# Patient Record
Sex: FEMALE | Race: WHITE | NOT HISPANIC OR LATINO | Employment: FULL TIME | URBAN - METROPOLITAN AREA
[De-identification: names, ages, dates, MRNs, and addresses within clinical notes are randomized per-mention and may not be internally consistent; named-entity substitution may affect disease eponyms.]

---

## 2017-02-03 ENCOUNTER — HOSPITAL ENCOUNTER (OUTPATIENT)
Dept: LAB | Facility: MEDICAL CENTER | Age: 47
End: 2017-02-03
Attending: OBSTETRICS & GYNECOLOGY
Payer: COMMERCIAL

## 2017-02-03 LAB
HBV SURFACE AB SER RIA-ACNC: 11.74 MIU/ML (ref 0–10)
HCV AB S/CO SERPL IA: NEGATIVE
HIV 1+2 AB+HIV1 P24 AG SERPL QL IA: NON REACTIVE
TREPONEMA PALLIDUM IGG+IGM AB [PRESENCE] IN SERUM OR PLASMA BY IMMUNOASSAY: NON REACTIVE

## 2017-02-03 PROCEDURE — 86694 HERPES SIMPLEX NES ANTBDY: CPT

## 2017-02-03 PROCEDURE — 86696 HERPES SIMPLEX TYPE 2 TEST: CPT

## 2017-02-03 PROCEDURE — 86695 HERPES SIMPLEX TYPE 1 TEST: CPT

## 2017-02-03 PROCEDURE — 86803 HEPATITIS C AB TEST: CPT

## 2017-02-03 PROCEDURE — 36415 COLL VENOUS BLD VENIPUNCTURE: CPT

## 2017-02-03 PROCEDURE — 86780 TREPONEMA PALLIDUM: CPT

## 2017-02-03 PROCEDURE — 87389 HIV-1 AG W/HIV-1&-2 AB AG IA: CPT

## 2017-02-03 PROCEDURE — 86706 HEP B SURFACE ANTIBODY: CPT

## 2017-02-10 ENCOUNTER — TELEPHONE (OUTPATIENT)
Dept: MEDICAL GROUP | Facility: PHYSICIAN GROUP | Age: 47
End: 2017-02-10

## 2017-02-10 NOTE — TELEPHONE ENCOUNTER
1. Caller Name: Regina Schmidt                                           Call Back Number: [phone] (home) [phone] (work)        Patient approves a detailed voicemail message: N\A    Pt had labs done 02/03 and states she got part of the results posted to her MyChart but she did not get them all.  She states they are personal and is asking for results.  Thank you

## 2017-02-11 NOTE — TELEPHONE ENCOUNTER
Please call Regina and let her know that the labs are all negative.  The hepatitis B surface Ab is positive because she has been vaccinated.  I will try to release them all in epic to her

## 2017-03-23 LAB — TEST NAME 95000: NORMAL

## 2017-03-24 ENCOUNTER — PATIENT MESSAGE (OUTPATIENT)
Dept: MEDICAL GROUP | Facility: PHYSICIAN GROUP | Age: 47
End: 2017-03-24

## 2017-03-24 DIAGNOSIS — L73.9 FOLLICULITIS: ICD-10-CM

## 2017-03-24 RX ORDER — DOXYCYCLINE HYCLATE 100 MG
100 TABLET ORAL 2 TIMES DAILY
Qty: 20 TAB | Refills: 0 | Status: SHIPPED | OUTPATIENT
Start: 2017-03-24 | End: 2017-10-25

## 2017-04-10 LAB
HERPES SIMPLEX VIRUS 2 IGG AB [PRESENCE] IN SERUM BY IMMUNOBLOT: NEGATIVE
HERPES SIMPLEX VIRUS AG [PRESENCE] IN SERUM BY IMMUNOBLOT: POSITIVE
HSV1 IGG SER QL: POSITIVE

## 2017-05-10 DIAGNOSIS — B37.9 CANDIDA ALBICANS INFECTION: ICD-10-CM

## 2017-05-10 RX ORDER — FLUCONAZOLE 150 MG/1
150 TABLET ORAL ONCE
Qty: 2 TAB | Refills: 0 | Status: SHIPPED | OUTPATIENT
Start: 2017-05-10 | End: 2017-05-10

## 2017-05-15 DIAGNOSIS — B37.9 CANDIDA ALBICANS INFECTION: ICD-10-CM

## 2017-05-15 RX ORDER — FLUCONAZOLE 150 MG/1
150 TABLET ORAL ONCE
Qty: 1 TAB | Refills: 0 | OUTPATIENT
Start: 2017-05-15 | End: 2017-05-15

## 2017-05-15 NOTE — TELEPHONE ENCOUNTER
Please let pt know that Dr Lopez stated that if she is having persistent symptoms she needs to be seen by her. Pt to make appt to discuss further. Medication denied.

## 2017-05-15 NOTE — TELEPHONE ENCOUNTER
Pt received a prescription from urgent care for fluconazole and is requesting a refill  Was the patient seen in the last year in this department? Yes     Does patient have an active prescription for medications requested? No     Received Request Via: Pharmacy      Pt met protocol?: Yes pt last ov 9/2016

## 2017-07-19 ENCOUNTER — APPOINTMENT (OUTPATIENT)
Dept: RADIOLOGY | Facility: MEDICAL CENTER | Age: 47
End: 2017-07-19
Attending: OBSTETRICS & GYNECOLOGY
Payer: COMMERCIAL

## 2017-07-21 DIAGNOSIS — F32.9 REACTIVE DEPRESSION: ICD-10-CM

## 2017-07-21 DIAGNOSIS — M54.2 NECK PAIN, MUSCULOSKELETAL: ICD-10-CM

## 2017-07-21 RX ORDER — ALPRAZOLAM 0.25 MG/1
0.25 TABLET ORAL NIGHTLY PRN
Qty: 30 TAB | Refills: 1 | Status: SHIPPED
Start: 2017-07-21 | End: 2020-09-11 | Stop reason: SDUPTHER

## 2017-07-21 NOTE — TELEPHONE ENCOUNTER
Was the patient seen in the last year in this department? Yes     Does patient have an active prescription for medications requested? No     Received Request Via: Pharmacy [facility]

## 2017-07-21 NOTE — TELEPHONE ENCOUNTER
Was the patient seen in the last year in this department? Yes 09/14/16    Does patient have an active prescription for medications requested? No     Received Request Via: Pharmacy     Last filled 11/30/15

## 2017-07-24 RX ORDER — METAXALONE 800 MG/1
800 TABLET ORAL 3 TIMES DAILY
Qty: 90 TAB | Refills: 3 | Status: SHIPPED | OUTPATIENT
Start: 2017-07-24 | End: 2020-09-11 | Stop reason: SDUPTHER

## 2017-07-29 ENCOUNTER — HOSPITAL ENCOUNTER (OUTPATIENT)
Dept: RADIOLOGY | Facility: MEDICAL CENTER | Age: 47
End: 2017-07-29
Attending: OBSTETRICS & GYNECOLOGY
Payer: COMMERCIAL

## 2017-07-29 DIAGNOSIS — Z12.31 SCREENING MAMMOGRAM, ENCOUNTER FOR: ICD-10-CM

## 2017-07-29 PROCEDURE — 77063 BREAST TOMOSYNTHESIS BI: CPT

## 2017-08-10 ENCOUNTER — TELEPHONE (OUTPATIENT)
Dept: MEDICAL GROUP | Facility: PHYSICIAN GROUP | Age: 47
End: 2017-08-10

## 2017-08-25 ENCOUNTER — PATIENT MESSAGE (OUTPATIENT)
Dept: MEDICAL GROUP | Facility: PHYSICIAN GROUP | Age: 47
End: 2017-08-25

## 2017-08-25 DIAGNOSIS — M81.0 OSTEOPOROSIS, UNSPECIFIED OSTEOPOROSIS TYPE, UNSPECIFIED PATHOLOGICAL FRACTURE PRESENCE: ICD-10-CM

## 2017-08-28 ENCOUNTER — HOSPITAL ENCOUNTER (OUTPATIENT)
Dept: RADIOLOGY | Facility: MEDICAL CENTER | Age: 47
End: 2017-08-28
Attending: FAMILY MEDICINE
Payer: COMMERCIAL

## 2017-08-28 DIAGNOSIS — M81.0 OSTEOPOROSIS, UNSPECIFIED OSTEOPOROSIS TYPE, UNSPECIFIED PATHOLOGICAL FRACTURE PRESENCE: ICD-10-CM

## 2017-08-28 PROCEDURE — 77080 DXA BONE DENSITY AXIAL: CPT

## 2017-09-26 ENCOUNTER — HOSPITAL ENCOUNTER (OUTPATIENT)
Dept: LAB | Facility: MEDICAL CENTER | Age: 47
End: 2017-09-26
Payer: COMMERCIAL

## 2017-09-26 LAB
BDY FAT % MEASURED: 36.2 %
BP DIAS: 60 MMHG
BP SYS: 104 MMHG
CHOLEST SERPL-MCNC: 100 MG/DL (ref 100–199)
DIABETES HTDIA: NO
EVENT NAME HTEVT: NORMAL
FASTING HTFAS: YES
GLUCOSE SERPL-MCNC: 77 MG/DL (ref 65–99)
HDLC SERPL-MCNC: 55 MG/DL
HYPERTENSION HTHYP: NO
LDLC SERPL CALC-MCNC: 31 MG/DL
SCREENING LOC CITY HTCIT: NORMAL
SCREENING LOC STATE HTSTA: NORMAL
SCREENING LOCATION HTLOC: NORMAL
SMOKING HTSMO: NO
SUBSCRIBER ID HTSID: NORMAL
TRIGL SERPL-MCNC: 69 MG/DL (ref 0–149)

## 2017-09-26 PROCEDURE — 80061 LIPID PANEL: CPT

## 2017-09-26 PROCEDURE — 82947 ASSAY GLUCOSE BLOOD QUANT: CPT

## 2017-09-26 PROCEDURE — 36415 COLL VENOUS BLD VENIPUNCTURE: CPT

## 2017-09-26 PROCEDURE — S5190 WELLNESS ASSESSMENT BY NONPH: HCPCS

## 2017-10-06 ENCOUNTER — APPOINTMENT (OUTPATIENT)
Dept: MEDICAL GROUP | Facility: PHYSICIAN GROUP | Age: 47
End: 2017-10-06
Payer: COMMERCIAL

## 2017-10-23 NOTE — TELEPHONE ENCOUNTER
Was the patient seen in the last year in this department? No 09/14/16    Does patient have an active prescription for medications requested? No     Received Request Via: Pharmacy

## 2017-10-25 ENCOUNTER — OFFICE VISIT (OUTPATIENT)
Dept: MEDICAL GROUP | Facility: PHYSICIAN GROUP | Age: 47
End: 2017-10-25
Payer: COMMERCIAL

## 2017-10-25 VITALS
OXYGEN SATURATION: 100 % | HEART RATE: 70 BPM | DIASTOLIC BLOOD PRESSURE: 68 MMHG | BODY MASS INDEX: 31.01 KG/M2 | HEIGHT: 63 IN | TEMPERATURE: 98 F | SYSTOLIC BLOOD PRESSURE: 104 MMHG | RESPIRATION RATE: 12 BRPM | WEIGHT: 175 LBS

## 2017-10-25 DIAGNOSIS — Z98.84 STATUS POST BILIOPANCREATIC DIVERSION WITH DUODENAL SWITCH: ICD-10-CM

## 2017-10-25 DIAGNOSIS — E03.9 HYPOTHYROIDISM, UNSPECIFIED TYPE: ICD-10-CM

## 2017-10-25 DIAGNOSIS — Z23 NEED FOR INFLUENZA VACCINATION: ICD-10-CM

## 2017-10-25 DIAGNOSIS — G43.909 MIGRAINE WITHOUT STATUS MIGRAINOSUS, NOT INTRACTABLE, UNSPECIFIED MIGRAINE TYPE: ICD-10-CM

## 2017-10-25 PROCEDURE — 99214 OFFICE O/P EST MOD 30 MIN: CPT | Mod: 25 | Performed by: FAMILY MEDICINE

## 2017-10-25 PROCEDURE — 90471 IMMUNIZATION ADMIN: CPT | Performed by: FAMILY MEDICINE

## 2017-10-25 PROCEDURE — 90686 IIV4 VACC NO PRSV 0.5 ML IM: CPT | Performed by: FAMILY MEDICINE

## 2017-10-25 RX ORDER — LEVOTHYROXINE SODIUM 88 MCG
88 TABLET ORAL
Qty: 90 TAB | Refills: 3 | Status: SHIPPED | OUTPATIENT
Start: 2017-10-25 | End: 2018-08-31 | Stop reason: SDUPTHER

## 2017-10-25 RX ORDER — FLUTICASONE PROPIONATE 50 MCG
2 SPRAY, SUSPENSION (ML) NASAL DAILY
Qty: 3 BOTTLE | Refills: 3 | Status: SHIPPED | OUTPATIENT
Start: 2017-10-25 | End: 2018-08-31 | Stop reason: SDUPTHER

## 2017-10-25 RX ORDER — LEVOTHYROXINE SODIUM 88 MCG
88 TABLET ORAL
Qty: 90 TAB | Refills: 0 | OUTPATIENT
Start: 2017-10-25

## 2017-10-25 RX ORDER — SUMATRIPTAN 50 MG/1
50 TABLET, FILM COATED ORAL
Qty: 10 TAB | Refills: 11 | Status: SHIPPED | OUTPATIENT
Start: 2017-10-25 | End: 2018-08-31 | Stop reason: SDUPTHER

## 2017-10-25 RX ORDER — TOPIRAMATE 25 MG/1
TABLET ORAL
Qty: 180 TAB | Refills: 3 | Status: SHIPPED | OUTPATIENT
Start: 2017-10-25 | End: 2018-08-31 | Stop reason: SDUPTHER

## 2017-10-25 ASSESSMENT — PATIENT HEALTH QUESTIONNAIRE - PHQ9: CLINICAL INTERPRETATION OF PHQ2 SCORE: 0

## 2017-10-25 NOTE — PROGRESS NOTES
Chief Complaint   Patient presents with   • Medication Refill       HISTORY OF PRESENT ILLNESS: Patient is a 46 y.o. female established patient here today for the following concerns:    1. Need for influenza vaccination  Due for influenza vaccination    2. Migraine without status migrainosus, not intractable, unspecified migraine type  Needs refill on topamax and imitrex 50 mg.  Reports with the topamax has not had many migraines, and when she does they respond to imitrex well.     3. Hypothyroidism, unspecified type  Due for recheck on thyroid.  Continues on 88 mcg.  Symptoms well controlled due for recheck on labs.     4. Status post biliopancreatic diversion with duodenal switch  Due for routine mal-absorptive labs.  Continues on her vitamins.     5. BMI 30.0-30.9,adult  Counseled.       Past Medical, Social, and Family history reviewed and updated in EPIC    Allergies:Review of patient's allergies indicates no active allergies.    Current Outpatient Prescriptions   Medication Sig Dispense Refill   • sumatriptan (IMITREX) 50 MG Tab Take 1 Tab by mouth Once PRN for Migraine for up to 1 dose. 10 Tab 11   • topiramate (TOPAMAX) 25 MG Tab TAKE 1 TABLET BY MOUTH TWICE DAILY FOR     MIGRAINE HEADACHE -GENERIC FOR TOPAMAX 180 Tab 3   • SYNTHROID 88 MCG Tab Take 1 Tab by mouth Every morning on an empty stomach. TAKE 1 TABLET BY MOUTH DAILY 90 Tab 3   • fluticasone (FLONASE) 50 MCG/ACT nasal spray Spray 2 Sprays in nose every day. Each Nostril 3 Bottle 3   • metaxalone (SKELAXIN) 800 MG Tab Take 1 Tab by mouth 3 times a day. 90 Tab 3   • alprazolam (XANAX) 0.25 MG Tab Take 1 Tab by mouth at bedtime as needed for Sleep or Anxiety. Indications: Feeling Anxious, Trouble Sleeping 30 Tab 1   • levonorgestrel (MIRENA) 20 MCG/24HR IUD 1 Each by Intrauterine route Once.     • ondansetron (ZOFRAN ODT) 8 MG TABLET DISPERSIBLE Take 1 Tab by mouth every 8 hours as needed for Nausea/Vomiting. 15 Tab 0   • metoclopramide (REGLAN) 10 MG  "Tab Take 1 Tab by mouth 3 times a day before meals. 60 Tab 0   • Probiotic Product (PROBIOTIC PO) Take 1 Tab by mouth 3 times a day.     • Ferrous Fumarate 325 (106 FE) MG TABS Take  by mouth 3 times a day.     • Zinc 50 MG CAPS Take  by mouth.     • MULTI VIT/FL PO Take  by mouth.     • CALCIUM CITRATE 250 MG PO TABS Take 8 Each by mouth.     • VITAMIN D3 055770 UNIT/GM POWD 50,000 Each by Does not apply route 3 times a day.     • VITAMIN A 25,000 Int'l Units by Does not apply route.     • VITAMIN C 500 MG PO TABS Take 1,000 mg by mouth 2 Times a Day.     • GLUCOSAMINE CHONDRO COMPLEX PO Take  by mouth 2 Times a Day.     • NAPROSYN 250 MG PO TABS Take 250 mg by mouth Once.       No current facility-administered medications for this visit.          ROS:  Review of Systems   Constitutional: Negative for fever, chills, weight loss and malaise/fatigue.   HENT: Negative for ear pain, nosebleeds, congestion, sore throat and neck pain.    Eyes: Negative for blurred vision.   Respiratory: Negative for cough, sputum production, shortness of breath and wheezing.    Cardiovascular: Negative for chest pain, palpitations,  and leg swelling.   Gastrointestinal: Negative for heartburn, nausea, vomiting, diarrhea and abdominal pain.   Genitourinary: Negative for dysuria, urgency and frequency.   Musculoskeletal: Negative for myalgias, back pain and joint pain.   Skin: Negative for rash and itching.   Neurological: Negative for dizziness, tingling, tremors, sensory change, focal weakness and headaches.   Endo/Heme/Allergies: Does not bruise/bleed easily.   Psychiatric/Behavioral: Negative for depression, anxiety, suicidal ideas, insomnia and memory loss.      Exam:  Blood pressure 104/68, pulse 70, temperature 36.7 °C (98 °F), resp. rate 12, height 1.6 m (5' 2.99\"), weight 79.4 kg (175 lb), SpO2 100 %.    General:  Well nourished, well developed in NAD  Head is grossly normal.  Neck: Supple without JVD   Pulmonary:  Normal effort. "   Cardiovascular: Regular rate  Extremities: no clubbing, cyanosis, or edema.  Psych: affect appropriate      Please note that this dictation was created using voice recognition software. I have made every reasonable attempt to correct obvious errors, but I expect that there are errors of grammar and possibly content that I did not discover before finalizing the note.    Assessment/Plan:  1. Need for influenza vaccination    - INFLUENZA VACCINE QUAD INJ >3Y(PF)    2. Migraine without status migrainosus, not intractable, unspecified migraine type    - topiramate (TOPAMAX) 25 MG Tab; TAKE 1 TABLET BY MOUTH TWICE DAILY FOR     MIGRAINE HEADACHE -GENERIC FOR TOPAMAX  Dispense: 180 Tab; Refill: 3    3. Hypothyroidism, unspecified type    - TSH; Future  - FREE THYROXINE; Future  - TRIIDOTHYRONINE; Future    4. Status post biliopancreatic diversion with duodenal switch    - CBC WITH DIFFERENTIAL; Future  - COMP METABOLIC PANEL; Future  - VITAMIN B12; Future  - VITAMIN B6; Future  - IRON/TOTAL IRON BIND; Future  - FERRITIN; Future  - FOLATE; Future  - ZINC SERUM; Future  - COPPER, SERUM; Future  - CERULOPLASMIN; Future  - VITAMIN B3 NIACIN; Future  - PTH INTACT W/CA  - SELENIUM, WHOLE BLOOD; Future  - VITAMIN B1; Future  - VITAMIN A; Future  - TSH; Future  - FREE THYROXINE; Future  - TRIIDOTHYRONINE; Future  - VITAMIN D,25 HYDROXY; Future    5. BMI 30.0-30.9,adult    - Patient identified as having weight management issue.  Appropriate orders and counseling given.

## 2018-02-10 ENCOUNTER — OFFICE VISIT (OUTPATIENT)
Dept: URGENT CARE | Facility: PHYSICIAN GROUP | Age: 48
End: 2018-02-10
Payer: COMMERCIAL

## 2018-02-10 VITALS
DIASTOLIC BLOOD PRESSURE: 70 MMHG | BODY MASS INDEX: 30.48 KG/M2 | HEIGHT: 63 IN | OXYGEN SATURATION: 99 % | RESPIRATION RATE: 12 BRPM | HEART RATE: 83 BPM | SYSTOLIC BLOOD PRESSURE: 108 MMHG | TEMPERATURE: 98.4 F | WEIGHT: 172 LBS

## 2018-02-10 DIAGNOSIS — J01.90 ACUTE BACTERIAL SINUSITIS: ICD-10-CM

## 2018-02-10 DIAGNOSIS — B96.89 ACUTE BACTERIAL SINUSITIS: ICD-10-CM

## 2018-02-10 PROCEDURE — 99214 OFFICE O/P EST MOD 30 MIN: CPT | Performed by: NURSE PRACTITIONER

## 2018-02-10 RX ORDER — AMOXICILLIN AND CLAVULANATE POTASSIUM 875; 125 MG/1; MG/1
1 TABLET, FILM COATED ORAL 2 TIMES DAILY
Qty: 14 TAB | Refills: 0 | Status: SHIPPED
Start: 2018-02-10 | End: 2020-02-19

## 2018-02-10 RX ORDER — FLUCONAZOLE 150 MG/1
150 TABLET ORAL DAILY
Qty: 1 TAB | Refills: 1 | Status: SHIPPED | OUTPATIENT
Start: 2018-02-10 | End: 2019-11-25

## 2018-02-10 ASSESSMENT — ENCOUNTER SYMPTOMS
HEADACHES: 1
MYALGIAS: 1
BACK PAIN: 0
SINUS PAIN: 1
COUGH: 0
CHILLS: 0
NECK PAIN: 1
FEVER: 0
NAUSEA: 1

## 2018-02-10 NOTE — PROGRESS NOTES
Subjective:      Regina Schmidt is a 47 y.o. female who presents with Sinus Problem (ear pain, pressure since monday )            HPI new problem. 47 year old female with sinus congestion and facial pressure for 2 weeks. She states she started with body aches on Monday. Denies fever, chill, cough or sore throat. She has nausea but no vomiting. She has been taking OTC medications and flonase for her symptoms with no relief.  Patient has no known allergies.  Current Outpatient Prescriptions on File Prior to Visit   Medication Sig Dispense Refill   • metaxalone (SKELAXIN) 800 MG Tab Take 1 Tab by mouth 3 times a day. 90 Tab 3   • alprazolam (XANAX) 0.25 MG Tab Take 1 Tab by mouth at bedtime as needed for Sleep or Anxiety. Indications: Feeling Anxious, Trouble Sleeping 30 Tab 1   • levonorgestrel (MIRENA) 20 MCG/24HR IUD 1 Each by Intrauterine route Once.     • ondansetron (ZOFRAN ODT) 8 MG TABLET DISPERSIBLE Take 1 Tab by mouth every 8 hours as needed for Nausea/Vomiting. 15 Tab 0   • metoclopramide (REGLAN) 10 MG Tab Take 1 Tab by mouth 3 times a day before meals. 60 Tab 0   • Probiotic Product (PROBIOTIC PO) Take 1 Tab by mouth 3 times a day.     • Ferrous Fumarate 325 (106 FE) MG TABS Take  by mouth 3 times a day.     • Zinc 50 MG CAPS Take  by mouth.     • MULTI VIT/FL PO Take  by mouth.     • CALCIUM CITRATE 250 MG PO TABS Take 8 Each by mouth.     • VITAMIN D3 041242 UNIT/GM POWD 50,000 Each by Does not apply route 3 times a day.     • VITAMIN A 25,000 Int'l Units by Does not apply route.     • VITAMIN C 500 MG PO TABS Take 1,000 mg by mouth 2 Times a Day.     • GLUCOSAMINE CHONDRO COMPLEX PO Take  by mouth 2 Times a Day.     • NAPROSYN 250 MG PO TABS Take 250 mg by mouth Once.     • sumatriptan (IMITREX) 50 MG Tab Take 1 Tab by mouth Once PRN for Migraine for up to 1 dose. 10 Tab 11   • topiramate (TOPAMAX) 25 MG Tab TAKE 1 TABLET BY MOUTH TWICE DAILY FOR     MIGRAINE HEADACHE -GENERIC FOR TOPAMAX 180  "Tab 3   • SYNTHROID 88 MCG Tab Take 1 Tab by mouth Every morning on an empty stomach. TAKE 1 TABLET BY MOUTH DAILY 90 Tab 3   • fluticasone (FLONASE) 50 MCG/ACT nasal spray Spray 2 Sprays in nose every day. Each Nostril 3 Bottle 3     No current facility-administered medications on file prior to visit.      Social History     Social History   • Marital status: Single     Spouse name: N/A   • Number of children: N/A   • Years of education: N/A     Occupational History   • Not on file.     Social History Main Topics   • Smoking status: Never Smoker   • Smokeless tobacco: Never Used   • Alcohol use 0.0 oz/week   • Drug use: No   • Sexual activity: Yes     Partners: Male     Birth control/ protection: IUD, Surgical      Comment: Mirena  VAS     Other Topics Concern   • Not on file     Social History Narrative   • No narrative on file     family history includes Alcohol/Drug in her father and maternal grandmother; Arthritis in her mother; Cancer in her father, maternal aunt, maternal grandmother, and mother; Hypertension in her father and mother; Stroke in her paternal grandfather.      Review of Systems   Constitutional: Negative for chills and fever.   HENT: Positive for congestion and sinus pain.    Respiratory: Negative for cough.    Gastrointestinal: Positive for nausea.   Musculoskeletal: Positive for myalgias and neck pain. Negative for back pain and joint pain.   Neurological: Positive for headaches.          Objective:     /70   Pulse 83   Temp 36.9 °C (98.4 °F)   Resp 12   Ht 1.6 m (5' 3\")   Wt 78 kg (172 lb)   SpO2 99%   BMI 30.47 kg/m²      Physical Exam   Constitutional: She is oriented to person, place, and time. She appears well-developed and well-nourished. No distress.   HENT:   Head: Normocephalic and atraumatic.   Right Ear: External ear and ear canal normal. Tympanic membrane is not injected and not perforated. No middle ear effusion.   Left Ear: External ear and ear canal normal. " Tympanic membrane is not injected and not perforated.  No middle ear effusion.   Nose: Mucosal edema present.   Mouth/Throat: Posterior oropharyngeal erythema present. No oropharyngeal exudate.   Eyes: Conjunctivae are normal. Right eye exhibits no discharge. Left eye exhibits no discharge.   Neck: Normal range of motion. Neck supple.   Cardiovascular: Normal rate, regular rhythm and normal heart sounds.    No murmur heard.  Pulmonary/Chest: Effort normal and breath sounds normal. No respiratory distress.   Musculoskeletal: Normal range of motion.   Normal movement of all 4 extremities.   Lymphadenopathy:     She has no cervical adenopathy.        Right: No supraclavicular adenopathy present.        Left: No supraclavicular adenopathy present.   Neurological: She is alert and oriented to person, place, and time. Gait normal.   Skin: Skin is warm and dry.   Psychiatric: She has a normal mood and affect. Her behavior is normal. Thought content normal.   Nursing note and vitals reviewed.              Assessment/Plan:     1. Acute bacterial sinusitis  amoxicillin-clavulanate (AUGMENTIN) 875-125 MG Tab    fluconazole (DIFLUCAN) 150 MG tablet     Differential diagnosis, natural history, supportive care, and indications for immediate follow-up discussed at length.

## 2018-03-25 ENCOUNTER — PATIENT MESSAGE (OUTPATIENT)
Dept: MEDICAL GROUP | Facility: PHYSICIAN GROUP | Age: 48
End: 2018-03-25

## 2018-03-25 DIAGNOSIS — R39.9 UTI SYMPTOMS: ICD-10-CM

## 2018-03-26 ENCOUNTER — HOSPITAL ENCOUNTER (OUTPATIENT)
Facility: MEDICAL CENTER | Age: 48
End: 2018-03-26
Attending: INTERNAL MEDICINE
Payer: COMMERCIAL

## 2018-03-26 ENCOUNTER — PATIENT MESSAGE (OUTPATIENT)
Dept: MEDICAL GROUP | Facility: PHYSICIAN GROUP | Age: 48
End: 2018-03-26

## 2018-03-26 DIAGNOSIS — R39.9 UTI SYMPTOMS: ICD-10-CM

## 2018-03-26 LAB
APPEARANCE UR: CLEAR
BACTERIA #/AREA URNS HPF: NEGATIVE /HPF
BILIRUB UR QL STRIP.AUTO: NEGATIVE
COLOR UR: ABNORMAL
CULTURE IF INDICATED INDCX: YES UA CULTURE
EPI CELLS #/AREA URNS HPF: NORMAL /HPF
GLUCOSE UR STRIP.AUTO-MCNC: NEGATIVE MG/DL
HYALINE CASTS #/AREA URNS LPF: NORMAL /LPF
KETONES UR STRIP.AUTO-MCNC: NEGATIVE MG/DL
LEUKOCYTE ESTERASE UR QL STRIP.AUTO: ABNORMAL
MICRO URNS: ABNORMAL
NITRITE UR QL STRIP.AUTO: POSITIVE
PH UR STRIP.AUTO: 6.5 [PH]
PROT UR QL STRIP: NEGATIVE MG/DL
RBC # URNS HPF: NORMAL /HPF
RBC UR QL AUTO: NEGATIVE
SP GR UR STRIP.AUTO: 1.01
UROBILINOGEN UR STRIP.AUTO-MCNC: 1 MG/DL
WBC #/AREA URNS HPF: NORMAL /HPF

## 2018-03-26 PROCEDURE — 87086 URINE CULTURE/COLONY COUNT: CPT

## 2018-03-26 PROCEDURE — 81001 URINALYSIS AUTO W/SCOPE: CPT

## 2018-03-26 RX ORDER — NITROFURANTOIN 25; 75 MG/1; MG/1
100 CAPSULE ORAL 2 TIMES DAILY
Qty: 14 CAP | Refills: 0 | Status: SHIPPED | OUTPATIENT
Start: 2018-03-26 | End: 2020-02-19

## 2018-03-26 NOTE — TELEPHONE ENCOUNTER
I have prescribed her a course of antibiotics but she needs to come to the lab for a urine sample before starting the antibiotics.

## 2018-03-26 NOTE — TELEPHONE ENCOUNTER
----- Message from C[facility] Gutierrez Med [facility]'t sent at 3/26/2018  1:49 PM PDT -----  Did you need the urinalysis culture if indicated to be a clinic collect or a lab collect?  Please advise.  Thank you.

## 2018-03-28 LAB
BACTERIA UR CULT: NORMAL
SIGNIFICANT IND 70042: NORMAL
SITE SITE: NORMAL
SOURCE SOURCE: NORMAL

## 2018-04-04 ENCOUNTER — OFFICE VISIT (OUTPATIENT)
Dept: URGENT CARE | Facility: CLINIC | Age: 48
End: 2018-04-04
Payer: COMMERCIAL

## 2018-04-04 VITALS
DIASTOLIC BLOOD PRESSURE: 78 MMHG | RESPIRATION RATE: 16 BRPM | SYSTOLIC BLOOD PRESSURE: 114 MMHG | BODY MASS INDEX: 32.5 KG/M2 | HEART RATE: 84 BPM | WEIGHT: 183.42 LBS | HEIGHT: 63 IN | TEMPERATURE: 97.6 F | OXYGEN SATURATION: 98 %

## 2018-04-04 DIAGNOSIS — R53.83 FATIGUE, UNSPECIFIED TYPE: ICD-10-CM

## 2018-04-04 DIAGNOSIS — J06.9 UPPER RESPIRATORY TRACT INFECTION, UNSPECIFIED TYPE: ICD-10-CM

## 2018-04-04 DIAGNOSIS — R52 BODY ACHES: ICD-10-CM

## 2018-04-04 DIAGNOSIS — Z87.440 HISTORY OF UTI: ICD-10-CM

## 2018-04-04 LAB
APPEARANCE UR: NORMAL
BILIRUB UR STRIP-MCNC: NORMAL MG/DL
COLOR UR AUTO: YELLOW
FLUAV+FLUBV AG SPEC QL IA: NEGATIVE
GLUCOSE UR STRIP.AUTO-MCNC: NORMAL MG/DL
HETEROPH AB SER QL LA: NEGATIVE
INT CON NEG: NEGATIVE
INT CON NEG: NORMAL
INT CON POS: NORMAL
INT CON POS: POSITIVE
KETONES UR STRIP.AUTO-MCNC: NORMAL MG/DL
LEUKOCYTE ESTERASE UR QL STRIP.AUTO: NORMAL
NITRITE UR QL STRIP.AUTO: NORMAL
PH UR STRIP.AUTO: 6 [PH] (ref 5–8)
PROT UR QL STRIP: NORMAL MG/DL
RBC UR QL AUTO: NORMAL
SP GR UR STRIP.AUTO: 1
UROBILINOGEN UR STRIP-MCNC: NORMAL MG/DL

## 2018-04-04 PROCEDURE — 86308 HETEROPHILE ANTIBODY SCREEN: CPT | Performed by: NURSE PRACTITIONER

## 2018-04-04 PROCEDURE — 99214 OFFICE O/P EST MOD 30 MIN: CPT | Performed by: NURSE PRACTITIONER

## 2018-04-04 PROCEDURE — 81002 URINALYSIS NONAUTO W/O SCOPE: CPT | Performed by: NURSE PRACTITIONER

## 2018-04-04 PROCEDURE — 87804 INFLUENZA ASSAY W/OPTIC: CPT | Performed by: NURSE PRACTITIONER

## 2018-04-04 RX ORDER — AZITHROMYCIN 250 MG/1
TABLET, FILM COATED ORAL
Qty: 6 TAB | Refills: 0 | Status: SHIPPED | OUTPATIENT
Start: 2018-04-04 | End: 2020-02-19

## 2018-04-04 ASSESSMENT — ENCOUNTER SYMPTOMS
MYALGIAS: 1
GASTROINTESTINAL NEGATIVE: 1
CARDIOVASCULAR NEGATIVE: 1
EYES NEGATIVE: 1
HEADACHES: 0
DIZZINESS: 0
SORE THROAT: 1
CHILLS: 1
WHEEZING: 0
COUGH: 1
FEVER: 1
SHORTNESS OF BREATH: 0

## 2018-04-04 NOTE — PATIENT INSTRUCTIONS
"Influenza, Adult  Influenza (“the flu\") is an infection in the lungs, nose, and throat (respiratory tract). It is caused by a virus. The flu causes many common cold symptoms, as well as a high fever and body aches. It can make you feel very sick.  The flu spreads easily from person to person (is contagious). Getting a flu shot (influenza vaccination) every year is the best way to prevent the flu.  Follow these instructions at home:  · Take over-the-counter and prescription medicines only as told by your doctor.  · Use a cool mist humidifier to add moisture (humidity) to the air in your home. This can make it easier to breathe.  · Rest as needed.  · Drink enough fluid to keep your pee (urine) clear or pale yellow.  · Cover your mouth and nose when you cough or sneeze.  · Wash your hands with soap and water often, especially after you cough or sneeze. If you cannot use soap and water, use hand .  · Stay home from work or school as told by your doctor. Unless you are visiting your doctor, try to avoid leaving home until your fever has been gone for 24 hours without the use of medicine.  · Keep all follow-up visits as told by your doctor. This is important.  How is this prevented?  · Getting a yearly (annual) flu shot is the best way to avoid getting the flu. You may get the flu shot in late summer, fall, or winter. Ask your doctor when you should get your flu shot.  · Wash your hands often or use hand  often.  · Avoid contact with people who are sick during cold and flu season.  · Eat healthy foods.  · Drink plenty of fluids.  · Get enough sleep.  · Exercise regularly.  Contact a doctor if:  · You get new symptoms.  · You have:  ¨ Chest pain.  ¨ Watery poop (diarrhea).  ¨ A fever.  · Your cough gets worse.  · You start to have more mucus.  · You feel sick to your stomach (nauseous).  · You throw up (vomit).  Get help right away if:  · You start to be short of breath or have trouble breathing.  · Your " skin or nails turn a bluish color.  · You have very bad pain or stiffness in your neck.  · You get a sudden headache.  · You get sudden pain in your face or ear.  · You cannot stop throwing up.  This information is not intended to replace advice given to you by your health care provider. Make sure you discuss any questions you have with your health care provider.  Document Released: 09/26/2009 Document Revised: 05/25/2017 Document Reviewed: 10/11/2016  Elsevier Interactive Patient Education © 2017 [employer]

## 2018-04-04 NOTE — PROGRESS NOTES
Subjective:      Regina Schmidt is a 47 y.o. female who presents with Cough (x 5 days, nonproductive cough, chest congestion, fever and bodyaches)            HPI   Patient presents complaining of possible flu symptoms. States symptoms started over the weekend where she felt warm and flushed and tired. Saturday patient was very tired and fatigue was out of the normal.  States she also had a mild cough.  Sunday she developed body aches. States on Monday her fever was 103.9.   Patient has been taking Motrin and Tylenol to bring down fevers.  Patient also complains of a scratchy throat no pain   right ear pressure but no pain   negative sinus pressure or congestion and cough remains mild.  Positive sick contacts    Patient is denying nausea vomiting diarrhea or abdominal pain  Patient does state that last week she had a urinary tract infection and finished her last dose of Macrobid on Monday. Patient is denying any UTI symptoms as she states they have all resolved    Medications: Motrin and Tylenol Sudafed Mucinex and essential oils    PMH:  has a past medical history of Acute pansinusitis (11/25/2015); Fatigue (10/13/2014); Impacted cerumen of both ears (10/13/2014); Lymphedema (1/28/2011); Neck pain, musculoskeletal (11/25/2015); Reactive depression (11/12/2014); S/P gastric bypass (2/4/2010); and S/P tonsillectomy and adenoidectomy. She also has no past medical history of Allergy or ASTHMA.  MEDS:   Current Outpatient Prescriptions:   •  azithromycin (ZITHROMAX) 250 MG Tab, Take 2 tabs day 1. Take 1 tab day 2-5 #6, Disp: 6 Tab, Rfl: 0  •  nitrofurantoin monohydr macro (MACROBID) 100 MG Cap, Take 1 Cap by mouth 2 times a day., Disp: 14 Cap, Rfl: 0  •  sumatriptan (IMITREX) 50 MG Tab, Take 1 Tab by mouth Once PRN for Migraine for up to 1 dose., Disp: 10 Tab, Rfl: 11  •  topiramate (TOPAMAX) 25 MG Tab, TAKE 1 TABLET BY MOUTH TWICE DAILY FOR     MIGRAINE HEADACHE -GENERIC FOR TOPAMAX, Disp: 180 Tab, Rfl: 3  •   SYNTHROID 88 MCG Tab, Take 1 Tab by mouth Every morning on an empty stomach. TAKE 1 TABLET BY MOUTH DAILY, Disp: 90 Tab, Rfl: 3  •  fluticasone (FLONASE) 50 MCG/ACT nasal spray, Spray 2 Sprays in nose every day. Each Nostril, Disp: 3 Bottle, Rfl: 3  •  metaxalone (SKELAXIN) 800 MG Tab, Take 1 Tab by mouth 3 times a day., Disp: 90 Tab, Rfl: 3  •  levonorgestrel (MIRENA) 20 MCG/24HR IUD, 1 Each by Intrauterine route Once., Disp: , Rfl:   •  ondansetron (ZOFRAN ODT) 8 MG TABLET DISPERSIBLE, Take 1 Tab by mouth every 8 hours as needed for Nausea/Vomiting., Disp: 15 Tab, Rfl: 0  •  Probiotic Product (PROBIOTIC PO), Take 1 Tab by mouth 3 times a day., Disp: , Rfl:   •  Ferrous Fumarate 325 (106 FE) MG TABS, Take  by mouth 3 times a day., Disp: , Rfl:   •  Zinc 50 MG CAPS, Take  by mouth., Disp: , Rfl:   •  MULTI VIT/FL PO, Take  by mouth., Disp: , Rfl:   •  VITAMIN D3 219179 UNIT/GM POWD, 50,000 Each by Does not apply route 3 times a day., Disp: , Rfl:   •  VITAMIN A, 25,000 Int'l Units by Does not apply route., Disp: , Rfl:   •  VITAMIN C 500 MG PO TABS, Take 1,000 mg by mouth 2 Times a Day., Disp: , Rfl:   •  GLUCOSAMINE CHONDRO COMPLEX PO, Take  by mouth 2 Times a Day., Disp: , Rfl:   •  amoxicillin-clavulanate (AUGMENTIN) 875-125 MG Tab, Take 1 Tab by mouth 2 times a day., Disp: 14 Tab, Rfl: 0  •  fluconazole (DIFLUCAN) 150 MG tablet, Take 1 Tab by mouth every day., Disp: 1 Tab, Rfl: 1  •  alprazolam (XANAX) 0.25 MG Tab, Take 1 Tab by mouth at bedtime as needed for Sleep or Anxiety. Indications: Feeling Anxious, Trouble Sleeping, Disp: 30 Tab, Rfl: 1  •  metoclopramide (REGLAN) 10 MG Tab, Take 1 Tab by mouth 3 times a day before meals., Disp: 60 Tab, Rfl: 0  •  CALCIUM CITRATE 250 MG PO TABS, Take 8 Each by mouth., Disp: , Rfl:   •  NAPROSYN 250 MG PO TABS, Take 250 mg by mouth Once., Disp: , Rfl:   ALLERGIES: No Known Allergies  SURGHX:   Past Surgical History:   Procedure Laterality Date   • ABDOMINOPLASTY       "following weight loss   • CHOLECYSTECTOMY     • GASTRIC RESECTION      duodenal switch   • PRIMARY C SECTION     • TONSILLECTOMY AND ADENOIDECTOMY     • US-CYST ASPIRATION-BREAST INITIAL       SOCHX:  reports that she has never smoked. She has never used smokeless tobacco. She reports that she drinks alcohol. She reports that she does not use drugs.  FH: family history includes Alcohol/Drug in her father and maternal grandmother; Arthritis in her mother; Cancer in her father, maternal aunt, maternal grandmother, and mother; Hypertension in her father and mother; Stroke in her paternal grandfather.    Review of Systems   Constitutional: Positive for chills, fever and malaise/fatigue.   HENT: Positive for congestion and sore throat.    Eyes: Negative.    Respiratory: Positive for cough. Negative for shortness of breath and wheezing.    Cardiovascular: Negative.    Gastrointestinal: Negative.    Genitourinary: Negative.    Musculoskeletal: Positive for myalgias.   Skin: Positive for rash.   Neurological: Negative for dizziness and headaches.          Objective:     /78   Pulse 84   Temp 36.4 °C (97.6 °F)   Resp 16   Ht 1.6 m (5' 3\")   Wt 83.2 kg (183 lb 6.8 oz)   SpO2 98%   BMI 32.49 kg/m²      Physical Exam   Constitutional: She is oriented to person, place, and time. She appears well-developed and well-nourished.   HENT:   Head: Normocephalic and atraumatic.   Right Ear: External ear normal.   Left Ear: External ear normal.   Nose: Rhinorrhea present.   Eyes: Conjunctivae are normal. Pupils are equal, round, and reactive to light.   Neck: Normal range of motion. Neck supple.   Cardiovascular: Normal rate, regular rhythm and normal heart sounds.    Pulmonary/Chest: Effort normal and breath sounds normal. No respiratory distress.   Abdominal: Soft. Bowel sounds are normal.   Musculoskeletal: Normal range of motion.   Lymphadenopathy:     She has cervical adenopathy.   Neurological: She is alert and " oriented to person, place, and time.   Skin: Skin is warm and dry. Capillary refill takes less than 2 seconds.   Psychiatric: Judgment normal. Her mood appears anxious.               Assessment/Plan:       1. Upper respiratory tract infection, unspecified type  azithromycin (ZITHROMAX) 250 MG Tab   2. History of UTI  POCT Urinalysis   3. Fatigue, unspecified type  POCT Mononucleosis (mono)   4. Body aches  POCT Influenza A/B     Influenza negative  Mono negative  Urine dip--> no nitrites as patient had previously.  Long discussion with patient regarding possible flu and fatigue as this is patient's main concern.  Patient declined CBC  Declined Tamiflu  Advised patient to stay home,rest and  stay hydrated.  Continue Tylenol and Motrin when necessary body aches or fever  Over-the-counter cough medicine as needed  Is fatigue continues patient to follow up with her primary  Contingent antibiotic symptoms do not improve in a few days start Z-Ananda  Where symptoms please follow up PRN sooner

## 2018-04-04 NOTE — LETTER
[facility]  [address]  [place] [address]     April 4, 2018    Patient: Regina Schmidt   YOB: 1970   Date of Visit: 4/4/2018       To Whom It May Concern:    Regina Schmidt was seen and treated in our department on 4/4/2018. She is excused from work 4/4/2018-4/6/2018. May return 4/7/2018.    Sincerely,     SEBASTIAN Sullivan.

## 2018-04-24 DIAGNOSIS — R11.0 NAUSEA: ICD-10-CM

## 2018-04-24 NOTE — TELEPHONE ENCOUNTER
Was the patient seen in the last year in this department? Yes 10/25/2017    Does patient have an active prescription for medications requested? No     Received Request Via: Patient

## 2018-04-25 NOTE — TELEPHONE ENCOUNTER
*/REQUESTED DOSE OF 4mg, CURRENT DOSE 8mg*  Was the patient seen in the last year in this department? Yes     Does patient have an active prescription for medications requested? No     Received Request Via: Pharmacy      Pt met protocol?: YES    LAST OV 10/25/2017

## 2018-04-26 RX ORDER — ONDANSETRON 4 MG/1
TABLET, FILM COATED ORAL
Qty: 12 TAB | Refills: 0 | Status: SHIPPED | OUTPATIENT
Start: 2018-04-26 | End: 2018-08-31 | Stop reason: SDUPTHER

## 2018-07-25 ENCOUNTER — HOSPITAL ENCOUNTER (OUTPATIENT)
Dept: LAB | Facility: MEDICAL CENTER | Age: 48
End: 2018-07-25
Payer: COMMERCIAL

## 2018-07-25 ENCOUNTER — HOSPITAL ENCOUNTER (OUTPATIENT)
Dept: LAB | Facility: MEDICAL CENTER | Age: 48
End: 2018-07-25
Attending: FAMILY MEDICINE
Payer: COMMERCIAL

## 2018-07-25 DIAGNOSIS — Z98.84 STATUS POST BILIOPANCREATIC DIVERSION WITH DUODENAL SWITCH: ICD-10-CM

## 2018-07-25 DIAGNOSIS — E03.9 HYPOTHYROIDISM, UNSPECIFIED TYPE: ICD-10-CM

## 2018-07-25 LAB
25(OH)D3 SERPL-MCNC: 78 NG/ML (ref 30–100)
ALBUMIN SERPL BCP-MCNC: 3.9 G/DL (ref 3.2–4.9)
ALBUMIN/GLOB SERPL: 1.7 G/DL
ALP SERPL-CCNC: 50 U/L (ref 30–99)
ALT SERPL-CCNC: 26 U/L (ref 2–50)
ANION GAP SERPL CALC-SCNC: 6 MMOL/L (ref 0–11.9)
AST SERPL-CCNC: 22 U/L (ref 12–45)
BASOPHILS # BLD AUTO: 0.5 % (ref 0–1.8)
BASOPHILS # BLD: 0.03 K/UL (ref 0–0.12)
BDY FAT % MEASURED: 37.3 %
BILIRUB SERPL-MCNC: 1.2 MG/DL (ref 0.1–1.5)
BP DIAS: 68 MMHG
BP SYS: 93 MMHG
BUN SERPL-MCNC: 18 MG/DL (ref 8–22)
CALCIUM SERPL-MCNC: 8.9 MG/DL (ref 8.5–10.5)
CHLORIDE SERPL-SCNC: 110 MMOL/L (ref 96–112)
CHOLEST SERPL-MCNC: 104 MG/DL (ref 100–199)
CO2 SERPL-SCNC: 25 MMOL/L (ref 20–33)
CREAT SERPL-MCNC: 0.8 MG/DL (ref 0.5–1.4)
DIABETES HTDIA: NO
EOSINOPHIL # BLD AUTO: 0.12 K/UL (ref 0–0.51)
EOSINOPHIL NFR BLD: 2 % (ref 0–6.9)
ERYTHROCYTE [DISTWIDTH] IN BLOOD BY AUTOMATED COUNT: 49.6 FL (ref 35.9–50)
EVENT NAME HTEVT: NORMAL
FASTING HTFAS: YES
FERRITIN SERPL-MCNC: 82.3 NG/ML (ref 10–291)
FOLATE SERPL-MCNC: 16.6 NG/ML
GLOBULIN SER CALC-MCNC: 2.3 G/DL (ref 1.9–3.5)
GLUCOSE SERPL-MCNC: 83 MG/DL (ref 65–99)
GLUCOSE SERPL-MCNC: 83 MG/DL (ref 65–99)
HCT VFR BLD AUTO: 43 % (ref 37–47)
HDLC SERPL-MCNC: 51 MG/DL
HGB BLD-MCNC: 14.1 G/DL (ref 12–16)
HYPERTENSION HTHYP: NO
IMM GRANULOCYTES # BLD AUTO: 0.02 K/UL (ref 0–0.11)
IMM GRANULOCYTES NFR BLD AUTO: 0.3 % (ref 0–0.9)
IRON SATN MFR SERPL: 24 % (ref 15–55)
IRON SERPL-MCNC: 71 UG/DL (ref 40–170)
LDLC SERPL CALC-MCNC: 37 MG/DL
LYMPHOCYTES # BLD AUTO: 1.13 K/UL (ref 1–4.8)
LYMPHOCYTES NFR BLD: 18.5 % (ref 22–41)
MCH RBC QN AUTO: 33.2 PG (ref 27–33)
MCHC RBC AUTO-ENTMCNC: 32.8 G/DL (ref 33.6–35)
MCV RBC AUTO: 101.2 FL (ref 81.4–97.8)
MONOCYTES # BLD AUTO: 0.47 K/UL (ref 0–0.85)
MONOCYTES NFR BLD AUTO: 7.7 % (ref 0–13.4)
NEUTROPHILS # BLD AUTO: 4.35 K/UL (ref 2–7.15)
NEUTROPHILS NFR BLD: 71 % (ref 44–72)
NRBC # BLD AUTO: 0 K/UL
NRBC BLD-RTO: 0 /100 WBC
PLATELET # BLD AUTO: 147 K/UL (ref 164–446)
PMV BLD AUTO: 11.1 FL (ref 9–12.9)
POTASSIUM SERPL-SCNC: 4 MMOL/L (ref 3.6–5.5)
PROT SERPL-MCNC: 6.2 G/DL (ref 6–8.2)
PTH-INTACT SERPL-MCNC: 65.4 PG/ML (ref 14–72)
RBC # BLD AUTO: 4.25 M/UL (ref 4.2–5.4)
SCREENING LOC CITY HTCIT: NORMAL
SCREENING LOC STATE HTSTA: NORMAL
SCREENING LOCATION HTLOC: NORMAL
SMOKING HTSMO: NO
SODIUM SERPL-SCNC: 141 MMOL/L (ref 135–145)
SUBSCRIBER ID HTSID: NORMAL
T3 SERPL-MCNC: 81.6 NG/DL (ref 60–181)
T4 FREE SERPL-MCNC: 0.82 NG/DL (ref 0.53–1.43)
TIBC SERPL-MCNC: 300 UG/DL (ref 250–450)
TRIGL SERPL-MCNC: 82 MG/DL (ref 0–149)
TSH SERPL DL<=0.005 MIU/L-ACNC: 4.01 UIU/ML (ref 0.38–5.33)
VIT B12 SERPL-MCNC: 1100 PG/ML (ref 211–911)
WBC # BLD AUTO: 6.1 K/UL (ref 4.8–10.8)

## 2018-07-25 PROCEDURE — 84443 ASSAY THYROID STIM HORMONE: CPT

## 2018-07-25 PROCEDURE — 82947 ASSAY GLUCOSE BLOOD QUANT: CPT

## 2018-07-25 PROCEDURE — 36415 COLL VENOUS BLD VENIPUNCTURE: CPT

## 2018-07-25 PROCEDURE — 84630 ASSAY OF ZINC: CPT

## 2018-07-25 PROCEDURE — 84590 ASSAY OF VITAMIN A: CPT

## 2018-07-25 PROCEDURE — 82306 VITAMIN D 25 HYDROXY: CPT

## 2018-07-25 PROCEDURE — 84207 ASSAY OF VITAMIN B-6: CPT

## 2018-07-25 PROCEDURE — 84425 ASSAY OF VITAMIN B-1: CPT

## 2018-07-25 PROCEDURE — 83970 ASSAY OF PARATHORMONE: CPT

## 2018-07-25 PROCEDURE — S5190 WELLNESS ASSESSMENT BY NONPH: HCPCS

## 2018-07-25 PROCEDURE — 82390 ASSAY OF CERULOPLASMIN: CPT

## 2018-07-25 PROCEDURE — 83540 ASSAY OF IRON: CPT

## 2018-07-25 PROCEDURE — 82728 ASSAY OF FERRITIN: CPT

## 2018-07-25 PROCEDURE — 80061 LIPID PANEL: CPT

## 2018-07-25 PROCEDURE — 84255 ASSAY OF SELENIUM: CPT

## 2018-07-25 PROCEDURE — 84591 ASSAY OF NOS VITAMIN: CPT

## 2018-07-25 PROCEDURE — 82525 ASSAY OF COPPER: CPT

## 2018-07-25 PROCEDURE — 85025 COMPLETE CBC W/AUTO DIFF WBC: CPT

## 2018-07-25 PROCEDURE — 84480 ASSAY TRIIODOTHYRONINE (T3): CPT

## 2018-07-25 PROCEDURE — 84439 ASSAY OF FREE THYROXINE: CPT

## 2018-07-25 PROCEDURE — 80053 COMPREHEN METABOLIC PANEL: CPT

## 2018-07-25 PROCEDURE — 82607 VITAMIN B-12: CPT

## 2018-07-25 PROCEDURE — 83550 IRON BINDING TEST: CPT

## 2018-07-25 PROCEDURE — 82746 ASSAY OF FOLIC ACID SERUM: CPT

## 2018-07-26 LAB — CERULOPLASMIN SERPL-MCNC: 19 MG/DL (ref 17–54)

## 2018-07-27 LAB — COPPER SERPL-MCNC: 86 UG/DL (ref 80–155)

## 2018-07-28 LAB
SELENIUM SERPL-MCNC: 102 UG/L (ref 23–190)
ZINC BLD-MCNC: 540.3 UG/DL (ref 440–860)

## 2018-07-29 LAB
ANNOTATION COMMENT IMP: NORMAL
RETINYL PALMITATE SERPL-MCNC: 0.02 MG/L (ref 0–0.1)
VIT A SERPL-MCNC: 0.55 MG/L (ref 0.3–1.2)
VIT B6 SERPL-MCNC: 68.8 NMOL/L (ref 20–125)

## 2018-07-30 LAB — VIT B1 BLD-MCNC: 166 NMOL/L (ref 70–180)

## 2018-07-31 ENCOUNTER — APPOINTMENT (OUTPATIENT)
Dept: RADIOLOGY | Facility: MEDICAL CENTER | Age: 48
End: 2018-07-31
Attending: OBSTETRICS & GYNECOLOGY
Payer: COMMERCIAL

## 2018-07-31 ENCOUNTER — PATIENT MESSAGE (OUTPATIENT)
Dept: MEDICAL GROUP | Facility: PHYSICIAN GROUP | Age: 48
End: 2018-07-31

## 2018-08-03 LAB — NIACIN SERPL-MCNC: 4.75 UG/ML (ref 0.5–8.45)

## 2018-08-08 ENCOUNTER — HOSPITAL ENCOUNTER (OUTPATIENT)
Dept: RADIOLOGY | Facility: MEDICAL CENTER | Age: 48
End: 2018-08-08
Attending: OBSTETRICS & GYNECOLOGY
Payer: COMMERCIAL

## 2018-08-08 DIAGNOSIS — Z12.31 VISIT FOR SCREENING MAMMOGRAM: ICD-10-CM

## 2018-08-08 PROCEDURE — 77067 SCR MAMMO BI INCL CAD: CPT

## 2018-08-31 ENCOUNTER — OFFICE VISIT (OUTPATIENT)
Dept: MEDICAL GROUP | Facility: PHYSICIAN GROUP | Age: 48
End: 2018-08-31
Payer: COMMERCIAL

## 2018-08-31 VITALS
BODY MASS INDEX: 32.78 KG/M2 | SYSTOLIC BLOOD PRESSURE: 106 MMHG | WEIGHT: 185 LBS | RESPIRATION RATE: 14 BRPM | OXYGEN SATURATION: 98 % | HEIGHT: 63 IN | TEMPERATURE: 98 F | DIASTOLIC BLOOD PRESSURE: 62 MMHG | HEART RATE: 66 BPM

## 2018-08-31 DIAGNOSIS — M54.2 CERVICALGIA: ICD-10-CM

## 2018-08-31 DIAGNOSIS — M25.552 LEFT HIP PAIN: ICD-10-CM

## 2018-08-31 DIAGNOSIS — G43.909 MIGRAINE WITHOUT STATUS MIGRAINOSUS, NOT INTRACTABLE, UNSPECIFIED MIGRAINE TYPE: ICD-10-CM

## 2018-08-31 DIAGNOSIS — R11.0 NAUSEA: ICD-10-CM

## 2018-08-31 DIAGNOSIS — M72.2 PLANTAR FASCIITIS OF LEFT FOOT: ICD-10-CM

## 2018-08-31 PROCEDURE — 99214 OFFICE O/P EST MOD 30 MIN: CPT | Performed by: FAMILY MEDICINE

## 2018-08-31 RX ORDER — SUMATRIPTAN 50 MG/1
50 TABLET, FILM COATED ORAL
Qty: 10 TAB | Refills: 11 | Status: SHIPPED | OUTPATIENT
Start: 2018-08-31 | End: 2019-08-28

## 2018-08-31 RX ORDER — FLUTICASONE PROPIONATE 50 MCG
2 SPRAY, SUSPENSION (ML) NASAL DAILY
Qty: 3 BOTTLE | Refills: 3 | Status: SHIPPED | OUTPATIENT
Start: 2018-08-31 | End: 2022-12-05 | Stop reason: SDUPTHER

## 2018-08-31 RX ORDER — LEVOTHYROXINE SODIUM 88 MCG
88 TABLET ORAL
Qty: 90 TAB | Refills: 3 | Status: SHIPPED | OUTPATIENT
Start: 2018-08-31 | End: 2019-08-28 | Stop reason: SDUPTHER

## 2018-08-31 RX ORDER — ONDANSETRON 4 MG/1
TABLET, FILM COATED ORAL
Qty: 12 TAB | Refills: 11 | Status: SHIPPED | OUTPATIENT
Start: 2018-08-31 | End: 2019-08-28 | Stop reason: SDUPTHER

## 2018-08-31 RX ORDER — TOPIRAMATE 25 MG/1
TABLET ORAL
Qty: 180 TAB | Refills: 3 | Status: SHIPPED | OUTPATIENT
Start: 2018-08-31 | End: 2019-08-28 | Stop reason: SDUPTHER

## 2018-08-31 NOTE — PROGRESS NOTES
Chief Complaint   Patient presents with   • Hypothyroidism     fv meds   • Migraine     fv meds       HISTORY OF PRESENT ILLNESS: Patient is a 47 y.o. female established patient here today for the following concerns:    1. Left hip pain  Patient is here today for follow up today.  She needs meds refilled.  Reports that she has been having left hip pain for years, but worsening with sticking, popping, worse with flexion or abduction and crossing the legs.  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      2. Plantar fasciitis of left foot  In addition has been dealing with left heel pain which has been worsening despite conservative measures and change in foot wear.  She has tried icing, heel cups, rolling the feet and the first step of the morning remains painful.  Would like to have injection done to try to hasten her recovery.     3. Migraine without status migrainosus, not intractable, unspecified migraine type  Lastly, needs refill on migraine medication.  Migraines have been stable.  No increase in frequency, duration or characteristics.  Continues on the topamax, imitrex rescue and zofran for associated nausea.           Past Medical, Social, and Family history reviewed and updated in EPIC    Allergies:Patient has no known allergies.    Current Outpatient Prescriptions   Medication Sig Dispense Refill   • topiramate (TOPAMAX) 25 MG Tab TAKE 1 TABLET BY MOUTH TWICE DAILY FOR     MIGRAINE HEADACHE -GENERIC FOR TOPAMAX 180 Tab 3   • SYNTHROID 88 MCG Tab Take 1 Tab by mouth Every morning on an empty stomach. TAKE 1 TABLET BY MOUTH DAILY 90 Tab 3   • SUMAtriptan (IMITREX) 50 MG Tab Take 1 Tab by mouth Once PRN for Migraine for up to 1 dose. 10 Tab 11   • ondansetron (ZOFRAN) 4 MG Tab tablet TAKE 1 TABLET BY MOUTH EVERY 8 HOURS IFNEEDED FOR NAUSEA AND VOMITING **GENERICFOR ZOFRAN 12 Tab 11   • fluticasone (FLONASE) 50 MCG/ACT nasal spray Spray 2 Sprays in nose every day. Each Nostril 3  Bottle 3   • metaxalone (SKELAXIN) 800 MG Tab Take 1 Tab by mouth 3 times a day. 90 Tab 3   • alprazolam (XANAX) 0.25 MG Tab Take 1 Tab by mouth at bedtime as needed for Sleep or Anxiety. Indications: Feeling Anxious, Trouble Sleeping 30 Tab 1   • levonorgestrel (MIRENA) 20 MCG/24HR IUD 1 Each by Intrauterine route Once.     • ondansetron (ZOFRAN ODT) 8 MG TABLET DISPERSIBLE Take 1 Tab by mouth every 8 hours as needed for Nausea/Vomiting. 15 Tab 0   • metoclopramide (REGLAN) 10 MG Tab Take 1 Tab by mouth 3 times a day before meals. 60 Tab 0   • Probiotic Product (PROBIOTIC PO) Take 1 Tab by mouth 3 times a day.     • Ferrous Fumarate 325 (106 FE) MG TABS Take  by mouth 3 times a day.     • MULTI VIT/FL PO Take  by mouth.     • CALCIUM CITRATE 250 MG PO TABS Take 8 Each by mouth.     • VITAMIN D3 360456 UNIT/GM POWD 50,000 Each by Does not apply route 3 times a day.     • VITAMIN A 25,000 Int'l Units by Does not apply route.     • VITAMIN C 500 MG PO TABS Take 1,000 mg by mouth 2 Times a Day.     • GLUCOSAMINE CHONDRO COMPLEX PO Take  by mouth 2 Times a Day.     • NAPROSYN 250 MG PO TABS Take 250 mg by mouth Once.     • azithromycin (ZITHROMAX) 250 MG Tab Take 2 tabs day 1. Take 1 tab day 2-5 #6 6 Tab 0   • nitrofurantoin monohydr macro (MACROBID) 100 MG Cap Take 1 Cap by mouth 2 times a day. 14 Cap 0   • amoxicillin-clavulanate (AUGMENTIN) 875-125 MG Tab Take 1 Tab by mouth 2 times a day. 14 Tab 0   • fluconazole (DIFLUCAN) 150 MG tablet Take 1 Tab by mouth every day. 1 Tab 1   • Zinc 50 MG CAPS Take  by mouth.       No current facility-administered medications for this visit.          ROS:  Review of Systems   Constitutional: Negative for fever, chills, weight loss and malaise/fatigue.   HENT: Negative for ear pain, nosebleeds, congestion, sore throat and neck pain.    Eyes: Negative for blurred vision.   Respiratory: Negative for cough, sputum production, shortness of breath and wheezing.    Cardiovascular:  "Negative for chest pain, palpitations,  and leg swelling.   Gastrointestinal: Negative for heartburn, nausea, vomiting, diarrhea and abdominal pain.   Genitourinary: Negative for dysuria, urgency and frequency.   Musculoskeletal: Negative for myalgias, back pain and joint pain.   Skin: Negative for rash and itching.   Neurological: Negative for dizziness, tingling, tremors, sensory change, focal weakness and headaches.   Endo/Heme/Allergies: Does not bruise/bleed easily.   Psychiatric/Behavioral: Negative for depression, anxiety, suicidal ideas, insomnia and memory loss.      Exam:  Blood pressure 106/62, pulse 66, temperature 36.7 °C (98 °F), resp. rate 14, height 1.6 m (5' 3\"), weight 83.9 kg (185 lb), SpO2 98 %.    General:  Well nourished, well developed in NAD  Head is grossly normal.  Neck: Supple without JVD   Pulmonary:  Normal effort.   Cardiovascular: Regular rate  Extremities: no clubbing, cyanosis, or edema.  Psych: affect appropriate  MSK: pain in the anterior left hip with flexion and internal rotation as well as external rotation of the left hip.     Please note that this dictation was created using voice recognition software. I have made every reasonable attempt to correct obvious errors, but I expect that there are errors of grammar and possibly content that I did not discover before finalizing the note.    Assessment/Plan:  1. Left hip pain  Suspect early OA vs impingement.    - DX-HIP-BILATERAL-WITH PELVIS-3/4 VIEWS; Future    2. Plantar fasciitis of left foot    - REFERRAL TO INTERVENTIONAL RADIOLOGY    3. Migraine without status migrainosus, not intractable, unspecified migraine type  - topiramate (TOPAMAX) 25 MG Tab; TAKE 1 TABLET BY MOUTH TWICE DAILY FOR     MIGRAINE HEADACHE -GENERIC FOR TOPAMAX  Dispense: 180 Tab; Refill: 3    4. Nausea  - ondansetron (ZOFRAN) 4 MG Tab tablet; TAKE 1 TABLET BY MOUTH EVERY 8 HOURS IFNEEDED FOR NAUSEA AND VOMITING **GENERICFOR ZOFRAN  Dispense: 12 Tab; Refill: " 11

## 2018-09-05 ENCOUNTER — HOSPITAL ENCOUNTER (OUTPATIENT)
Dept: RADIOLOGY | Facility: MEDICAL CENTER | Age: 48
End: 2018-09-05
Attending: FAMILY MEDICINE
Payer: COMMERCIAL

## 2018-09-05 ENCOUNTER — PATIENT MESSAGE (OUTPATIENT)
Dept: MEDICAL GROUP | Facility: PHYSICIAN GROUP | Age: 48
End: 2018-09-05

## 2018-09-05 DIAGNOSIS — M25.552 LEFT HIP PAIN: ICD-10-CM

## 2018-09-05 DIAGNOSIS — M25.552 PAIN OF LEFT HIP JOINT: ICD-10-CM

## 2018-09-05 PROCEDURE — 73521 X-RAY EXAM HIPS BI 2 VIEWS: CPT

## 2018-09-05 NOTE — TELEPHONE ENCOUNTER
----- Message from Regina Schmidt sent at 9/5/2018  4:20 PM PDT -----  Regarding: Test Result Question  Contact: [phone]  Dr Lopez    If you see my hip X-ray results before you go home would you please let me know?    Thank you,    Regina     [phone]

## 2018-10-01 ENCOUNTER — APPOINTMENT (OUTPATIENT)
Dept: PHYSICAL THERAPY | Facility: REHABILITATION | Age: 48
End: 2018-10-01
Attending: FAMILY MEDICINE
Payer: COMMERCIAL

## 2018-10-02 ENCOUNTER — IMMUNIZATION (OUTPATIENT)
Dept: OCCUPATIONAL MEDICINE | Facility: CLINIC | Age: 48
End: 2018-10-02

## 2018-10-02 DIAGNOSIS — Z23 NEED FOR VACCINATION: ICD-10-CM

## 2018-10-04 ENCOUNTER — APPOINTMENT (OUTPATIENT)
Dept: PHYSICAL THERAPY | Facility: REHABILITATION | Age: 48
End: 2018-10-04
Attending: FAMILY MEDICINE
Payer: COMMERCIAL

## 2018-10-08 ENCOUNTER — APPOINTMENT (OUTPATIENT)
Dept: PHYSICAL THERAPY | Facility: REHABILITATION | Age: 48
End: 2018-10-08
Attending: FAMILY MEDICINE
Payer: COMMERCIAL

## 2018-10-09 PROCEDURE — 90686 IIV4 VACC NO PRSV 0.5 ML IM: CPT | Performed by: PREVENTIVE MEDICINE

## 2018-10-11 ENCOUNTER — APPOINTMENT (OUTPATIENT)
Dept: PHYSICAL THERAPY | Facility: REHABILITATION | Age: 48
End: 2018-10-11
Attending: FAMILY MEDICINE
Payer: COMMERCIAL

## 2018-10-15 ENCOUNTER — APPOINTMENT (OUTPATIENT)
Dept: PHYSICAL THERAPY | Facility: REHABILITATION | Age: 48
End: 2018-10-15
Attending: FAMILY MEDICINE
Payer: COMMERCIAL

## 2018-10-22 ENCOUNTER — APPOINTMENT (OUTPATIENT)
Dept: PHYSICAL THERAPY | Facility: REHABILITATION | Age: 48
End: 2018-10-22
Attending: FAMILY MEDICINE
Payer: COMMERCIAL

## 2018-10-25 ENCOUNTER — APPOINTMENT (OUTPATIENT)
Dept: PHYSICAL THERAPY | Facility: REHABILITATION | Age: 48
End: 2018-10-25
Attending: FAMILY MEDICINE
Payer: COMMERCIAL

## 2018-12-13 DIAGNOSIS — L73.9 FOLLICULITIS: ICD-10-CM

## 2018-12-13 RX ORDER — DOXYCYCLINE HYCLATE 100 MG
100 TABLET ORAL 2 TIMES DAILY
Qty: 20 TAB | Refills: 0 | Status: SHIPPED | OUTPATIENT
Start: 2018-12-13 | End: 2020-02-19

## 2019-01-09 ENCOUNTER — OFFICE VISIT (OUTPATIENT)
Dept: MEDICAL GROUP | Facility: PHYSICIAN GROUP | Age: 49
End: 2019-01-09
Payer: COMMERCIAL

## 2019-01-09 VITALS
TEMPERATURE: 97.5 F | HEIGHT: 63 IN | SYSTOLIC BLOOD PRESSURE: 114 MMHG | DIASTOLIC BLOOD PRESSURE: 68 MMHG | HEART RATE: 66 BPM | WEIGHT: 185 LBS | RESPIRATION RATE: 16 BRPM | OXYGEN SATURATION: 99 % | BODY MASS INDEX: 32.78 KG/M2

## 2019-01-09 DIAGNOSIS — L73.1 INGROWING HAIR: ICD-10-CM

## 2019-01-09 PROCEDURE — 11901 INJECT SKIN LESIONS >7: CPT | Performed by: FAMILY MEDICINE

## 2019-01-09 PROCEDURE — 99213 OFFICE O/P EST LOW 20 MIN: CPT | Mod: 25 | Performed by: FAMILY MEDICINE

## 2019-01-09 NOTE — PROGRESS NOTES
"Chief Complaint   Patient presents with   • Skin Lesion     rt side of thigh       HISTORY OF PRESENT ILLNESS: Patient is a 48 y.o. female established patient here today for the following concerns:    1. Ingrowing hair  Reports skin lesion for the last several weeks in the groin area.  Reports that she has tried to \"squeeze it\" and perform a small I&D with only serosanginous fluid returned.  Denies any redness.  Started on doxy for possible folliculitis without improvement.  Now here for evaluation.       Past Medical, Social, and Family history reviewed and updated in EPIC    Allergies:Patient has no known allergies.    Current Outpatient Prescriptions   Medication Sig Dispense Refill   • topiramate (TOPAMAX) 25 MG Tab TAKE 1 TABLET BY MOUTH TWICE DAILY FOR     MIGRAINE HEADACHE -GENERIC FOR TOPAMAX 180 Tab 3   • SYNTHROID 88 MCG Tab Take 1 Tab by mouth Every morning on an empty stomach. TAKE 1 TABLET BY MOUTH DAILY 90 Tab 3   • fluticasone (FLONASE) 50 MCG/ACT nasal spray Spray 2 Sprays in nose every day. Each Nostril 3 Bottle 3   • levonorgestrel (MIRENA) 20 MCG/24HR IUD 1 Each by Intrauterine route Once.     • Probiotic Product (PROBIOTIC PO) Take 1 Tab by mouth 3 times a day.     • Ferrous Fumarate 325 (106 FE) MG TABS Take  by mouth 3 times a day.     • Zinc 50 MG CAPS Take  by mouth.     • MULTI VIT/FL PO Take  by mouth.     • CALCIUM CITRATE 250 MG PO TABS Take 8 Each by mouth.     • VITAMIN D3 630052 UNIT/GM POWD 50,000 Each by Does not apply route 3 times a day.     • VITAMIN A 25,000 Int'l Units by Does not apply route.     • VITAMIN C 500 MG PO TABS Take 1,000 mg by mouth 2 Times a Day.     • GLUCOSAMINE CHONDRO COMPLEX PO Take  by mouth 2 Times a Day.     • NAPROSYN 250 MG PO TABS Take 250 mg by mouth Once.     • doxycycline (VIBRAMYCIN) 100 MG Tab Take 1 Tab by mouth 2 times a day. (Patient not taking: Reported on 1/9/2019) 20 Tab 0   • SUMAtriptan (IMITREX) 50 MG Tab Take 1 Tab by mouth Once PRN for " Migraine for up to 1 dose. 10 Tab 11   • ondansetron (ZOFRAN) 4 MG Tab tablet TAKE 1 TABLET BY MOUTH EVERY 8 HOURS IFNEEDED FOR NAUSEA AND VOMITING **GENERICFOR ZOFRAN 12 Tab 11   • azithromycin (ZITHROMAX) 250 MG Tab Take 2 tabs day 1. Take 1 tab day 2-5 #6 (Patient not taking: Reported on 1/9/2019) 6 Tab 0   • nitrofurantoin monohydr macro (MACROBID) 100 MG Cap Take 1 Cap by mouth 2 times a day. (Patient not taking: Reported on 1/9/2019) 14 Cap 0   • amoxicillin-clavulanate (AUGMENTIN) 875-125 MG Tab Take 1 Tab by mouth 2 times a day. (Patient not taking: Reported on 1/9/2019) 14 Tab 0   • fluconazole (DIFLUCAN) 150 MG tablet Take 1 Tab by mouth every day. (Patient not taking: Reported on 1/9/2019) 1 Tab 1   • metaxalone (SKELAXIN) 800 MG Tab Take 1 Tab by mouth 3 times a day. 90 Tab 3   • alprazolam (XANAX) 0.25 MG Tab Take 1 Tab by mouth at bedtime as needed for Sleep or Anxiety. Indications: Feeling Anxious, Trouble Sleeping 30 Tab 1   • ondansetron (ZOFRAN ODT) 8 MG TABLET DISPERSIBLE Take 1 Tab by mouth every 8 hours as needed for Nausea/Vomiting. 15 Tab 0   • metoclopramide (REGLAN) 10 MG Tab Take 1 Tab by mouth 3 times a day before meals. 60 Tab 0     No current facility-administered medications for this visit.          ROS:  Review of Systems   Constitutional: Negative for fever, chills, weight loss and malaise/fatigue.   HENT: Negative for ear pain, nosebleeds, congestion, sore throat and neck pain.    Eyes: Negative for blurred vision.   Respiratory: Negative for cough, sputum production, shortness of breath and wheezing.    Cardiovascular: Negative for chest pain, palpitations,  and leg swelling.   Gastrointestinal: Negative for heartburn, nausea, vomiting, diarrhea and abdominal pain.   Genitourinary: Negative for dysuria, urgency and frequency.   Musculoskeletal: Negative for myalgias, back pain and joint pain.   Skin: Negative for rash and itching.   Neurological: Negative for dizziness, tingling,  "tremors, sensory change, focal weakness and headaches.   Endo/Heme/Allergies: Does not bruise/bleed easily.   Psychiatric/Behavioral: Negative for depression, anxiety, suicidal ideas, insomnia and memory loss.      Exam:  Blood pressure 114/68, pulse 66, temperature 36.4 °C (97.5 °F), resp. rate 16, height 1.6 m (5' 3\"), weight 83.9 kg (185 lb), SpO2 99 %.    General:  Well nourished, well developed in NAD  Head is grossly normal.  Neck: Supple without JVD   Pulmonary:  Normal effort.   Cardiovascular: Regular rate  Extremities: no clubbing, cyanosis, or edema.  Psych: affect appropriate  Skin: right groin area with small flesh colored papule without erythema or induration, small amount of serous fluid is returned with pressure.     Please note that this dictation was created using voice recognition software. I have made every reasonable attempt to correct obvious errors, but I expect that there are errors of grammar and possibly content that I did not discover before finalizing the note.    Assessment/Plan:  1. Ingrowing hair  Suspect ingrown hair.  With patient's permission the area is cleaned with betadine and 12.5 mg of kenalog is injected into the lesion.  Tolerated will.  No indication for antibiotics.  Advise to not \"squeeze\" or pick at the lesion.  Likely to take several months to improve.  Monitor for infection.             "

## 2019-01-16 DIAGNOSIS — L98.9 SKIN LESION: ICD-10-CM

## 2019-01-16 DIAGNOSIS — R23.8 INFLAMMATORY PAPULE: ICD-10-CM

## 2019-01-16 RX ORDER — SULFACETAMIDE SODIUM 100 MG/G
OINTMENT OPHTHALMIC
Qty: 1 TUBE | Refills: 0 | Status: ON HOLD | OUTPATIENT
Start: 2019-01-16 | End: 2022-07-12

## 2019-01-23 ENCOUNTER — TELEPHONE (OUTPATIENT)
Dept: MEDICAL GROUP | Facility: PHYSICIAN GROUP | Age: 49
End: 2019-01-23

## 2019-01-23 NOTE — TELEPHONE ENCOUNTER
[id number] - [place] - [address]  [address]  [place] [address]  Phone: [phone] Fax: [phone]      Pharmacy received rx for sulfacetamide ointment and states pr thought she was going to get a steroid ointment.  Pharmacy is asking for verification.

## 2019-01-23 NOTE — TELEPHONE ENCOUNTER
No the steroid shot did not work, so we are going to try a different ointment until she gets in with derm for folliculitis

## 2019-02-26 ENCOUNTER — HOSPITAL ENCOUNTER (OUTPATIENT)
Dept: LAB | Facility: MEDICAL CENTER | Age: 49
End: 2019-02-26
Attending: FAMILY MEDICINE
Payer: COMMERCIAL

## 2019-02-26 ENCOUNTER — TELEPHONE (OUTPATIENT)
Dept: MEDICAL GROUP | Facility: PHYSICIAN GROUP | Age: 49
End: 2019-02-26

## 2019-02-26 DIAGNOSIS — N39.0 URINARY TRACT INFECTION WITHOUT HEMATURIA, SITE UNSPECIFIED: ICD-10-CM

## 2019-02-26 LAB
APPEARANCE UR: CLEAR
BACTERIA #/AREA URNS HPF: ABNORMAL /HPF
BILIRUB UR QL STRIP.AUTO: NEGATIVE
COLOR UR: ABNORMAL
EPI CELLS #/AREA URNS HPF: NEGATIVE /HPF
GLUCOSE UR STRIP.AUTO-MCNC: NEGATIVE MG/DL
HYALINE CASTS #/AREA URNS LPF: ABNORMAL /LPF
KETONES UR STRIP.AUTO-MCNC: NEGATIVE MG/DL
LEUKOCYTE ESTERASE UR QL STRIP.AUTO: ABNORMAL
MICRO URNS: ABNORMAL
NITRITE UR QL STRIP.AUTO: POSITIVE
PH UR STRIP.AUTO: 6 [PH]
PROT UR QL STRIP: NEGATIVE MG/DL
RBC # URNS HPF: ABNORMAL /HPF
RBC UR QL AUTO: NEGATIVE
SP GR UR STRIP.AUTO: 1.01
UROBILINOGEN UR STRIP.AUTO-MCNC: 1 MG/DL
WBC #/AREA URNS HPF: ABNORMAL /HPF

## 2019-02-26 PROCEDURE — 81001 URINALYSIS AUTO W/SCOPE: CPT

## 2019-02-26 NOTE — TELEPHONE ENCOUNTER
----- Message from Regina Schmidt sent at 2/26/2019  7:44 AM PST -----  Regarding: RE: RE: Non-Urgent Medical Question  Contact: [phone]  If it was meant for me I am unable to come in at 10 AM I work at [email or web address] from 8 to 5 every day so 10 AM is kind of in the middle of our clinic flow if she needs a urinalysis to prove it I can always drop off the specimen to the main lab if she orders a UA but if you tell me exactly what she needs I'd be more than happy but 10 AM is really inconvenient for clinic time    ----- Message -----  From: Codi Gutierrez, Med Ass't  Sent: 2/26/19, 7:31 AM  To: Regina Schmidt  Subject: RE: Non-Urgent Medical Question    Jan Piña,    Are you able to come in today at 10 AM?  I can get you in for just the UTI.  If you have other symptoms, we will have to schedule you another appointment.  Please advise.  Thank you.    ----- Message -----     From: Regina Schmidt     Sent: 2/26/2019  1:24 AM PST       To: Bita Lopez M.D.  Subject: Non-Urgent Medical Question    I have a UTI    Pain at end of flow  Urgency   Foul smell    Help?

## 2019-02-27 ENCOUNTER — PATIENT MESSAGE (OUTPATIENT)
Dept: MEDICAL GROUP | Facility: PHYSICIAN GROUP | Age: 49
End: 2019-02-27

## 2019-02-27 DIAGNOSIS — N30.00 ACUTE CYSTITIS WITHOUT HEMATURIA: ICD-10-CM

## 2019-02-27 RX ORDER — NITROFURANTOIN 25; 75 MG/1; MG/1
100 CAPSULE ORAL 2 TIMES DAILY
Qty: 14 CAP | Refills: 0 | Status: SHIPPED | OUTPATIENT
Start: 2019-02-27 | End: 2019-03-06

## 2019-03-05 RX ORDER — FLUCONAZOLE 150 MG/1
150 TABLET ORAL DAILY
Qty: 2 TAB | Refills: 0 | Status: SHIPPED | OUTPATIENT
Start: 2019-03-05 | End: 2019-11-22 | Stop reason: SDUPTHER

## 2019-07-16 ENCOUNTER — PATIENT MESSAGE (OUTPATIENT)
Dept: MEDICAL GROUP | Facility: PHYSICIAN GROUP | Age: 49
End: 2019-07-16

## 2019-07-16 DIAGNOSIS — Z13.29 SCREENING FOR ENDOCRINE DISORDER: ICD-10-CM

## 2019-07-16 DIAGNOSIS — Z00.00 WELL ADULT EXAM: ICD-10-CM

## 2019-07-16 DIAGNOSIS — Z98.84 BARIATRIC SURGERY STATUS: ICD-10-CM

## 2019-07-16 DIAGNOSIS — Z13.21 ENCOUNTER FOR VITAMIN DEFICIENCY SCREENING: ICD-10-CM

## 2019-07-16 DIAGNOSIS — Z13.0 SCREENING FOR DEFICIENCY ANEMIA: ICD-10-CM

## 2019-07-16 DIAGNOSIS — Z13.6 SCREENING FOR CARDIOVASCULAR CONDITION: ICD-10-CM

## 2019-08-09 ENCOUNTER — HOSPITAL ENCOUNTER (OUTPATIENT)
Dept: RADIOLOGY | Facility: MEDICAL CENTER | Age: 49
End: 2019-08-09
Attending: FAMILY MEDICINE
Payer: COMMERCIAL

## 2019-08-09 DIAGNOSIS — Z12.31 VISIT FOR SCREENING MAMMOGRAM: ICD-10-CM

## 2019-08-09 PROCEDURE — 77063 BREAST TOMOSYNTHESIS BI: CPT

## 2019-08-26 ENCOUNTER — HOSPITAL ENCOUNTER (OUTPATIENT)
Dept: LAB | Facility: MEDICAL CENTER | Age: 49
End: 2019-08-26
Attending: FAMILY MEDICINE
Payer: COMMERCIAL

## 2019-08-26 ENCOUNTER — HOSPITAL ENCOUNTER (OUTPATIENT)
Dept: LAB | Facility: MEDICAL CENTER | Age: 49
End: 2019-08-26
Payer: COMMERCIAL

## 2019-08-26 DIAGNOSIS — Z98.84 BARIATRIC SURGERY STATUS: ICD-10-CM

## 2019-08-26 DIAGNOSIS — Z13.29 SCREENING FOR ENDOCRINE DISORDER: ICD-10-CM

## 2019-08-26 DIAGNOSIS — Z13.6 SCREENING FOR CARDIOVASCULAR CONDITION: ICD-10-CM

## 2019-08-26 DIAGNOSIS — Z13.21 ENCOUNTER FOR VITAMIN DEFICIENCY SCREENING: ICD-10-CM

## 2019-08-26 DIAGNOSIS — Z13.0 SCREENING FOR DEFICIENCY ANEMIA: ICD-10-CM

## 2019-08-26 DIAGNOSIS — Z00.00 WELL ADULT EXAM: ICD-10-CM

## 2019-08-26 LAB
25(OH)D3 SERPL-MCNC: 99 NG/ML (ref 30–100)
ALBUMIN SERPL BCP-MCNC: 3.8 G/DL (ref 3.2–4.9)
ALBUMIN/GLOB SERPL: 1.6 G/DL
ALP SERPL-CCNC: 52 U/L (ref 30–99)
ALT SERPL-CCNC: 24 U/L (ref 2–50)
ANION GAP SERPL CALC-SCNC: 7 MMOL/L (ref 0–11.9)
AST SERPL-CCNC: 20 U/L (ref 12–45)
BASOPHILS # BLD AUTO: 0.5 % (ref 0–1.8)
BASOPHILS # BLD: 0.03 K/UL (ref 0–0.12)
BDY FAT % MEASURED: 39.8 %
BILIRUB SERPL-MCNC: 1.1 MG/DL (ref 0.1–1.5)
BP DIAS: 55 MMHG
BP SYS: 93 MMHG
BUN SERPL-MCNC: 12 MG/DL (ref 8–22)
CALCIUM SERPL-MCNC: 9.3 MG/DL (ref 8.5–10.5)
CHLORIDE SERPL-SCNC: 111 MMOL/L (ref 96–112)
CHOLEST SERPL-MCNC: 104 MG/DL (ref 100–199)
CHOLEST SERPL-MCNC: 96 MG/DL (ref 100–199)
CO2 SERPL-SCNC: 23 MMOL/L (ref 20–33)
CREAT SERPL-MCNC: 0.75 MG/DL (ref 0.5–1.4)
DIABETES HTDIA: NO
EOSINOPHIL # BLD AUTO: 0.07 K/UL (ref 0–0.51)
EOSINOPHIL NFR BLD: 1.3 % (ref 0–6.9)
ERYTHROCYTE [DISTWIDTH] IN BLOOD BY AUTOMATED COUNT: 53.3 FL (ref 35.9–50)
EST. AVERAGE GLUCOSE BLD GHB EST-MCNC: 65 MG/DL
EVENT NAME HTEVT: NORMAL
FASTING HTFAS: YES
FERRITIN SERPL-MCNC: 61 NG/ML (ref 10–291)
FOLATE SERPL-MCNC: 9.5 NG/ML
GLOBULIN SER CALC-MCNC: 2.4 G/DL (ref 1.9–3.5)
GLUCOSE SERPL-MCNC: 77 MG/DL (ref 65–99)
GLUCOSE SERPL-MCNC: 81 MG/DL (ref 65–99)
HBA1C MFR BLD: 3.9 % (ref 0–5.6)
HCT VFR BLD AUTO: 41.2 % (ref 37–47)
HDLC SERPL-MCNC: 49 MG/DL
HDLC SERPL-MCNC: 55 MG/DL
HGB BLD-MCNC: 13.6 G/DL (ref 12–16)
HYPERTENSION HTHYP: NO
IMM GRANULOCYTES # BLD AUTO: 0.02 K/UL (ref 0–0.11)
IMM GRANULOCYTES NFR BLD AUTO: 0.4 % (ref 0–0.9)
IRON SATN MFR SERPL: 37 % (ref 15–55)
IRON SERPL-MCNC: 106 UG/DL (ref 40–170)
LDLC SERPL CALC-MCNC: 33 MG/DL
LDLC SERPL CALC-MCNC: 34 MG/DL
LYMPHOCYTES # BLD AUTO: 1.19 K/UL (ref 1–4.8)
LYMPHOCYTES NFR BLD: 21.3 % (ref 22–41)
MAGNESIUM SERPL-MCNC: 1.9 MG/DL (ref 1.5–2.5)
MCH RBC QN AUTO: 34.8 PG (ref 27–33)
MCHC RBC AUTO-ENTMCNC: 33 G/DL (ref 33.6–35)
MCV RBC AUTO: 105.4 FL (ref 81.4–97.8)
MONOCYTES # BLD AUTO: 0.34 K/UL (ref 0–0.85)
MONOCYTES NFR BLD AUTO: 6.1 % (ref 0–13.4)
NEUTROPHILS # BLD AUTO: 3.94 K/UL (ref 2–7.15)
NEUTROPHILS NFR BLD: 70.4 % (ref 44–72)
NRBC # BLD AUTO: 0 K/UL
NRBC BLD-RTO: 0 /100 WBC
PLATELET # BLD AUTO: 162 K/UL (ref 164–446)
PMV BLD AUTO: 11.1 FL (ref 9–12.9)
POTASSIUM SERPL-SCNC: 3.6 MMOL/L (ref 3.6–5.5)
PREALB SERPL-MCNC: 20 MG/DL (ref 18–38)
PROT SERPL-MCNC: 6.2 G/DL (ref 6–8.2)
PTH-INTACT SERPL-MCNC: 43 PG/ML (ref 14–72)
RBC # BLD AUTO: 3.91 M/UL (ref 4.2–5.4)
SCREENING LOC CITY HTCIT: NORMAL
SCREENING LOC STATE HTSTA: NORMAL
SCREENING LOCATION HTLOC: NORMAL
SMOKING HTSMO: NO
SODIUM SERPL-SCNC: 141 MMOL/L (ref 135–145)
SUBSCRIBER ID HTSID: NORMAL
T3FREE SERPL-MCNC: 3.01 PG/ML (ref 2.4–4.2)
T4 FREE SERPL-MCNC: 0.86 NG/DL (ref 0.53–1.43)
TIBC SERPL-MCNC: 287 UG/DL (ref 250–450)
TRIGL SERPL-MCNC: 68 MG/DL (ref 0–149)
TRIGL SERPL-MCNC: 74 MG/DL (ref 0–149)
TSH SERPL DL<=0.005 MIU/L-ACNC: 2.87 UIU/ML (ref 0.38–5.33)
VIT B12 SERPL-MCNC: 629 PG/ML (ref 211–911)
WBC # BLD AUTO: 5.6 K/UL (ref 4.8–10.8)

## 2019-08-26 PROCEDURE — 80061 LIPID PANEL: CPT

## 2019-08-26 PROCEDURE — 84481 FREE ASSAY (FT-3): CPT

## 2019-08-26 PROCEDURE — 83036 HEMOGLOBIN GLYCOSYLATED A1C: CPT

## 2019-08-26 PROCEDURE — 84207 ASSAY OF VITAMIN B-6: CPT

## 2019-08-26 PROCEDURE — 84590 ASSAY OF VITAMIN A: CPT

## 2019-08-26 PROCEDURE — 84134 ASSAY OF PREALBUMIN: CPT

## 2019-08-26 PROCEDURE — 82306 VITAMIN D 25 HYDROXY: CPT

## 2019-08-26 PROCEDURE — 82525 ASSAY OF COPPER: CPT

## 2019-08-26 PROCEDURE — 36415 COLL VENOUS BLD VENIPUNCTURE: CPT

## 2019-08-26 PROCEDURE — 83540 ASSAY OF IRON: CPT

## 2019-08-26 PROCEDURE — 80061 LIPID PANEL: CPT | Mod: 91

## 2019-08-26 PROCEDURE — 82607 VITAMIN B-12: CPT

## 2019-08-26 PROCEDURE — 84425 ASSAY OF VITAMIN B-1: CPT

## 2019-08-26 PROCEDURE — S5190 WELLNESS ASSESSMENT BY NONPH: HCPCS

## 2019-08-26 PROCEDURE — 80053 COMPREHEN METABOLIC PANEL: CPT

## 2019-08-26 PROCEDURE — 82947 ASSAY GLUCOSE BLOOD QUANT: CPT

## 2019-08-26 PROCEDURE — 82390 ASSAY OF CERULOPLASMIN: CPT

## 2019-08-26 PROCEDURE — 84630 ASSAY OF ZINC: CPT

## 2019-08-26 PROCEDURE — 84439 ASSAY OF FREE THYROXINE: CPT

## 2019-08-26 PROCEDURE — 82746 ASSAY OF FOLIC ACID SERUM: CPT

## 2019-08-26 PROCEDURE — 82728 ASSAY OF FERRITIN: CPT

## 2019-08-26 PROCEDURE — 83970 ASSAY OF PARATHORMONE: CPT

## 2019-08-26 PROCEDURE — 83550 IRON BINDING TEST: CPT

## 2019-08-26 PROCEDURE — 84443 ASSAY THYROID STIM HORMONE: CPT

## 2019-08-26 PROCEDURE — 84591 ASSAY OF NOS VITAMIN: CPT

## 2019-08-26 PROCEDURE — 84255 ASSAY OF SELENIUM: CPT

## 2019-08-26 PROCEDURE — 83735 ASSAY OF MAGNESIUM: CPT

## 2019-08-26 PROCEDURE — 85025 COMPLETE CBC W/AUTO DIFF WBC: CPT

## 2019-08-27 ENCOUNTER — PATIENT MESSAGE (OUTPATIENT)
Dept: MEDICAL GROUP | Facility: PHYSICIAN GROUP | Age: 49
End: 2019-08-27

## 2019-08-27 ENCOUNTER — APPOINTMENT (RX ONLY)
Dept: URBAN - METROPOLITAN AREA CLINIC 4 | Facility: CLINIC | Age: 49
Setting detail: DERMATOLOGY
End: 2019-08-27

## 2019-08-27 DIAGNOSIS — Z71.89 OTHER SPECIFIED COUNSELING: ICD-10-CM

## 2019-08-27 DIAGNOSIS — L72.8 OTHER FOLLICULAR CYSTS OF THE SKIN AND SUBCUTANEOUS TISSUE: ICD-10-CM

## 2019-08-27 DIAGNOSIS — L82.1 OTHER SEBORRHEIC KERATOSIS: ICD-10-CM

## 2019-08-27 DIAGNOSIS — D22 MELANOCYTIC NEVI: ICD-10-CM

## 2019-08-27 PROBLEM — D22.39 MELANOCYTIC NEVI OF OTHER PARTS OF FACE: Status: ACTIVE | Noted: 2019-08-27

## 2019-08-27 LAB
CERULOPLASMIN SERPL-MCNC: 20 MG/DL (ref 17–54)
COPPER SERPL-MCNC: 84.6 UG/DL (ref 80–155)
SELENIUM SERPL-MCNC: 98 UG/L (ref 23–190)

## 2019-08-27 PROCEDURE — 99202 OFFICE O/P NEW SF 15 MIN: CPT

## 2019-08-27 PROCEDURE — ? ADDITIONAL NOTES

## 2019-08-27 PROCEDURE — ? COUNSELING

## 2019-08-27 PROCEDURE — ? OBSERVATION AND MEASURE

## 2019-08-27 ASSESSMENT — LOCATION DETAILED DESCRIPTION DERM
LOCATION DETAILED: RIGHT OCCIPITAL SCALP
LOCATION DETAILED: LEFT INFERIOR LATERAL NECK
LOCATION DETAILED: INFERIOR MID FOREHEAD

## 2019-08-27 ASSESSMENT — LOCATION ZONE DERM
LOCATION ZONE: SCALP
LOCATION ZONE: NECK
LOCATION ZONE: FACE

## 2019-08-27 ASSESSMENT — LOCATION SIMPLE DESCRIPTION DERM
LOCATION SIMPLE: LEFT ANTERIOR NECK
LOCATION SIMPLE: INFERIOR FOREHEAD
LOCATION SIMPLE: POSTERIOR SCALP

## 2019-08-27 NOTE — PROCEDURE: ADDITIONAL NOTES
Additional Notes: Offered pt. Biopsy. She decided to observe for changes and to have us recheck in 6 mo. If biopsied, could do scoop shave ( or punch biopsy)
Detail Level: Simple
Additional Notes: Includes area of concern on scalp

## 2019-08-28 ENCOUNTER — OFFICE VISIT (OUTPATIENT)
Dept: MEDICAL GROUP | Facility: PHYSICIAN GROUP | Age: 49
End: 2019-08-28
Payer: COMMERCIAL

## 2019-08-28 VITALS
DIASTOLIC BLOOD PRESSURE: 62 MMHG | HEART RATE: 74 BPM | TEMPERATURE: 99.6 F | OXYGEN SATURATION: 99 % | BODY MASS INDEX: 29.95 KG/M2 | SYSTOLIC BLOOD PRESSURE: 100 MMHG | WEIGHT: 169 LBS | HEIGHT: 63 IN | RESPIRATION RATE: 12 BRPM

## 2019-08-28 DIAGNOSIS — G43.909 MIGRAINE WITHOUT STATUS MIGRAINOSUS, NOT INTRACTABLE, UNSPECIFIED MIGRAINE TYPE: ICD-10-CM

## 2019-08-28 DIAGNOSIS — E03.9 ACQUIRED HYPOTHYROIDISM: ICD-10-CM

## 2019-08-28 DIAGNOSIS — R79.89 ABNORMAL CBC: ICD-10-CM

## 2019-08-28 DIAGNOSIS — R11.0 NAUSEA: ICD-10-CM

## 2019-08-28 DIAGNOSIS — R11.2 NAUSEA AND VOMITING: ICD-10-CM

## 2019-08-28 DIAGNOSIS — Z00.00 WELL ADULT EXAM: ICD-10-CM

## 2019-08-28 LAB
ANNOTATION COMMENT IMP: NORMAL
RETINYL PALMITATE SERPL-MCNC: <0.02 MG/L (ref 0–0.1)
VIT A SERPL-MCNC: 0.51 MG/L (ref 0.3–1.2)
VIT B6 SERPL-MCNC: 80.7 NMOL/L (ref 20–125)
ZINC SERPL-MCNC: 58.7 UG/DL (ref 60–120)

## 2019-08-28 PROCEDURE — 99396 PREV VISIT EST AGE 40-64: CPT | Performed by: FAMILY MEDICINE

## 2019-08-28 RX ORDER — SUMATRIPTAN 100 MG/1
100 TABLET, FILM COATED ORAL
Qty: 10 TAB | Refills: 3 | Status: SHIPPED | OUTPATIENT
Start: 2019-08-28 | End: 2020-06-23

## 2019-08-28 RX ORDER — ONDANSETRON 4 MG/1
TABLET, FILM COATED ORAL
Qty: 12 TAB | Refills: 11 | Status: ON HOLD | OUTPATIENT
Start: 2019-08-28 | End: 2022-07-12

## 2019-08-28 RX ORDER — LEVOTHYROXINE SODIUM 88 MCG
88 TABLET ORAL
Qty: 90 TAB | Refills: 3 | Status: SHIPPED | OUTPATIENT
Start: 2019-08-28 | End: 2020-09-11 | Stop reason: SDUPTHER

## 2019-08-28 RX ORDER — ST. JOHN'S WORT 300 MG
1 CAPSULE ORAL 2 TIMES DAILY
COMMUNITY

## 2019-08-28 RX ORDER — TOPIRAMATE 25 MG/1
TABLET ORAL
Qty: 180 TAB | Refills: 3 | Status: SHIPPED | OUTPATIENT
Start: 2019-08-28 | End: 2020-09-11 | Stop reason: SDUPTHER

## 2019-08-28 RX ORDER — METOCLOPRAMIDE 10 MG/1
10 TABLET ORAL
Qty: 60 TAB | Refills: 0 | Status: SHIPPED | OUTPATIENT
Start: 2019-08-28 | End: 2020-02-04

## 2019-08-28 RX ORDER — ELETRIPTAN HYDROBROMIDE 40 MG/1
40 TABLET, FILM COATED ORAL
Qty: 10 TAB | Refills: 11 | Status: SHIPPED | OUTPATIENT
Start: 2019-08-28 | End: 2019-08-28

## 2019-08-28 SDOH — HEALTH STABILITY: MENTAL HEALTH: HOW MANY STANDARD DRINKS CONTAINING ALCOHOL DO YOU HAVE ON A TYPICAL DAY?: 1 OR 2

## 2019-08-28 SDOH — HEALTH STABILITY: MENTAL HEALTH: HOW OFTEN DO YOU HAVE 6 OR MORE DRINKS ON ONE OCCASION?: NEVER

## 2019-08-28 SDOH — HEALTH STABILITY: MENTAL HEALTH: HOW OFTEN DO YOU HAVE A DRINK CONTAINING ALCOHOL?: 2-3 TIMES A WEEK

## 2019-08-28 ASSESSMENT — PATIENT HEALTH QUESTIONNAIRE - PHQ9
7. TROUBLE CONCENTRATING ON THINGS, SUCH AS READING THE NEWSPAPER OR WATCHING TELEVISION: MORE THAN HALF THE DAYS
SUM OF ALL RESPONSES TO PHQ9 QUESTIONS 1 AND 2: 1
9. THOUGHTS THAT YOU WOULD BE BETTER OFF DEAD, OR OF HURTING YOURSELF: NOT AT ALL
5. POOR APPETITE OR OVEREATING: MORE THAN HALF THE DAYS
8. MOVING OR SPEAKING SO SLOWLY THAT OTHER PEOPLE COULD HAVE NOTICED. OR THE OPPOSITE, BEING SO FIGETY OR RESTLESS THAT YOU HAVE BEEN MOVING AROUND A LOT MORE THAN USUAL: NOT AT ALL
3. TROUBLE FALLING OR STAYING ASLEEP OR SLEEPING TOO MUCH: MORE THAN HALF THE DAYS
4. FEELING TIRED OR HAVING LITTLE ENERGY: MORE THAN HALF THE DAYS
6. FEELING BAD ABOUT YOURSELF - OR THAT YOU ARE A FAILURE OR HAVE LET YOURSELF OR YOUR FAMILY DOWN: NOT AL ALL
1. LITTLE INTEREST OR PLEASURE IN DOING THINGS: NOT AT ALL
SUM OF ALL RESPONSES TO PHQ QUESTIONS 1-9: 9
2. FEELING DOWN, DEPRESSED, IRRITABLE, OR HOPELESS: SEVERAL DAYS

## 2019-08-28 NOTE — PROGRESS NOTES
Chief Complaint   Patient presents with   • Annual Exam       HISTORY OF PRESENT ILLNESS: Patient is a 48 y.o. female new patient who presents today to discuss the following issues:    1. Well adult exam  Here for annual preventive visit including lab review and medication refill.         Active Ambulatory Problems     Diagnosis Date Noted   • Vitamin D deficiency 02/04/2010   • Iron deficiency anemia 02/04/2010   • Lymphedema 01/28/2011   • Hypothyroid 02/09/2011   • Migraine headache 08/23/2012   • Elevated parathyroid hormone 03/07/2014   • Fatigue 10/13/2014   • Reactive depression 11/12/2014   • Neck pain, musculoskeletal 11/25/2015   • Bariatric surgery status 09/14/2016   • Anxiety 09/14/2016     Resolved Ambulatory Problems     Diagnosis Date Noted   • S/P gastric bypass 02/04/2010   • Impacted cerumen of both ears 10/13/2014   • Abnormal mammogram 06/02/2015   • Acute pansinusitis 11/25/2015     Past Medical History:   Diagnosis Date   • S/P tonsillectomy and adenoidectomy        Allergies:Moxifloxacin    Current Outpatient Medications   Medication Sig Dispense Refill   • eletriptan (RELPAX) 40 MG tablet Take 1 Tab by mouth Once PRN for up to 1 dose. 10 Tab 11   • topiramate (TOPAMAX) 25 MG Tab TAKE 1 TABLET BY MOUTH TWICE DAILY FOR     MIGRAINE HEADACHE -GENERIC FOR TOPAMAX 180 Tab 3   • SYNTHROID 88 MCG Tab Take 1 Tab by mouth Every morning on an empty stomach. TAKE 1 TABLET BY MOUTH DAILY 90 Tab 3   • ondansetron (ZOFRAN) 4 MG Tab tablet TAKE 1 TABLET BY MOUTH EVERY 8 HOURS IFNEEDED FOR NAUSEA AND VOMITING **GENERICFOR ZOFRAN 12 Tab 11   • metoclopramide (REGLAN) 10 MG Tab Take 1 Tab by mouth 3 times a day before meals. 60 Tab 0   • St Johns Wort 300 MG Cap Take 1 Cap by mouth 2 Times a Day.     • 5-Hydroxytryptophan (5-HTP PO) Take 200 mg by mouth every day.     • sulfacetamide (SULAMYD) 10 % Ointment 1 application daily 1 Tube 0   • fluticasone (FLONASE) 50 MCG/ACT nasal spray Spray 2 Sprays in nose  every day. Each Nostril 3 Bottle 3   • fluconazole (DIFLUCAN) 150 MG tablet Take 1 Tab by mouth every day. 1 Tab 1   • metaxalone (SKELAXIN) 800 MG Tab Take 1 Tab by mouth 3 times a day. 90 Tab 3   • alprazolam (XANAX) 0.25 MG Tab Take 1 Tab by mouth at bedtime as needed for Sleep or Anxiety. Indications: Feeling Anxious, Trouble Sleeping 30 Tab 1   • levonorgestrel (MIRENA) 20 MCG/24HR IUD 1 Each by Intrauterine route Once.     • Probiotic Product (PROBIOTIC PO) Take 1 Tab by mouth 3 times a day.     • Ferrous Fumarate 325 (106 FE) MG TABS Take  by mouth 3 times a day.     • MULTI VIT/FL PO Take  by mouth.     • CALCIUM CITRATE 250 MG PO TABS Take 8 Each by mouth.     • VITAMIN D3 885845 UNIT/GM POWD 50,000 Each by Does not apply route 3 times a day.     • VITAMIN A 25,000 Int'l Units by Does not apply route.     • VITAMIN C 500 MG PO TABS Take 1,000 mg by mouth 2 Times a Day.     • GLUCOSAMINE CHONDRO COMPLEX PO Take  by mouth 2 Times a Day.     • fluconazole (DIFLUCAN) 150 MG tablet Take 1 Tab by mouth every day. (Patient not taking: Reported on 8/28/2019) 2 Tab 0   • doxycycline (VIBRAMYCIN) 100 MG Tab Take 1 Tab by mouth 2 times a day. (Patient not taking: Reported on 1/9/2019) 20 Tab 0   • azithromycin (ZITHROMAX) 250 MG Tab Take 2 tabs day 1. Take 1 tab day 2-5 #6 (Patient not taking: Reported on 1/9/2019) 6 Tab 0   • nitrofurantoin monohydr macro (MACROBID) 100 MG Cap Take 1 Cap by mouth 2 times a day. (Patient not taking: Reported on 1/9/2019) 14 Cap 0   • amoxicillin-clavulanate (AUGMENTIN) 875-125 MG Tab Take 1 Tab by mouth 2 times a day. (Patient not taking: Reported on 1/9/2019) 14 Tab 0   • ondansetron (ZOFRAN ODT) 8 MG TABLET DISPERSIBLE Take 1 Tab by mouth every 8 hours as needed for Nausea/Vomiting. (Patient not taking: Reported on 8/28/2019) 15 Tab 0   • Zinc 50 MG CAPS Take  by mouth.     • NAPROSYN 250 MG PO TABS Take 250 mg by mouth Once.       No current facility-administered medications for  this visit.        Social History     Tobacco Use   • Smoking status: Never Smoker   • Smokeless tobacco: Never Used   Substance Use Topics   • Alcohol use: Yes     Alcohol/week: 0.0 oz     Frequency: 2-3 times a week     Drinks per session: 1 or 2     Binge frequency: Never     Comment: occasionally   • Drug use: No       Family Status   Relation Name Status   • Mo  Alive        hypothyroid   • Fa     • MGMo     • PGFa     • Sis  Alive   • Bro  Alive   • MGFa          MVA   • PGMo          alzhiemers, infection   • MAunt  (Not Specified)     Family History   Problem Relation Age of Onset   • Hypertension Mother    • Cancer Mother         breast cancer   • Arthritis Mother    • Cancer Father         lymphoma ,BCC,colon cancer 73   • Hypertension Father    • Alcohol/Drug Father    • Alcohol/Drug Maternal Grandmother         prescription medications, renal failure   • Cancer Maternal Grandmother    • Stroke Paternal Grandfather    • Cancer Maternal Aunt         breast     There are no preventive care reminders to display for this patient.    ROS:  Review of Systems   Constitutional: Negative for fever, chills, weight loss and malaise/fatigue.   HENT: Negative for ear pain, nosebleeds, congestion, sore throat and neck pain.    Eyes: Negative for blurred vision.   Respiratory: Negative for cough, sputum production, shortness of breath and wheezing.    Cardiovascular: Negative for chest pain, palpitations, orthopnea and leg swelling.   Gastrointestinal: Negative for heartburn, nausea, vomiting and abdominal pain.   Genitourinary: Negative for dysuria, urgency and frequency.   Musculoskeletal: Negative for myalgias, back pain and joint pain.   Skin: Negative for rash and itching.   Neurological: Negative for dizziness, tingling, tremors, sensory change, focal weakness and headaches.   Endo/Heme/Allergies: Does not bruise/bleed easily.   Psychiatric/Behavioral: Negative for  "depression, suicidal ideas and memory loss.  The patient is not nervous/anxious and does not have insomnia.      Exam:  /62   Pulse 74   Temp 37.6 °C (99.6 °F)   Resp 12   Ht 1.6 m (5' 3\")   Wt 76.7 kg (169 lb)   SpO2 99%   General:  Well nourished, well developed female in NAD  HEENT: Normocephalic and atraumatic. TMs clear bilaterally. Oropharynx is clear      without erythema and no tonsillar exudate. Nasal mucosa pink with minimal      Rhinorrhea.  EYES: EOMI.  Conjunctiva clear.    Neck: Supple without JVD or bruit. Thyroid is not enlarged.  Pulmonary: Clear to ausculation and percussion.  Normal effort. No rales, ronchi, or wheezing.  Cardiovascular: Regular rate and rhythm without murmur, no peripheral edema  Abdomen: positive bowel sounds.  Non tender, non distended, no hepatosplenomegaly.   MSK: normal ROM in upper and lower extremities bilaterally  Psych: appropriate affect and concentration, oriented to person and place  Neuro: no unilateral weakness in upper or lower extremities.  No gait disturbance    Please note that this dictation was created using voice recognition software. I have made every reasonable attempt to correct obvious errors, but I expect that there are errors of grammar and possibly content that I did not discover before finalizing the note.    Assessment/Plan:  1. Well adult exam  Continue healthy lifestyle, return to exercise.     2. Abnormal CBC  Recheck in 3 months  - CBC WITH DIFFERENTIAL; Future    3. Migraine without status migrainosus, not intractable, unspecified migraine type    - eletriptan (RELPAX) 40 MG tablet; Take 1 Tab by mouth Once PRN for up to 1 dose.  Dispense: 10 Tab; Refill: 11  - topiramate (TOPAMAX) 25 MG Tab; TAKE 1 TABLET BY MOUTH TWICE DAILY FOR     MIGRAINE HEADACHE -GENERIC FOR TOPAMAX  Dispense: 180 Tab; Refill: 3  - ondansetron (ZOFRAN) 4 MG Tab tablet; TAKE 1 TABLET BY MOUTH EVERY 8 HOURS IFNEEDED FOR NAUSEA AND VOMITING **GENERICFOR ZOFRAN  " Dispense: 12 Tab; Refill: 11  - metoclopramide (REGLAN) 10 MG Tab; Take 1 Tab by mouth 3 times a day before meals.  Dispense: 60 Tab; Refill: 0    4.  Acquired hypothyroidism  - SYNTHROID 88 MCG Tab; Take 1 Tab by mouth Every morning on an empty stomach. TAKE 1 TABLET BY MOUTH DAILY  Dispense: 90 Tab; Refill: 3      3-12 months

## 2019-08-29 LAB — VIT B1 BLD-MCNC: 124 NMOL/L (ref 70–180)

## 2019-09-06 LAB — NIACIN SERPL-MCNC: 4.25 UG/ML (ref 0.5–8.45)

## 2019-09-17 DIAGNOSIS — N76.0 BV (BACTERIAL VAGINOSIS): ICD-10-CM

## 2019-09-17 DIAGNOSIS — B96.89 BV (BACTERIAL VAGINOSIS): ICD-10-CM

## 2019-09-17 RX ORDER — METRONIDAZOLE 7.5 MG/G
1 GEL VAGINAL
Qty: 1 TUBE | Refills: 1 | Status: SHIPPED | OUTPATIENT
Start: 2019-09-17 | End: 2019-09-22

## 2019-09-25 ENCOUNTER — IMMUNIZATION (OUTPATIENT)
Dept: OCCUPATIONAL MEDICINE | Facility: CLINIC | Age: 49
End: 2019-09-25

## 2019-09-25 DIAGNOSIS — Z23 NEED FOR VACCINATION: ICD-10-CM

## 2019-09-25 PROCEDURE — 90686 IIV4 VACC NO PRSV 0.5 ML IM: CPT | Performed by: PREVENTIVE MEDICINE

## 2019-11-04 ENCOUNTER — PATIENT MESSAGE (OUTPATIENT)
Dept: MEDICAL GROUP | Facility: PHYSICIAN GROUP | Age: 49
End: 2019-11-04

## 2019-11-05 RX ORDER — AMOXICILLIN AND CLAVULANATE POTASSIUM 875; 125 MG/1; MG/1
1 TABLET, FILM COATED ORAL 2 TIMES DAILY
Qty: 20 TAB | Refills: 0 | Status: SHIPPED | OUTPATIENT
Start: 2019-11-05 | End: 2019-11-15

## 2019-11-06 ENCOUNTER — PATIENT MESSAGE (OUTPATIENT)
Dept: MEDICAL GROUP | Facility: PHYSICIAN GROUP | Age: 49
End: 2019-11-06

## 2019-11-06 NOTE — TELEPHONE ENCOUNTER
From: Regina Schmidt  To: Bita Lopez M.D.  Sent: 11/6/2019 9:47 AM PST  Subject: Non-Urgent Medical Question    Bita,    My daughter Lavern Petty 11- (also your patient) had the same symptoms we aren't sure who gave it to whom.    You have no available spots today.    Would you be willing to send in the same for her?    Thanks in advance     Regina     ----- Message -----  From: Bita Lopez M.D.  Sent: 11/5/19, 7:57 AM  To: Regina Schmidt  Subject: RE: Non-Urgent Medical Question    I sent in augmentin to your pharmacy, [facility]. Hope you feel better soon. If symptoms are not better upon completion of the course please come see me!    Bita    ----- Message -----   From: Regina Schmidt   Sent: 11/4/2019 1:18 PM PST   To: Bita Lopez M.D.  Subject: Non-Urgent Medical Question    Can I get something for a sinus infection without being seen?    I've been nursing it a long for a few weeks now to no avail.    Thank you in advance

## 2019-11-25 RX ORDER — FLUCONAZOLE 150 MG/1
150 TABLET ORAL DAILY
Qty: 2 TAB | Refills: 0 | Status: SHIPPED | OUTPATIENT
Start: 2019-11-25 | End: 2020-01-20 | Stop reason: SDUPTHER

## 2020-01-21 RX ORDER — FLUCONAZOLE 150 MG/1
150 TABLET ORAL DAILY
Qty: 2 TAB | Refills: 0 | Status: ON HOLD | OUTPATIENT
Start: 2020-01-21 | End: 2022-07-12

## 2020-02-03 DIAGNOSIS — G43.909 MIGRAINE WITHOUT STATUS MIGRAINOSUS, NOT INTRACTABLE, UNSPECIFIED MIGRAINE TYPE: ICD-10-CM

## 2020-02-04 RX ORDER — METOCLOPRAMIDE 10 MG/1
TABLET ORAL
Qty: 60 TAB | Refills: 1 | Status: ON HOLD | OUTPATIENT
Start: 2020-02-04 | End: 2022-07-12

## 2020-02-19 ENCOUNTER — HOSPITAL ENCOUNTER (OUTPATIENT)
Facility: MEDICAL CENTER | Age: 50
End: 2020-02-19
Attending: FAMILY MEDICINE
Payer: COMMERCIAL

## 2020-02-19 ENCOUNTER — TELEPHONE (OUTPATIENT)
Dept: MEDICAL GROUP | Facility: PHYSICIAN GROUP | Age: 50
End: 2020-02-19

## 2020-02-19 ENCOUNTER — OFFICE VISIT (OUTPATIENT)
Dept: MEDICAL GROUP | Facility: PHYSICIAN GROUP | Age: 50
End: 2020-02-19
Payer: COMMERCIAL

## 2020-02-19 VITALS
TEMPERATURE: 98.8 F | BODY MASS INDEX: 29.95 KG/M2 | HEIGHT: 63 IN | HEART RATE: 84 BPM | OXYGEN SATURATION: 97 % | WEIGHT: 169 LBS | SYSTOLIC BLOOD PRESSURE: 102 MMHG | RESPIRATION RATE: 16 BRPM | DIASTOLIC BLOOD PRESSURE: 68 MMHG

## 2020-02-19 DIAGNOSIS — N30.01 ACUTE CYSTITIS WITH HEMATURIA: ICD-10-CM

## 2020-02-19 DIAGNOSIS — R21 RASH: ICD-10-CM

## 2020-02-19 DIAGNOSIS — R58 ECCHYMOSIS: ICD-10-CM

## 2020-02-19 DIAGNOSIS — R30.9 PAINFUL URINATION: ICD-10-CM

## 2020-02-19 DIAGNOSIS — J01.90 SUBACUTE SINUSITIS, UNSPECIFIED LOCATION: ICD-10-CM

## 2020-02-19 LAB
APPEARANCE UR: NORMAL
BILIRUB UR STRIP-MCNC: NORMAL MG/DL
COLOR UR AUTO: NORMAL
GLUCOSE UR STRIP.AUTO-MCNC: 100 MG/DL
KETONES UR STRIP.AUTO-MCNC: NORMAL MG/DL
LEUKOCYTE ESTERASE UR QL STRIP.AUTO: NORMAL
NITRITE UR QL STRIP.AUTO: POSITIVE
PH UR STRIP.AUTO: 5 [PH] (ref 5–8)
PROT UR QL STRIP: NORMAL MG/DL
RBC UR QL AUTO: NORMAL
SP GR UR STRIP.AUTO: 1
UROBILINOGEN UR STRIP-MCNC: 1 MG/DL

## 2020-02-19 PROCEDURE — 87086 URINE CULTURE/COLONY COUNT: CPT

## 2020-02-19 PROCEDURE — 81002 URINALYSIS NONAUTO W/O SCOPE: CPT | Performed by: FAMILY MEDICINE

## 2020-02-19 PROCEDURE — 99214 OFFICE O/P EST MOD 30 MIN: CPT | Performed by: FAMILY MEDICINE

## 2020-02-19 RX ORDER — NITROFURANTOIN 25; 75 MG/1; MG/1
100 CAPSULE ORAL 2 TIMES DAILY
Qty: 14 CAP | Refills: 0 | Status: CANCELLED | OUTPATIENT
Start: 2020-02-19 | End: 2020-02-26

## 2020-02-19 RX ORDER — FLUCONAZOLE 150 MG/1
150 TABLET ORAL DAILY
Qty: 2 TAB | Refills: 0 | Status: SHIPPED | OUTPATIENT
Start: 2020-02-19 | End: 2020-06-23 | Stop reason: SDUPTHER

## 2020-02-19 RX ORDER — SULFAMETHOXAZOLE AND TRIMETHOPRIM 800; 160 MG/1; MG/1
1 TABLET ORAL 2 TIMES DAILY
Qty: 14 TAB | Refills: 0 | Status: SHIPPED | OUTPATIENT
Start: 2020-02-19 | End: 2020-02-26

## 2020-02-19 NOTE — TELEPHONE ENCOUNTER
----- Message from Regina Schmdit sent at 2/19/2020  7:01 AM PST -----  Regarding: RE: Non-Urgent Medical Question  Contact: [phone]  C[place]     If it's for a urine specimen I prefer to do it at [place] so I don't have to leave work.    [place] is approx 30-35 mins from the main campus (no traffic) so that means 90 mins to two hours off of work.    Regina    ----- Message -----  From: Codi Gutierrez, Med Ass't  Sent: 2/19/20, 6:58 AM  To: Regina Schmidt  Subject: RE: Non-Urgent Medical Question    Jan Tapia,    Are you able to come in today at 9:20 AM?  I have put you on for that time so it does not get taken.  Please advise.  Thank you.      ----- Message -----       From:Regina Schmidt       Sent:2/19/2020  6:07 AM PST         To:Bita Lopez M.D.    Subject:Non-Urgent Medical Question    UTI symptoms   First available appointment is 3/17  I can do a urine specimen at [place] today if needed   Symptoms started Sunday with bladder spasms   Yesterday was minor but by evening end I had more spasms and end stream burning and discomfort       - - - DISCLAIMER - - -  [email or web address][This message may contain formatting that cannot be shown on this site.]
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
## 2020-02-21 LAB
BACTERIA UR CULT: NORMAL
SIGNIFICANT IND 70042: NORMAL
SITE SITE: NORMAL
SOURCE SOURCE: NORMAL

## 2020-06-23 RX ORDER — SUMATRIPTAN 100 MG/1
100 TABLET, FILM COATED ORAL
Qty: 10 TAB | Refills: 3 | Status: SHIPPED | OUTPATIENT
Start: 2020-06-23 | End: 2020-09-11 | Stop reason: SDUPTHER

## 2020-06-23 RX ORDER — SUMATRIPTAN 100 MG/1
TABLET, FILM COATED ORAL
Qty: 10 TAB | Refills: 3 | Status: SHIPPED | OUTPATIENT
Start: 2020-06-23 | End: 2022-12-05 | Stop reason: SDUPTHER

## 2020-07-22 ENCOUNTER — TELEPHONE (OUTPATIENT)
Dept: MEDICAL GROUP | Facility: PHYSICIAN GROUP | Age: 50
End: 2020-07-22

## 2020-07-22 DIAGNOSIS — R30.9 PAINFUL URINATION: ICD-10-CM

## 2020-07-22 RX ORDER — SULFAMETHOXAZOLE AND TRIMETHOPRIM 800; 160 MG/1; MG/1
1 TABLET ORAL 2 TIMES DAILY
Qty: 14 TAB | Refills: 0 | Status: SHIPPED | OUTPATIENT
Start: 2020-07-22 | End: 2020-07-29

## 2020-07-22 NOTE — TELEPHONE ENCOUNTER
----- Message from Regina Schmidt sent at 7/22/2020  4:21 AM PDT -----  Regarding: Non-Urgent Medical Question  Contact: [phone]  Bita,    I definitely have another UTI     Will you please send in a script?    Thank you in advance,    Regina

## 2020-07-22 NOTE — TELEPHONE ENCOUNTER
Not sure if you want to prescribe something or have the patient give a urine sample.  Urinalysis, culture if indicated is pending if you want that.  Please advise.  Thank you.

## 2020-07-22 NOTE — TELEPHONE ENCOUNTER
Ok sent in Bactrim for 1 week.  Recommend that if possible she get the UA so we can alter treatment if not sensitive.

## 2020-08-10 ENCOUNTER — HOSPITAL ENCOUNTER (OUTPATIENT)
Dept: RADIOLOGY | Facility: MEDICAL CENTER | Age: 50
End: 2020-08-10
Attending: FAMILY MEDICINE
Payer: COMMERCIAL

## 2020-08-10 DIAGNOSIS — Z12.31 VISIT FOR SCREENING MAMMOGRAM: ICD-10-CM

## 2020-08-10 PROCEDURE — 77067 SCR MAMMO BI INCL CAD: CPT

## 2020-09-11 ENCOUNTER — TELEMEDICINE (OUTPATIENT)
Dept: MEDICAL GROUP | Facility: PHYSICIAN GROUP | Age: 50
End: 2020-09-11
Payer: COMMERCIAL

## 2020-09-11 VITALS
SYSTOLIC BLOOD PRESSURE: 98 MMHG | TEMPERATURE: 98.3 F | BODY MASS INDEX: 27.11 KG/M2 | WEIGHT: 153 LBS | HEART RATE: 67 BPM | RESPIRATION RATE: 12 BRPM | DIASTOLIC BLOOD PRESSURE: 58 MMHG | HEIGHT: 63 IN

## 2020-09-11 DIAGNOSIS — Z98.84 BARIATRIC SURGERY STATUS: ICD-10-CM

## 2020-09-11 DIAGNOSIS — E03.9 ACQUIRED HYPOTHYROIDISM: ICD-10-CM

## 2020-09-11 DIAGNOSIS — R79.89 ABNORMAL CBC: ICD-10-CM

## 2020-09-11 DIAGNOSIS — Z13.21 ENCOUNTER FOR VITAMIN DEFICIENCY SCREENING: ICD-10-CM

## 2020-09-11 DIAGNOSIS — R11.2 NAUSEA AND VOMITING: ICD-10-CM

## 2020-09-11 DIAGNOSIS — G43.909 MIGRAINE WITHOUT STATUS MIGRAINOSUS, NOT INTRACTABLE, UNSPECIFIED MIGRAINE TYPE: ICD-10-CM

## 2020-09-11 DIAGNOSIS — Z13.0 SCREENING FOR DEFICIENCY ANEMIA: ICD-10-CM

## 2020-09-11 DIAGNOSIS — M54.2 NECK PAIN, MUSCULOSKELETAL: ICD-10-CM

## 2020-09-11 DIAGNOSIS — F32.9 REACTIVE DEPRESSION: ICD-10-CM

## 2020-09-11 DIAGNOSIS — Z13.6 SCREENING FOR CARDIOVASCULAR CONDITION: ICD-10-CM

## 2020-09-11 DIAGNOSIS — J01.90 SUBACUTE SINUSITIS, UNSPECIFIED LOCATION: ICD-10-CM

## 2020-09-11 PROCEDURE — 99214 OFFICE O/P EST MOD 30 MIN: CPT | Mod: 95,CR | Performed by: FAMILY MEDICINE

## 2020-09-11 RX ORDER — SUMATRIPTAN 100 MG/1
100 TABLET, FILM COATED ORAL
Qty: 30 TAB | Refills: 3 | Status: ON HOLD | OUTPATIENT
Start: 2020-09-11 | End: 2022-07-12

## 2020-09-11 RX ORDER — ALPRAZOLAM 0.25 MG/1
0.25 TABLET ORAL NIGHTLY PRN
Qty: 90 TAB | Refills: 0 | Status: SHIPPED | OUTPATIENT
Start: 2020-09-11 | End: 2020-10-23

## 2020-09-11 RX ORDER — PHENAZOPYRIDINE HYDROCHLORIDE 200 MG/1
TABLET, FILM COATED ORAL
COMMUNITY
Start: 2020-07-22 | End: 2020-07-25

## 2020-09-11 RX ORDER — CEFDINIR 300 MG/1
300 CAPSULE ORAL 2 TIMES DAILY
Qty: 20 CAP | Refills: 0 | Status: SHIPPED | OUTPATIENT
Start: 2020-09-11 | End: 2020-09-21

## 2020-09-11 RX ORDER — ONDANSETRON 8 MG/1
8 TABLET, ORALLY DISINTEGRATING ORAL EVERY 8 HOURS PRN
Qty: 45 TAB | Refills: 3 | Status: ON HOLD | OUTPATIENT
Start: 2020-09-11 | End: 2022-07-12

## 2020-09-11 RX ORDER — METAXALONE 800 MG/1
800 TABLET ORAL 3 TIMES DAILY
Qty: 90 TAB | Refills: 3 | Status: ON HOLD | OUTPATIENT
Start: 2020-09-11 | End: 2022-07-12

## 2020-09-11 RX ORDER — LEVOTHYROXINE SODIUM 88 MCG
88 TABLET ORAL
Qty: 90 TAB | Refills: 3 | Status: SHIPPED | OUTPATIENT
Start: 2020-09-11 | End: 2020-10-22

## 2020-09-11 RX ORDER — FLUCONAZOLE 150 MG/1
150 TABLET ORAL DAILY
Qty: 2 TAB | Refills: 0 | Status: ON HOLD | OUTPATIENT
Start: 2020-09-11 | End: 2022-07-12

## 2020-09-11 RX ORDER — NITROFURANTOIN 25; 75 MG/1; MG/1
CAPSULE ORAL
COMMUNITY
Start: 2020-07-22 | End: 2020-07-27

## 2020-09-11 RX ORDER — TOPIRAMATE 25 MG/1
TABLET ORAL
Qty: 180 TAB | Refills: 3 | Status: SHIPPED | OUTPATIENT
Start: 2020-09-11 | End: 2022-12-05 | Stop reason: SDUPTHER

## 2020-09-11 ASSESSMENT — FIBROSIS 4 INDEX: FIB4 SCORE: 1.18

## 2020-09-11 NOTE — PROGRESS NOTES
Chief Complaint   Patient presents with   • Requesting Labs   • Sinus Problem       HISTORY OF PRESENT ILLNESS: Patient is a 49 y.o. female established patient here today for the following concerns:    This encounter was conducted via Zoom .   Verbal consent was obtained. Patient's identity was verified.    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  She reports she has been having more migraines lately, decreased hearing acuity.  She feels a lot of sinus pressure despite allergy flonase and nasal saline rinses.  No fevers, chills.      Past Medical, Social, and Family history reviewed and updated in EPIC    Allergies:Moxifloxacin    Current Outpatient Medications   Medication Sig Dispense Refill   • sumatriptan (IMITREX) 100 MG tablet Take 1 Tab by mouth Once PRN for Migraine for up to 1 dose. 30 Tab 3   • metaxalone (SKELAXIN) 800 MG Tab Take 1 Tab by mouth 3 times a day. 90 Tab 3   • ALPRAZolam (XANAX) 0.25 MG Tab Take 1 Tab by mouth at bedtime as needed for Sleep or Anxiety for up to 90 days. Indications: Feeling Anxious, Trouble Sleeping 90 Tab 0   • ondansetron (ZOFRAN ODT) 8 MG TABLET DISPERSIBLE Take 1 Tab by mouth every 8 hours as needed. 45 Tab 3   • cefdinir (OMNICEF) 300 MG Cap Take 1 Cap by mouth 2 times a day for 10 days. 20 Cap 0   • fluconazole (DIFLUCAN) 150 MG tablet Take 1 Tab by mouth every day. May repeat in 2 days 2 Tab 0   • topiramate (TOPAMAX) 25 MG Tab TAKE 1 TABLET BY MOUTH TWICE DAILY FOR     MIGRAINE HEADACHE -GENERIC FOR TOPAMAX 180 Tab 3   • SYNTHROID 88 MCG Tab Take 1 Tab by mouth Every morning on an empty stomach. TAKE 1 TABLET BY MOUTH DAILY 90 Tab 3   • ondansetron (ZOFRAN) 4 MG Tab tablet TAKE 1 TABLET BY MOUTH EVERY 8 HOURS IFNEEDED FOR NAUSEA AND VOMITING **GENERICFOR ZOFRAN 12 Tab 11   • sumatriptan (IMITREX) 100 MG tablet  TAKE ONE TABLET BY MOUTH ONCE AS NEEDED FOR MIGRAINE FOR UP TO 1 DOSE 10 Tab 3   • hydrocortisone 2.5 % Cream topical cream Apply 1 Application to affected area(s) 2 times a day. 1 Tube 0   • metoclopramide (REGLAN) 10 MG Tab TAKE ONE TABLET BY MOUTH THREE TIMES A DAY BEFORE FOOD 60 Tab 1   • fluconazole (DIFLUCAN) 150 MG tablet Take 1 Tab by mouth every day. 2 Tab 0   • St Johns Wort 300 MG Cap Take 1 Cap by mouth 2 Times a Day.     • 5-Hydroxytryptophan (5-HTP PO) Take 200 mg by mouth every day.     • sulfacetamide (SULAMYD) 10 % Ointment 1 application daily 1 Tube 0   • fluticasone (FLONASE) 50 MCG/ACT nasal spray Spray 2 Sprays in nose every day. Each Nostril 3 Bottle 3   • levonorgestrel (MIRENA) 20 MCG/24HR IUD 1 Each by Intrauterine route Once.     • Probiotic Product (PROBIOTIC PO) Take 1 Tab by mouth 3 times a day.     • Ferrous Fumarate 325 (106 FE) MG TABS Take  by mouth 3 times a day.     • Zinc 50 MG CAPS Take  by mouth.     • MULTI VIT/FL PO Take  by mouth.     • CALCIUM CITRATE 250 MG PO TABS Take 8 Each by mouth.     • VITAMIN D3 441706 UNIT/GM POWD 50,000 Each by Does not apply route 3 times a day.     • VITAMIN A 25,000 Int'l Units by Does not apply route.     • VITAMIN C 500 MG PO TABS Take 1,000 mg by mouth 2 Times a Day.     • GLUCOSAMINE CHONDRO COMPLEX PO Take  by mouth 2 Times a Day.     • NAPROSYN 250 MG PO TABS Take 250 mg by mouth Once.       No current facility-administered medications for this visit.          ROS:  Review of Systems   Constitutional: Negative for fever, chills, weight loss and malaise/fatigue.   HENT: Negative for ear pain, nosebleeds, congestion, sore throat and neck pain.    Eyes: Negative for blurred vision.   Respiratory: Negative for cough, sputum production, shortness of breath and wheezing.    Cardiovascular: Negative for chest pain, palpitations,  and leg swelling.   Gastrointestinal: Negative for heartburn, nausea, vomiting, diarrhea and abdominal pain.  "  Genitourinary: Negative for dysuria, urgency and frequency.   Musculoskeletal: Negative for myalgias, back pain and joint pain.   Skin: Negative for rash and itching.   Neurological: Negative for dizziness, tingling, tremors, sensory change, focal weakness and headaches.   Endo/Heme/Allergies: Does not bruise/bleed easily.   Psychiatric/Behavioral: Negative for depression, anxiety, suicidal ideas, insomnia and memory loss.      Exam:  BP (!) 98/58   Pulse 67   Temp 36.8 °C (98.3 °F)   Resp 12   Ht 1.6 m (5' 3\")   Wt 69.4 kg (153 lb)     General:  Well nourished, well developed in NAD  Head is grossly normal.  Neck: Supple without JVD, Trachea midline, no thyromegaly noted  Pulmonary:  Normal effort. Speaking in full sentences  Psych: affect appropriate  Skin: no Jaundice noted or facial rashes    Please note that this dictation was created using voice recognition software. I have made every reasonable attempt to correct obvious errors, but I expect that there are errors of grammar and possibly content that I did not discover before finalizing the note.    Assessment/Plan:  1. Migraine without status migrainosus, not intractable, unspecified migraine type  - topiramate (TOPAMAX) 25 MG Tab; TAKE 1 TABLET BY MOUTH TWICE DAILY FOR     MIGRAINE HEADACHE -GENERIC FOR TOPAMAX  Dispense: 180 Tab; Refill: 3    2. Neck pain, musculoskeletal  - metaxalone (SKELAXIN) 800 MG Tab; Take 1 Tab by mouth 3 times a day.  Dispense: 90 Tab; Refill: 3    3. Reactive depression  - ALPRAZolam (XANAX) 0.25 MG Tab; Take 1 Tab by mouth at bedtime as needed for Sleep or Anxiety for up to 90 days. Indications: Feeling Anxious, Trouble Sleeping  Dispense: 90 Tab; Refill: 0    4. Nausea and vomiting  - ondansetron (ZOFRAN ODT) 8 MG TABLET DISPERSIBLE; Take 1 Tab by mouth every 8 hours as needed.  Dispense: 45 Tab; Refill: 3    5. Subacute sinusitis, unspecified location  - fluconazole (DIFLUCAN) 150 MG tablet; Take 1 Tab by mouth every " day. May repeat in 2 days  Dispense: 2 Tab; Refill: 0    6. Acquired hypothyroidism  - SYNTHROID 88 MCG Tab; Take 1 Tab by mouth Every morning on an empty stomach. TAKE 1 TABLET BY MOUTH DAILY  Dispense: 90 Tab; Refill: 3    7. Screening for cardiovascular condition    - Comp Metabolic Panel; Future  - Lipid Profile; Future    8. Screening for deficiency anemia  - CBC WITH DIFFERENTIAL; Future  - COPPER, SERUM; Future  - CERULOPLASMIN; Future  - FERRITIN; Future  - IRON/TOTAL IRON BIND; Future  - VITAMIN D,25 HYDROXY; Future  - FOLATE; Future  - MAGNESIUM; Future  - PTH INTACT W/CA  - VITAMIN B3 NIACIN; Future  - SELENIUM, SERUM; Future  - VITAMIN B1; Future  - TSH; Future  - FREE THYROXINE; Future  - TRIIDOTHYRONINE; Future  - VITAMIN B6; Future  - VITAMIN A; Future  - VITAMIN B12; Future  - ZINC SERUM; Future  - PHOSPHORUS; Future    9. Bariatric surgery status  - CBC WITH DIFFERENTIAL; Future  - Comp Metabolic Panel; Future  - Lipid Profile; Future  - COPPER, SERUM; Future  - CERULOPLASMIN; Future  - FERRITIN; Future  - IRON/TOTAL IRON BIND; Future  - VITAMIN D,25 HYDROXY; Future  - FOLATE; Future  - MAGNESIUM; Future  - PTH INTACT W/CA  - VITAMIN B3 NIACIN; Future  - SELENIUM, SERUM; Future  - VITAMIN B1; Future  - TSH; Future  - FREE THYROXINE; Future  - TRIIDOTHYRONINE; Future  - VITAMIN B6; Future  - VITAMIN A; Future  - VITAMIN B12; Future  - ZINC SERUM; Future  - PHOSPHORUS; Future    10. Encounter for vitamin deficiency screening  - COPPER, SERUM; Future  - CERULOPLASMIN; Future  - FERRITIN; Future  - IRON/TOTAL IRON BIND; Future  - VITAMIN D,25 HYDROXY; Future  - FOLATE; Future  - MAGNESIUM; Future  - PTH INTACT W/CA  - VITAMIN B3 NIACIN; Future  - SELENIUM, SERUM; Future  - VITAMIN B1; Future  - TSH; Future  - FREE THYROXINE; Future  - TRIIDOTHYRONINE; Future  - VITAMIN B6; Future  - VITAMIN A; Future  - VITAMIN B12; Future  - ZINC SERUM; Future  - PHOSPHORUS; Future    11. Abnormal CBC  - CBC WITH  DIFFERENTIAL; Future    Follow up on labs and recovery of sinus infection.

## 2020-09-29 ENCOUNTER — IMMUNIZATION (OUTPATIENT)
Dept: OCCUPATIONAL MEDICINE | Facility: CLINIC | Age: 50
End: 2020-09-29

## 2020-09-29 DIAGNOSIS — Z23 NEED FOR VACCINATION: ICD-10-CM

## 2020-10-02 PROCEDURE — 90686 IIV4 VACC NO PRSV 0.5 ML IM: CPT | Performed by: NURSE PRACTITIONER

## 2020-10-21 ENCOUNTER — HOSPITAL ENCOUNTER (OUTPATIENT)
Dept: LAB | Facility: MEDICAL CENTER | Age: 50
End: 2020-10-21
Attending: FAMILY MEDICINE
Payer: COMMERCIAL

## 2020-10-21 DIAGNOSIS — Z13.21 ENCOUNTER FOR VITAMIN DEFICIENCY SCREENING: ICD-10-CM

## 2020-10-21 DIAGNOSIS — Z13.0 SCREENING FOR DEFICIENCY ANEMIA: ICD-10-CM

## 2020-10-21 DIAGNOSIS — R79.89 ABNORMAL CBC: ICD-10-CM

## 2020-10-21 DIAGNOSIS — Z98.84 BARIATRIC SURGERY STATUS: ICD-10-CM

## 2020-10-21 DIAGNOSIS — E03.9 ACQUIRED HYPOTHYROIDISM: ICD-10-CM

## 2020-10-21 DIAGNOSIS — Z13.6 SCREENING FOR CARDIOVASCULAR CONDITION: ICD-10-CM

## 2020-10-21 DIAGNOSIS — F32.9 REACTIVE DEPRESSION: ICD-10-CM

## 2020-10-21 LAB
BASOPHILS # BLD AUTO: 0.6 % (ref 0–1.8)
BASOPHILS # BLD: 0.03 K/UL (ref 0–0.12)
EOSINOPHIL # BLD AUTO: 0.1 K/UL (ref 0–0.51)
EOSINOPHIL NFR BLD: 1.9 % (ref 0–6.9)
ERYTHROCYTE [DISTWIDTH] IN BLOOD BY AUTOMATED COUNT: 47.8 FL (ref 35.9–50)
HCT VFR BLD AUTO: 45.2 % (ref 37–47)
HGB BLD-MCNC: 14.6 G/DL (ref 12–16)
IMM GRANULOCYTES # BLD AUTO: 0.02 K/UL (ref 0–0.11)
IMM GRANULOCYTES NFR BLD AUTO: 0.4 % (ref 0–0.9)
LYMPHOCYTES # BLD AUTO: 1.27 K/UL (ref 1–4.8)
LYMPHOCYTES NFR BLD: 24.5 % (ref 22–41)
MCH RBC QN AUTO: 32.2 PG (ref 27–33)
MCHC RBC AUTO-ENTMCNC: 32.3 G/DL (ref 33.6–35)
MCV RBC AUTO: 99.8 FL (ref 81.4–97.8)
MONOCYTES # BLD AUTO: 0.39 K/UL (ref 0–0.85)
MONOCYTES NFR BLD AUTO: 7.5 % (ref 0–13.4)
NEUTROPHILS # BLD AUTO: 3.38 K/UL (ref 2–7.15)
NEUTROPHILS NFR BLD: 65.1 % (ref 44–72)
NRBC # BLD AUTO: 0 K/UL
NRBC BLD-RTO: 0 /100 WBC
PLATELET # BLD AUTO: 182 K/UL (ref 164–446)
PMV BLD AUTO: 11.7 FL (ref 9–12.9)
RBC # BLD AUTO: 4.53 M/UL (ref 4.2–5.4)
WBC # BLD AUTO: 5.2 K/UL (ref 4.8–10.8)

## 2020-10-21 PROCEDURE — 84207 ASSAY OF VITAMIN B-6: CPT

## 2020-10-21 PROCEDURE — 84630 ASSAY OF ZINC: CPT

## 2020-10-21 PROCEDURE — 84591 ASSAY OF NOS VITAMIN: CPT

## 2020-10-21 PROCEDURE — 80061 LIPID PANEL: CPT

## 2020-10-21 PROCEDURE — 82607 VITAMIN B-12: CPT

## 2020-10-21 PROCEDURE — 84255 ASSAY OF SELENIUM: CPT

## 2020-10-21 PROCEDURE — 80053 COMPREHEN METABOLIC PANEL: CPT

## 2020-10-21 PROCEDURE — 83540 ASSAY OF IRON: CPT

## 2020-10-21 PROCEDURE — 84425 ASSAY OF VITAMIN B-1: CPT

## 2020-10-21 PROCEDURE — 84443 ASSAY THYROID STIM HORMONE: CPT

## 2020-10-21 PROCEDURE — 82525 ASSAY OF COPPER: CPT

## 2020-10-21 PROCEDURE — 36415 COLL VENOUS BLD VENIPUNCTURE: CPT

## 2020-10-21 PROCEDURE — 84480 ASSAY TRIIODOTHYRONINE (T3): CPT

## 2020-10-21 PROCEDURE — 85025 COMPLETE CBC W/AUTO DIFF WBC: CPT

## 2020-10-21 PROCEDURE — 82390 ASSAY OF CERULOPLASMIN: CPT

## 2020-10-21 PROCEDURE — 82728 ASSAY OF FERRITIN: CPT

## 2020-10-21 PROCEDURE — 83735 ASSAY OF MAGNESIUM: CPT

## 2020-10-21 PROCEDURE — 82306 VITAMIN D 25 HYDROXY: CPT

## 2020-10-21 PROCEDURE — 84100 ASSAY OF PHOSPHORUS: CPT

## 2020-10-21 PROCEDURE — 84590 ASSAY OF VITAMIN A: CPT

## 2020-10-21 PROCEDURE — 82746 ASSAY OF FOLIC ACID SERUM: CPT

## 2020-10-21 PROCEDURE — 83550 IRON BINDING TEST: CPT

## 2020-10-21 PROCEDURE — 84439 ASSAY OF FREE THYROXINE: CPT

## 2020-10-22 LAB
25(OH)D3 SERPL-MCNC: 96 NG/ML (ref 30–100)
ALBUMIN SERPL BCP-MCNC: 3.9 G/DL (ref 3.2–4.9)
ALBUMIN/GLOB SERPL: 1.7 G/DL
ALP SERPL-CCNC: 66 U/L (ref 30–99)
ALT SERPL-CCNC: 34 U/L (ref 2–50)
ANION GAP SERPL CALC-SCNC: 6 MMOL/L (ref 7–16)
AST SERPL-CCNC: 28 U/L (ref 12–45)
BILIRUB SERPL-MCNC: 1.4 MG/DL (ref 0.1–1.5)
BUN SERPL-MCNC: 13 MG/DL (ref 8–22)
CALCIUM SERPL-MCNC: 9.1 MG/DL (ref 8.5–10.5)
CHLORIDE SERPL-SCNC: 106 MMOL/L (ref 96–112)
CHOLEST SERPL-MCNC: 114 MG/DL (ref 100–199)
CO2 SERPL-SCNC: 26 MMOL/L (ref 20–33)
CREAT SERPL-MCNC: 0.75 MG/DL (ref 0.5–1.4)
FERRITIN SERPL-MCNC: 243 NG/ML (ref 10–291)
FOLATE SERPL-MCNC: 12.9 NG/ML
GLOBULIN SER CALC-MCNC: 2.3 G/DL (ref 1.9–3.5)
GLUCOSE SERPL-MCNC: 78 MG/DL (ref 65–99)
HDLC SERPL-MCNC: 67 MG/DL
IRON SATN MFR SERPL: 53 % (ref 15–55)
IRON SERPL-MCNC: 117 UG/DL (ref 40–170)
LDLC SERPL CALC-MCNC: 32 MG/DL
MAGNESIUM SERPL-MCNC: 1.8 MG/DL (ref 1.5–2.5)
PHOSPHATE SERPL-MCNC: 3.6 MG/DL (ref 2.5–4.5)
POTASSIUM SERPL-SCNC: 3.7 MMOL/L (ref 3.6–5.5)
PROT SERPL-MCNC: 6.2 G/DL (ref 6–8.2)
SODIUM SERPL-SCNC: 138 MMOL/L (ref 135–145)
T3 SERPL-MCNC: 90.1 NG/DL (ref 60–181)
T4 FREE SERPL-MCNC: 1.36 NG/DL (ref 0.93–1.7)
TIBC SERPL-MCNC: 222 UG/DL (ref 250–450)
TRIGL SERPL-MCNC: 75 MG/DL (ref 0–149)
TSH SERPL DL<=0.005 MIU/L-ACNC: 3.25 UIU/ML (ref 0.38–5.33)
UIBC SERPL-MCNC: 105 UG/DL (ref 110–370)
VIT B12 SERPL-MCNC: 1386 PG/ML (ref 211–911)

## 2020-10-22 RX ORDER — LEVOTHYROXINE SODIUM 88 MCG
TABLET ORAL
Qty: 90 TAB | Refills: 3 | Status: SHIPPED | OUTPATIENT
Start: 2020-10-22 | End: 2022-12-05 | Stop reason: SDUPTHER

## 2020-10-22 NOTE — TELEPHONE ENCOUNTER
Dr Lopez- Controlled med in queue. Please refill as you see fit.  -Patient was last seen by PCP 2/20. Last TSH read 3.25 (10/20). Will refill for 12 months.

## 2020-10-23 LAB — CERULOPLASMIN SERPL-MCNC: 21 MG/DL (ref 17–54)

## 2020-10-23 RX ORDER — ALPRAZOLAM 0.25 MG/1
TABLET ORAL
Qty: 90 TAB | Refills: 0 | Status: SHIPPED | OUTPATIENT
Start: 2020-10-23 | End: 2021-01-21

## 2020-10-24 LAB
COPPER SERPL-MCNC: 99.4 UG/DL (ref 80–155)
SELENIUM SERPL-MCNC: 118.4 UG/L (ref 23–190)

## 2020-10-25 LAB
ANNOTATION COMMENT IMP: NORMAL
RETINYL PALMITATE SERPL-MCNC: 0.06 MG/L (ref 0–0.1)
VIT A SERPL-MCNC: 0.55 MG/L (ref 0.3–1.2)
VIT B1 BLD-MCNC: 182 NMOL/L (ref 70–180)
VIT B6 SERPL-MCNC: 114.8 NMOL/L (ref 20–125)

## 2020-10-27 LAB — ZINC SERPL-MCNC: 84.2 UG/DL (ref 60–120)

## 2020-10-29 ENCOUNTER — TELEPHONE (OUTPATIENT)
Dept: MEDICAL GROUP | Facility: PHYSICIAN GROUP | Age: 50
End: 2020-10-29

## 2020-10-30 NOTE — TELEPHONE ENCOUNTER
[facility] called and wanted the days supply of the Xanax.  [facility] was contacted and notified that it is a 90 day supply.

## 2020-11-02 LAB — NIACIN SERPL-MCNC: 3.73 UG/ML (ref 0.5–8.45)

## 2022-02-17 NOTE — TELEPHONE ENCOUNTER
Last seen by PCP 8/19. Will send 2 fills to pharmacy.     Opioid Pregnancy And Lactation Text: These medications can lead to premature delivery and should be avoided during pregnancy. These medications are also present in breast milk in small amounts.

## 2022-07-06 ENCOUNTER — PRE-ADMISSION TESTING (OUTPATIENT)
Dept: ADMISSIONS | Facility: MEDICAL CENTER | Age: 52
End: 2022-07-06
Attending: SURGERY
Payer: COMMERCIAL

## 2022-07-06 DIAGNOSIS — Z01.810 PRE-OPERATIVE CARDIOVASCULAR EXAMINATION: ICD-10-CM

## 2022-07-06 DIAGNOSIS — Z01.812 PRE-OPERATIVE LABORATORY EXAMINATION: ICD-10-CM

## 2022-07-06 RX ORDER — BUPROPION HYDROCHLORIDE 150 MG/1
150 TABLET ORAL EVERY MORNING
COMMUNITY
End: 2022-12-05 | Stop reason: SDUPTHER

## 2022-07-06 ASSESSMENT — FIBROSIS 4 INDEX: FIB4 SCORE: 1.29

## 2022-07-12 ENCOUNTER — ANESTHESIA EVENT (OUTPATIENT)
Dept: SURGERY | Facility: MEDICAL CENTER | Age: 52
End: 2022-07-12
Payer: COMMERCIAL

## 2022-07-12 ENCOUNTER — HOSPITAL ENCOUNTER (OUTPATIENT)
Facility: MEDICAL CENTER | Age: 52
End: 2022-07-13
Attending: SURGERY | Admitting: SURGERY
Payer: COMMERCIAL

## 2022-07-12 ENCOUNTER — ANESTHESIA (OUTPATIENT)
Dept: SURGERY | Facility: MEDICAL CENTER | Age: 52
End: 2022-07-12
Payer: COMMERCIAL

## 2022-07-12 DIAGNOSIS — G89.18 POSTOPERATIVE PAIN: ICD-10-CM

## 2022-07-12 PROBLEM — K56.1 INTUSSUSCEPTION OF SMALL BOWEL (HCC): Status: ACTIVE | Noted: 2022-07-12

## 2022-07-12 LAB
ALBUMIN SERPL BCP-MCNC: 3.8 G/DL (ref 3.2–4.9)
ALBUMIN/GLOB SERPL: 1.6 G/DL
ALP SERPL-CCNC: 57 U/L (ref 30–99)
ALT SERPL-CCNC: 24 U/L (ref 2–50)
ANION GAP SERPL CALC-SCNC: 14 MMOL/L (ref 7–16)
AST SERPL-CCNC: 24 U/L (ref 12–45)
BASOPHILS # BLD AUTO: 0.2 % (ref 0–1.8)
BASOPHILS # BLD: 0.02 K/UL (ref 0–0.12)
BILIRUB SERPL-MCNC: 1 MG/DL (ref 0.1–1.5)
BUN SERPL-MCNC: 10 MG/DL (ref 8–22)
CALCIUM SERPL-MCNC: 8.5 MG/DL (ref 8.5–10.5)
CHLORIDE SERPL-SCNC: 106 MMOL/L (ref 96–112)
CO2 SERPL-SCNC: 19 MMOL/L (ref 20–33)
CREAT SERPL-MCNC: 0.65 MG/DL (ref 0.5–1.4)
EOSINOPHIL # BLD AUTO: 0 K/UL (ref 0–0.51)
EOSINOPHIL NFR BLD: 0 % (ref 0–6.9)
ERYTHROCYTE [DISTWIDTH] IN BLOOD BY AUTOMATED COUNT: 48.2 FL (ref 35.9–50)
GFR SERPLBLD CREATININE-BSD FMLA CKD-EPI: 107 ML/MIN/1.73 M 2
GLOBULIN SER CALC-MCNC: 2.4 G/DL (ref 1.9–3.5)
GLUCOSE BLD STRIP.AUTO-MCNC: 69 MG/DL (ref 65–99)
GLUCOSE SERPL-MCNC: 113 MG/DL (ref 65–99)
HCG UR QL: NEGATIVE
HCT VFR BLD AUTO: 41.9 % (ref 37–47)
HGB BLD-MCNC: 14.2 G/DL (ref 12–16)
IMM GRANULOCYTES # BLD AUTO: 0.04 K/UL (ref 0–0.11)
IMM GRANULOCYTES NFR BLD AUTO: 0.4 % (ref 0–0.9)
LYMPHOCYTES # BLD AUTO: 0.39 K/UL (ref 1–4.8)
LYMPHOCYTES NFR BLD: 4.1 % (ref 22–41)
MCH RBC QN AUTO: 34.2 PG (ref 27–33)
MCHC RBC AUTO-ENTMCNC: 33.9 G/DL (ref 33.6–35)
MCV RBC AUTO: 101 FL (ref 81.4–97.8)
MONOCYTES # BLD AUTO: 0.15 K/UL (ref 0–0.85)
MONOCYTES NFR BLD AUTO: 1.6 % (ref 0–13.4)
NEUTROPHILS # BLD AUTO: 8.86 K/UL (ref 2–7.15)
NEUTROPHILS NFR BLD: 93.7 % (ref 44–72)
NRBC # BLD AUTO: 0 K/UL
NRBC BLD-RTO: 0 /100 WBC
PLATELET # BLD AUTO: 140 K/UL (ref 164–446)
PMV BLD AUTO: 10.6 FL (ref 9–12.9)
POTASSIUM SERPL-SCNC: 4.1 MMOL/L (ref 3.6–5.5)
PROT SERPL-MCNC: 6.2 G/DL (ref 6–8.2)
RBC # BLD AUTO: 4.15 M/UL (ref 4.2–5.4)
SODIUM SERPL-SCNC: 139 MMOL/L (ref 135–145)
WBC # BLD AUTO: 9.5 K/UL (ref 4.8–10.8)

## 2022-07-12 PROCEDURE — G0378 HOSPITAL OBSERVATION PER HR: HCPCS

## 2022-07-12 PROCEDURE — 160041 HCHG SURGERY MINUTES - EA ADDL 1 MIN LEVEL 4: Performed by: SURGERY

## 2022-07-12 PROCEDURE — 700111 HCHG RX REV CODE 636 W/ 250 OVERRIDE (IP): Performed by: ANESTHESIOLOGY

## 2022-07-12 PROCEDURE — 160009 HCHG ANES TIME/MIN: Performed by: SURGERY

## 2022-07-12 PROCEDURE — 00840 ANES IPER PX LOWER ABD NOS: CPT | Performed by: ANESTHESIOLOGY

## 2022-07-12 PROCEDURE — 80053 COMPREHEN METABOLIC PANEL: CPT

## 2022-07-12 PROCEDURE — A9270 NON-COVERED ITEM OR SERVICE: HCPCS | Performed by: ANESTHESIOLOGY

## 2022-07-12 PROCEDURE — 82962 GLUCOSE BLOOD TEST: CPT

## 2022-07-12 PROCEDURE — 700105 HCHG RX REV CODE 258: Performed by: ANESTHESIOLOGY

## 2022-07-12 PROCEDURE — 700102 HCHG RX REV CODE 250 W/ 637 OVERRIDE(OP): Performed by: ANESTHESIOLOGY

## 2022-07-12 PROCEDURE — 160002 HCHG RECOVERY MINUTES (STAT): Performed by: SURGERY

## 2022-07-12 PROCEDURE — 85025 COMPLETE CBC W/AUTO DIFF WBC: CPT

## 2022-07-12 PROCEDURE — 96374 THER/PROPH/DIAG INJ IV PUSH: CPT

## 2022-07-12 PROCEDURE — 160048 HCHG OR STATISTICAL LEVEL 1-5: Performed by: SURGERY

## 2022-07-12 PROCEDURE — 36415 COLL VENOUS BLD VENIPUNCTURE: CPT

## 2022-07-12 PROCEDURE — 160035 HCHG PACU - 1ST 60 MINS PHASE I: Performed by: SURGERY

## 2022-07-12 PROCEDURE — 160029 HCHG SURGERY MINUTES - 1ST 30 MINS LEVEL 4: Performed by: SURGERY

## 2022-07-12 PROCEDURE — 88307 TISSUE EXAM BY PATHOLOGIST: CPT

## 2022-07-12 PROCEDURE — 81025 URINE PREGNANCY TEST: CPT

## 2022-07-12 PROCEDURE — 700101 HCHG RX REV CODE 250: Performed by: ANESTHESIOLOGY

## 2022-07-12 PROCEDURE — 700101 HCHG RX REV CODE 250: Performed by: SURGERY

## 2022-07-12 PROCEDURE — 160036 HCHG PACU - EA ADDL 30 MINS PHASE I: Performed by: SURGERY

## 2022-07-12 PROCEDURE — 700111 HCHG RX REV CODE 636 W/ 250 OVERRIDE (IP): Performed by: PHYSICIAN ASSISTANT

## 2022-07-12 PROCEDURE — 700105 HCHG RX REV CODE 258: Performed by: SURGERY

## 2022-07-12 PROCEDURE — 700105 HCHG RX REV CODE 258: Performed by: PHYSICIAN ASSISTANT

## 2022-07-12 RX ORDER — LEVOTHYROXINE SODIUM 88 UG/1
88 TABLET ORAL
Status: DISCONTINUED | OUTPATIENT
Start: 2022-07-13 | End: 2022-07-13 | Stop reason: HOSPADM

## 2022-07-12 RX ORDER — MEPERIDINE HYDROCHLORIDE 25 MG/ML
12.5 INJECTION INTRAMUSCULAR; INTRAVENOUS; SUBCUTANEOUS
Status: DISCONTINUED | OUTPATIENT
Start: 2022-07-12 | End: 2022-07-12 | Stop reason: HOSPADM

## 2022-07-12 RX ORDER — DEXAMETHASONE SODIUM PHOSPHATE 4 MG/ML
INJECTION, SOLUTION INTRA-ARTICULAR; INTRALESIONAL; INTRAMUSCULAR; INTRAVENOUS; SOFT TISSUE PRN
Status: DISCONTINUED | OUTPATIENT
Start: 2022-07-12 | End: 2022-07-12 | Stop reason: SURG

## 2022-07-12 RX ORDER — HYDROMORPHONE HYDROCHLORIDE 1 MG/ML
0.2 INJECTION, SOLUTION INTRAMUSCULAR; INTRAVENOUS; SUBCUTANEOUS
Status: DISCONTINUED | OUTPATIENT
Start: 2022-07-12 | End: 2022-07-12 | Stop reason: HOSPADM

## 2022-07-12 RX ORDER — ENOXAPARIN SODIUM 100 MG/ML
40 INJECTION SUBCUTANEOUS EVERY EVENING
Status: DISCONTINUED | OUTPATIENT
Start: 2022-07-13 | End: 2022-07-13 | Stop reason: HOSPADM

## 2022-07-12 RX ORDER — ONDANSETRON 2 MG/ML
4 INJECTION INTRAMUSCULAR; INTRAVENOUS ONCE
Status: DISCONTINUED | OUTPATIENT
Start: 2022-07-12 | End: 2022-07-12 | Stop reason: HOSPADM

## 2022-07-12 RX ORDER — ACETAMINOPHEN 10 MG/ML
1000 INJECTION, SOLUTION INTRAVENOUS EVERY 6 HOURS
Status: COMPLETED | OUTPATIENT
Start: 2022-07-12 | End: 2022-07-13

## 2022-07-12 RX ORDER — ONDANSETRON 4 MG/1
4 TABLET, ORALLY DISINTEGRATING ORAL EVERY 4 HOURS PRN
Status: DISCONTINUED | OUTPATIENT
Start: 2022-07-12 | End: 2022-07-13 | Stop reason: HOSPADM

## 2022-07-12 RX ORDER — HALOPERIDOL 5 MG/ML
1 INJECTION INTRAMUSCULAR
Status: DISCONTINUED | OUTPATIENT
Start: 2022-07-12 | End: 2022-07-12 | Stop reason: HOSPADM

## 2022-07-12 RX ORDER — OXYCODONE HCL 5 MG/5 ML
5 SOLUTION, ORAL ORAL
Status: COMPLETED | OUTPATIENT
Start: 2022-07-12 | End: 2022-07-12

## 2022-07-12 RX ORDER — ENALAPRILAT 1.25 MG/ML
2.5 INJECTION INTRAVENOUS EVERY 6 HOURS PRN
Status: DISCONTINUED | OUTPATIENT
Start: 2022-07-12 | End: 2022-07-13 | Stop reason: HOSPADM

## 2022-07-12 RX ORDER — DIPHENHYDRAMINE HYDROCHLORIDE 50 MG/ML
12.5 INJECTION INTRAMUSCULAR; INTRAVENOUS
Status: DISCONTINUED | OUTPATIENT
Start: 2022-07-12 | End: 2022-07-12 | Stop reason: HOSPADM

## 2022-07-12 RX ORDER — CEFAZOLIN SODIUM 1 G/3ML
INJECTION, POWDER, FOR SOLUTION INTRAMUSCULAR; INTRAVENOUS PRN
Status: DISCONTINUED | OUTPATIENT
Start: 2022-07-12 | End: 2022-07-12 | Stop reason: SURG

## 2022-07-12 RX ORDER — DIPHENHYDRAMINE HYDROCHLORIDE 50 MG/ML
12.5 INJECTION INTRAMUSCULAR; INTRAVENOUS EVERY 6 HOURS PRN
Status: DISCONTINUED | OUTPATIENT
Start: 2022-07-12 | End: 2022-07-13 | Stop reason: HOSPADM

## 2022-07-12 RX ORDER — HYDROMORPHONE HYDROCHLORIDE 1 MG/ML
0.4 INJECTION, SOLUTION INTRAMUSCULAR; INTRAVENOUS; SUBCUTANEOUS
Status: DISCONTINUED | OUTPATIENT
Start: 2022-07-12 | End: 2022-07-12 | Stop reason: HOSPADM

## 2022-07-12 RX ORDER — SODIUM CHLORIDE, SODIUM LACTATE, POTASSIUM CHLORIDE, CALCIUM CHLORIDE 600; 310; 30; 20 MG/100ML; MG/100ML; MG/100ML; MG/100ML
INJECTION, SOLUTION INTRAVENOUS CONTINUOUS
Status: ACTIVE | OUTPATIENT
Start: 2022-07-12 | End: 2022-07-12

## 2022-07-12 RX ORDER — OXYCODONE HCL 5 MG/5 ML
10 SOLUTION, ORAL ORAL
Status: COMPLETED | OUTPATIENT
Start: 2022-07-12 | End: 2022-07-12

## 2022-07-12 RX ORDER — PROMETHAZINE HYDROCHLORIDE 25 MG/1
25 SUPPOSITORY RECTAL EVERY 4 HOURS PRN
Status: DISCONTINUED | OUTPATIENT
Start: 2022-07-12 | End: 2022-07-13 | Stop reason: HOSPADM

## 2022-07-12 RX ORDER — SIMETHICONE 125 MG
125 TABLET,CHEWABLE ORAL 3 TIMES DAILY PRN
Status: DISCONTINUED | OUTPATIENT
Start: 2022-07-12 | End: 2022-07-13 | Stop reason: HOSPADM

## 2022-07-12 RX ORDER — KETOROLAC TROMETHAMINE 30 MG/ML
INJECTION, SOLUTION INTRAMUSCULAR; INTRAVENOUS PRN
Status: DISCONTINUED | OUTPATIENT
Start: 2022-07-12 | End: 2022-07-12 | Stop reason: SURG

## 2022-07-12 RX ORDER — HYDROMORPHONE HYDROCHLORIDE 1 MG/ML
0.1 INJECTION, SOLUTION INTRAMUSCULAR; INTRAVENOUS; SUBCUTANEOUS
Status: DISCONTINUED | OUTPATIENT
Start: 2022-07-12 | End: 2022-07-12 | Stop reason: HOSPADM

## 2022-07-12 RX ORDER — HYDROCODONE BITARTRATE AND ACETAMINOPHEN 5; 325 MG/1; MG/1
1-2 TABLET ORAL EVERY 4 HOURS PRN
Status: DISCONTINUED | OUTPATIENT
Start: 2022-07-12 | End: 2022-07-13 | Stop reason: HOSPADM

## 2022-07-12 RX ORDER — SODIUM CHLORIDE, SODIUM LACTATE, POTASSIUM CHLORIDE, CALCIUM CHLORIDE 600; 310; 30; 20 MG/100ML; MG/100ML; MG/100ML; MG/100ML
INJECTION, SOLUTION INTRAVENOUS CONTINUOUS
Status: DISCONTINUED | OUTPATIENT
Start: 2022-07-12 | End: 2022-07-13 | Stop reason: HOSPADM

## 2022-07-12 RX ORDER — FLUTICASONE PROPIONATE 50 MCG
2 SPRAY, SUSPENSION (ML) NASAL DAILY
Status: DISCONTINUED | OUTPATIENT
Start: 2022-07-13 | End: 2022-07-13 | Stop reason: HOSPADM

## 2022-07-12 RX ORDER — ONDANSETRON 2 MG/ML
4 INJECTION INTRAMUSCULAR; INTRAVENOUS EVERY 4 HOURS PRN
Status: DISCONTINUED | OUTPATIENT
Start: 2022-07-12 | End: 2022-07-13 | Stop reason: HOSPADM

## 2022-07-12 RX ORDER — SODIUM CHLORIDE, SODIUM LACTATE, POTASSIUM CHLORIDE, AND CALCIUM CHLORIDE .6; .31; .03; .02 G/100ML; G/100ML; G/100ML; G/100ML
500 INJECTION, SOLUTION INTRAVENOUS
Status: DISCONTINUED | OUTPATIENT
Start: 2022-07-12 | End: 2022-07-13 | Stop reason: HOSPADM

## 2022-07-12 RX ORDER — MORPHINE SULFATE 4 MG/ML
1-5 INJECTION INTRAVENOUS
Status: DISCONTINUED | OUTPATIENT
Start: 2022-07-12 | End: 2022-07-13 | Stop reason: HOSPADM

## 2022-07-12 RX ORDER — SODIUM CHLORIDE, SODIUM LACTATE, POTASSIUM CHLORIDE, CALCIUM CHLORIDE 600; 310; 30; 20 MG/100ML; MG/100ML; MG/100ML; MG/100ML
INJECTION, SOLUTION INTRAVENOUS CONTINUOUS
Status: DISCONTINUED | OUTPATIENT
Start: 2022-07-12 | End: 2022-07-12 | Stop reason: HOSPADM

## 2022-07-12 RX ORDER — ONDANSETRON 2 MG/ML
INJECTION INTRAMUSCULAR; INTRAVENOUS PRN
Status: DISCONTINUED | OUTPATIENT
Start: 2022-07-12 | End: 2022-07-12 | Stop reason: SURG

## 2022-07-12 RX ORDER — ENOXAPARIN SODIUM 100 MG/ML
40 INJECTION SUBCUTANEOUS DAILY
Status: DISCONTINUED | OUTPATIENT
Start: 2022-07-13 | End: 2022-07-12

## 2022-07-12 RX ORDER — HYDRALAZINE HYDROCHLORIDE 20 MG/ML
10 INJECTION INTRAMUSCULAR; INTRAVENOUS EVERY 6 HOURS PRN
Status: DISCONTINUED | OUTPATIENT
Start: 2022-07-12 | End: 2022-07-13 | Stop reason: HOSPADM

## 2022-07-12 RX ORDER — SODIUM CHLORIDE, SODIUM LACTATE, POTASSIUM CHLORIDE, CALCIUM CHLORIDE 600; 310; 30; 20 MG/100ML; MG/100ML; MG/100ML; MG/100ML
INJECTION, SOLUTION INTRAVENOUS
Status: DISCONTINUED | OUTPATIENT
Start: 2022-07-12 | End: 2022-07-12 | Stop reason: SURG

## 2022-07-12 RX ORDER — BUPIVACAINE HYDROCHLORIDE AND EPINEPHRINE 5; 5 MG/ML; UG/ML
INJECTION, SOLUTION EPIDURAL; INTRACAUDAL; PERINEURAL
Status: DISCONTINUED | OUTPATIENT
Start: 2022-07-12 | End: 2022-07-12 | Stop reason: HOSPADM

## 2022-07-12 RX ORDER — TOPIRAMATE 25 MG/1
25 TABLET ORAL 2 TIMES DAILY
Status: DISCONTINUED | OUTPATIENT
Start: 2022-07-12 | End: 2022-07-13 | Stop reason: HOSPADM

## 2022-07-12 RX ORDER — KETOROLAC TROMETHAMINE 30 MG/ML
30 INJECTION, SOLUTION INTRAMUSCULAR; INTRAVENOUS EVERY 6 HOURS PRN
Status: DISCONTINUED | OUTPATIENT
Start: 2022-07-12 | End: 2022-07-13 | Stop reason: HOSPADM

## 2022-07-12 RX ADMIN — OXYCODONE HYDROCHLORIDE 5 MG: 5 SOLUTION ORAL at 17:56

## 2022-07-12 RX ADMIN — SODIUM CHLORIDE, POTASSIUM CHLORIDE, SODIUM LACTATE AND CALCIUM CHLORIDE: 600; 310; 30; 20 INJECTION, SOLUTION INTRAVENOUS at 15:51

## 2022-07-12 RX ADMIN — FENTANYL CITRATE 50 MCG: 50 INJECTION, SOLUTION INTRAMUSCULAR; INTRAVENOUS at 17:04

## 2022-07-12 RX ADMIN — SODIUM CHLORIDE, POTASSIUM CHLORIDE, SODIUM LACTATE AND CALCIUM CHLORIDE: 600; 310; 30; 20 INJECTION, SOLUTION INTRAVENOUS at 13:24

## 2022-07-12 RX ADMIN — KETOROLAC TROMETHAMINE 30 MG: 30 INJECTION, SOLUTION INTRAMUSCULAR at 17:04

## 2022-07-12 RX ADMIN — FENTANYL CITRATE 100 MCG: 50 INJECTION, SOLUTION INTRAMUSCULAR; INTRAVENOUS at 16:15

## 2022-07-12 RX ADMIN — ONDANSETRON 4 MG: 4 TABLET, ORALLY DISINTEGRATING ORAL at 20:40

## 2022-07-12 RX ADMIN — DEXAMETHASONE SODIUM PHOSPHATE 4 MG: 4 INJECTION, SOLUTION INTRA-ARTICULAR; INTRALESIONAL; INTRAMUSCULAR; INTRAVENOUS; SOFT TISSUE at 16:21

## 2022-07-12 RX ADMIN — CEFAZOLIN 2 G: 330 INJECTION, POWDER, FOR SOLUTION INTRAMUSCULAR; INTRAVENOUS at 15:52

## 2022-07-12 RX ADMIN — SUGAMMADEX 400 MG: 100 INJECTION, SOLUTION INTRAVENOUS at 17:04

## 2022-07-12 RX ADMIN — PROPOFOL 200 MG: 10 INJECTION, EMULSION INTRAVENOUS at 15:53

## 2022-07-12 RX ADMIN — OXYCODONE HYDROCHLORIDE 5 MG: 5 SOLUTION ORAL at 19:36

## 2022-07-12 RX ADMIN — SODIUM CHLORIDE, POTASSIUM CHLORIDE, SODIUM LACTATE AND CALCIUM CHLORIDE: 600; 310; 30; 20 INJECTION, SOLUTION INTRAVENOUS at 20:13

## 2022-07-12 RX ADMIN — ROCURONIUM BROMIDE 50 MG: 10 INJECTION, SOLUTION INTRAVENOUS at 15:58

## 2022-07-12 RX ADMIN — METHOCARBAMOL 1000 MG: 100 INJECTION INTRAMUSCULAR; INTRAVENOUS at 18:06

## 2022-07-12 RX ADMIN — FENTANYL CITRATE 25 MCG: 50 INJECTION, SOLUTION INTRAMUSCULAR; INTRAVENOUS at 17:56

## 2022-07-12 RX ADMIN — FENTANYL CITRATE 50 MCG: 50 INJECTION, SOLUTION INTRAMUSCULAR; INTRAVENOUS at 15:58

## 2022-07-12 RX ADMIN — FENTANYL CITRATE 25 MCG: 50 INJECTION, SOLUTION INTRAMUSCULAR; INTRAVENOUS at 19:35

## 2022-07-12 RX ADMIN — FENTANYL CITRATE 50 MCG: 50 INJECTION, SOLUTION INTRAMUSCULAR; INTRAVENOUS at 17:00

## 2022-07-12 RX ADMIN — Medication 100 MG: at 15:54

## 2022-07-12 RX ADMIN — ACETAMINOPHEN 1000 MG: 10 INJECTION, SOLUTION INTRAVENOUS at 20:57

## 2022-07-12 RX ADMIN — ONDANSETRON 4 MG: 2 INJECTION INTRAMUSCULAR; INTRAVENOUS at 16:21

## 2022-07-12 RX ADMIN — FENTANYL CITRATE 25 MCG: 50 INJECTION, SOLUTION INTRAMUSCULAR; INTRAVENOUS at 18:41

## 2022-07-12 ASSESSMENT — PAIN SCALES - GENERAL: PAIN_LEVEL: 0

## 2022-07-12 ASSESSMENT — PAIN DESCRIPTION - PAIN TYPE
TYPE: SURGICAL PAIN

## 2022-07-12 ASSESSMENT — FIBROSIS 4 INDEX: FIB4 SCORE: 1.29

## 2022-07-12 NOTE — ANESTHESIA PROCEDURE NOTES
Airway    Date/Time: 7/12/2022 3:58 PM  Performed by: Nikita Martin M.D.  Authorized by: Nikita Martin M.D.     Location:  OR  Urgency:  Elective  Indications for Airway Management:  Anesthesia      Spontaneous Ventilation: absent    Sedation Level:  Deep  Preoxygenated: Yes    Patient Position:  Sniffing  Final Airway Type:  Endotracheal airway  Final Endotracheal Airway:  ETT  Cuffed: Yes    Technique Used for Successful ETT Placement:  Direct laryngoscopy    Insertion Site:  Oral  Blade Type:  Cadet  Laryngoscope Blade/Videolaryngoscope Blade Size:  2  ETT Size (mm):  7.5  Measured from:  Teeth  ETT to Teeth (cm):  22  Placement Verified by: auscultation and capnometry    Cormack-Lehane Classification:  Grade I - full view of glottis  Number of Attempts at Approach:  1

## 2022-07-13 VITALS
OXYGEN SATURATION: 95 % | RESPIRATION RATE: 16 BRPM | DIASTOLIC BLOOD PRESSURE: 55 MMHG | TEMPERATURE: 98.4 F | BODY MASS INDEX: 28.36 KG/M2 | SYSTOLIC BLOOD PRESSURE: 86 MMHG | HEART RATE: 73 BPM | WEIGHT: 160.05 LBS | HEIGHT: 63 IN

## 2022-07-13 LAB — PATHOLOGY CONSULT NOTE: NORMAL

## 2022-07-13 PROCEDURE — G0378 HOSPITAL OBSERVATION PER HR: HCPCS

## 2022-07-13 PROCEDURE — 700111 HCHG RX REV CODE 636 W/ 250 OVERRIDE (IP): Performed by: PHYSICIAN ASSISTANT

## 2022-07-13 PROCEDURE — 96376 TX/PRO/DX INJ SAME DRUG ADON: CPT

## 2022-07-13 PROCEDURE — 96375 TX/PRO/DX INJ NEW DRUG ADDON: CPT

## 2022-07-13 PROCEDURE — 700105 HCHG RX REV CODE 258: Performed by: PHYSICIAN ASSISTANT

## 2022-07-13 PROCEDURE — A9270 NON-COVERED ITEM OR SERVICE: HCPCS | Performed by: PHYSICIAN ASSISTANT

## 2022-07-13 PROCEDURE — 700102 HCHG RX REV CODE 250 W/ 637 OVERRIDE(OP): Performed by: PHYSICIAN ASSISTANT

## 2022-07-13 RX ORDER — TRAMADOL HYDROCHLORIDE 50 MG/1
50 TABLET ORAL EVERY 6 HOURS PRN
Qty: 10 TABLET | Refills: 0 | Status: SHIPPED | OUTPATIENT
Start: 2022-07-13 | End: 2022-07-16

## 2022-07-13 RX ORDER — HYDROCODONE BITARTRATE AND ACETAMINOPHEN 5; 325 MG/1; MG/1
1 TABLET ORAL EVERY 6 HOURS PRN
Qty: 10 TABLET | Refills: 0 | Status: SHIPPED | OUTPATIENT
Start: 2022-07-13 | End: 2022-07-16

## 2022-07-13 RX ADMIN — FAMOTIDINE 20 MG: 10 INJECTION, SOLUTION INTRAVENOUS at 05:28

## 2022-07-13 RX ADMIN — SODIUM CHLORIDE, POTASSIUM CHLORIDE, SODIUM LACTATE AND CALCIUM CHLORIDE: 600; 310; 30; 20 INJECTION, SOLUTION INTRAVENOUS at 03:12

## 2022-07-13 RX ADMIN — ACETAMINOPHEN 1000 MG: 10 INJECTION, SOLUTION INTRAVENOUS at 03:12

## 2022-07-13 RX ADMIN — TOPIRAMATE 25 MG: 25 TABLET, FILM COATED ORAL at 05:28

## 2022-07-13 RX ADMIN — KETOROLAC TROMETHAMINE 30 MG: 30 INJECTION, SOLUTION INTRAMUSCULAR; INTRAVENOUS at 06:11

## 2022-07-13 RX ADMIN — KETOROLAC TROMETHAMINE 30 MG: 30 INJECTION, SOLUTION INTRAMUSCULAR; INTRAVENOUS at 00:08

## 2022-07-13 RX ADMIN — LEVOTHYROXINE SODIUM 88 MCG: 0.09 TABLET ORAL at 05:28

## 2022-07-13 ASSESSMENT — LIFESTYLE VARIABLES
ALCOHOL_USE: YES
CONSUMPTION TOTAL: NEGATIVE
TOTAL SCORE: 0
HOW MANY TIMES IN THE PAST YEAR HAVE YOU HAD 5 OR MORE DRINKS IN A DAY: 0
HAVE YOU EVER FELT YOU SHOULD CUT DOWN ON YOUR DRINKING: NO
ON A TYPICAL DAY WHEN YOU DRINK ALCOHOL HOW MANY DRINKS DO YOU HAVE: 2
HAVE PEOPLE ANNOYED YOU BY CRITICIZING YOUR DRINKING: NO
TOTAL SCORE: 0
EVER FELT BAD OR GUILTY ABOUT YOUR DRINKING: NO
EVER HAD A DRINK FIRST THING IN THE MORNING TO STEADY YOUR NERVES TO GET RID OF A HANGOVER: NO
AVERAGE NUMBER OF DAYS PER WEEK YOU HAVE A DRINK CONTAINING ALCOHOL: 2
TOTAL SCORE: 0

## 2022-07-13 ASSESSMENT — COGNITIVE AND FUNCTIONAL STATUS - GENERAL
MOBILITY SCORE: 24
SUGGESTED CMS G CODE MODIFIER DAILY ACTIVITY: CH
SUGGESTED CMS G CODE MODIFIER MOBILITY: CH
DAILY ACTIVITIY SCORE: 24

## 2022-07-13 ASSESSMENT — PAIN DESCRIPTION - PAIN TYPE
TYPE: SURGICAL PAIN

## 2022-07-13 ASSESSMENT — PATIENT HEALTH QUESTIONNAIRE - PHQ9
1. LITTLE INTEREST OR PLEASURE IN DOING THINGS: NOT AT ALL
SUM OF ALL RESPONSES TO PHQ9 QUESTIONS 1 AND 2: 0
2. FEELING DOWN, DEPRESSED, IRRITABLE, OR HOPELESS: NOT AT ALL

## 2022-07-13 NOTE — ANESTHESIA POSTPROCEDURE EVALUATION
Patient: Regina Schmidt    Procedure Summary     Date: 07/12/22 Room / Location: [facility] 09 / SURGERY [facility]    Anesthesia Start: 1551 Anesthesia Stop: 1719    Procedure: LAPAROSCOPIC SMALL BOWEL RESECTION WITH ANASTAMOSIS (N/A Abdomen) Diagnosis: (Small Bowel Resection)    Surgeons: John H Ganser, M.D. Responsible Provider: Nikita Martin M.D.    Anesthesia Type: general ASA Status: 3          Final Anesthesia Type: general  Last vitals  BP   Blood Pressure: 112/71    Temp   36.5 °C (97.7 °F)    Pulse   79   Resp   18    SpO2   95 %      Anesthesia Post Evaluation    Patient location during evaluation: PACU  Patient participation: complete - patient participated  Level of consciousness: awake and alert  Pain score: 0    Airway patency: patent  Anesthetic complications: no  Cardiovascular status: adequate and hemodynamically stable  Respiratory status: acceptable  Hydration status: acceptable    PONV: none          No complications documented.     Nurse Pain Score: 0 (NPRS)

## 2022-07-13 NOTE — CARE PLAN
The patient is Stable - Low risk of patient condition declining or worsening    Shift Goals  Clinical Goals: Out of bed ambulation, discharge  Patient Goals: Discharge    Progress made toward(s) clinical / shift goals:    Problem: Pain - Standard  Goal: Alleviation of pain or a reduction in pain to the patient’s comfort goal  Outcome: Met     Problem: Knowledge Deficit - Standard  Goal: Patient and family/care givers will demonstrate understanding of plan of care, disease process/condition, diagnostic tests and medications  Outcome: Met

## 2022-07-13 NOTE — PROGRESS NOTES
4 Eyes Skin Assessment Completed by SHANKAR Sarkar and SHANKAR Mata.    Head WDL  Ears WDL  Nose WDL  Mouth WDL  Neck WDL  Breast/Chest WDL  Shoulder Blades WDL  Spine WDL  (R) Arm/Elbow/Hand WDL  (L) Arm/Elbow/Hand WDL  Abdomen: 4x lap site dressed with 2x2 dry gauze and tegaderm CDI  Groin WDL  Scrotum/Coccyx/Buttocks WDL  (R) Leg WDL  (L) Leg WDL  (R) Heel/Foot/Toe WDL  (L) Heel/Foot/Toe WDL      Devices In Places: Eye glasses; 18g PIV RAC CDI    Interventions In Place N/A    Possible Skin Injury No    Pictures Uploaded Into Epic N/A  Wound Consult Placed N/A  RN Wound Prevention Protocol Ordered No

## 2022-07-13 NOTE — OP REPORT
DATE OF SERVICE:  07/12/2022     PREOPERATIVE DIAGNOSIS:  Small bowel anastomotic intussusception.     POSTOPERATIVE DIAGNOSIS:  Small bowel anastomotic intussusception.     PROCEDURE:  Laparoscopic resection of small bowel and anastomosis with   reanastomosis x2.     SURGEON:  John H. Ganser, MD     ASSISTANT:  Trell Rodriguez PA-C     ANESTHESIA:  General.     ANESTHESIOLOGIST:  Nikita Martin MD     INDICATIONS:  The patient is a 51-year-old female with remote history of a   duodenal switch for morbid obesity.  She has developed intermittent abdominal   pains and CT scan during one of these episodes recently showed an obvious   small bowel intussusception.  This most likely was occurring at the   anastomosis within her bowel related to her duodenal switch.  Risks, benefits   and alternatives to the above outlined procedure were outlined in detail.    Questions answered and she wished to proceed.     DESCRIPTION OF PROCEDURE:  The patient was identified, general anesthetic   administered.  Her abdomen was prepped and draped in the usual sterile   fashion.  Local anesthesia 0.5% Marcaine with epinephrine was injected prior   to skin incision.  Small incision was made in the right lateral subcostal   region, the Veress needle passed and she has had a prior upper midline   incision.  The abdomen was insufflated with carbon dioxide without incident   and a 5 mm blunt trocar and a 5 mm 30-degree scope inserted.  There were no   adhesions to the abdominal wall.  A 5 mm trocar was placed in the low midline   and 5 mm trocar placed there and the camera inserted through the trocar.  The   right subcostal trocar was switched out for a 12 mm.  Left mid abdominal 12,   left lateral subcostal 5 mm trocars were placed under direct vision.    Obviously very dilated area of the small bowel was identified which was at the   anastomosis.  On inspecting this, there was apparent intussusception coming   from the superior aspect  inferiorly.  The anastomosis itself again was very   cavernous and dilated.  It was elected to resect the entire anastomosis, all 3   limbs, and reanastomose.  The small bowel was divided on both sides with the   Reeds 60 powered stapler using a white load.  The biliopancreatic limb was   then divided coming in the side with an additional firing of stapler.  The   mesentery between these points was divided with several firings of stapler   using gray loads with good control.  Specimen was left in the abdomen to be   removed later.     The common channel bowel was then reanastomosed in a side-to-side fashion with   the Reeds 60 stapler fired to about 5 cm.  The stapler defect was elevated   with sutures and closed with additional firing of stapler.  The mesenteric   defect was closed with a running 0 Surgidac suture.  This resulted in a   functional end-to-end anastomosis.     About 15-20 cm proximal to that, the biliopancreatic limb was anastomosed to   the bowel in the side-to-side fashion with the stapler using 60 mm white load   fired to about 4 cm.  The stapler defect was elevated with sutures and closed   with additional firing of stapler.  The anastomosis was supported with 0   Surgidac serosal sutures and there was a very large mesenteric defect and to   prevent internal herniation this was closed in a pursestring type fashion with   0 Surgidac.  The abdomen was irrigated and hemostasis assured.  All   anastomoses appeared widely patent.  Omentum was then brought down over this,   the anastomosis and small bowel.     The specimen was placed in endoscopic bag, withdrawn through the left mid   abdominal trocar site, which had to be enlarged slightly.  Hemostasis was   assured and the trocar was withdrawn.  The fascia at the left abdominal 12 mm   trocar site was closed with a 0 Vicryl figure-of-eight suture and the skin   incisions closed with 4-0 Vicryl subcuticular sutures.  Sterile dressings were    applied.  The patient returned to recovery in stable condition.        ______________________________  JOHN H. GANSER, MD [facility]AVIA    DD:  07/12/2022 17:19  DT:  07/12/2022 20:39    Job#:  [id number]    CC:CHEKO URIBE MD

## 2022-07-13 NOTE — OR SURGEON
Immediate Post OP Note    PreOp Diagnosis: Small bowel intussusception      PostOp Diagnosis: Same      Procedure(s):  LAPAROSCOPIC SMALL BOWEL RESECTION WITH ANASTAMOSIS X2 - Wound Class: Clean Contaminated    Surgeon(s):  John H Ganser, M.D.    Anesthesiologist/Type of Anesthesia:  Anesthesiologist: Nikita Martin M.D./General    Surgical Staff:  Assistant: CHRIS Maldonado  Circulator: Alberto Gudino R.N.; Nanette Jose R.N.; Donna Ambrocio R.N.  Scrub Person: Lacey Mclaughlin    Specimens removed if any:  ID Type Source Tests Collected by Time Destination   A : Small Bowel Anastamosis Other Other PATHOLOGY SPECIMEN John H Ganser, M.D. 7/12/2022  5:00 PM        Estimated Blood Loss: 0    Findings: 0    Complications: 0        7/12/2022 5:13 PM John H Ganser, M.D.   Impression: Open angle with borderline findings, low risk, bilateral: H40.013. Plan: IOP is stable OU today. OCTg and Pachymetry are both normal today. Recommend patient return for Glaucoma VF 24-2 next visit. Patient declined VF testing today. Blurred vision may be due to cataracts, if VF is normal, consider cataract surgery. Explained glaucoma suspect. I have recommended patient have baseline glaucoma testing including visual field, tomography, fundus photo, pachymetry and gonioscopy. I have also stressed the importance of close monitoring, and patient compliance with follow-up appointments. The risk of blindness, loss of vision and need for possible surgery was explained in detail to patient.

## 2022-07-13 NOTE — DISCHARGE INSTRUCTIONS
Advance diet as tolerated to Bariatric full liquids today, continue until postop appt. Strive for 5-10 ouces of fluid per hour while awake. Follow guidelines in preop booklet for food choices.    2.   Incentive spirometry Q hr while awake, continue at home.    3.   Ok to Shower over Tegaderms, remove Tegaderms in four days. Once bandages have been removed, ok to shower and leave wounds uncovered, but no baths or soaks x 14      days.    4.   No driving x 4-5 days.    5.   No lifting > 15 lbs until after post-op appt.    6.   Pt Counseled re: I.S., diet, activity, home med's, continued use of incentive spirometer at home,  and wound care.    7.   Ok to start  MVI/supplements tomorrow.    8. CALL IF YOU: (1) fevers greater than 101.0 degrees F;    (2) Unusual chest or leg pain, redness or swelling behind the knee or in the calf muscle;  (3) Drainage or fluid from incision that may be foul smelling, increased tenderness or soreness at the wound or the wound edges are no longer together, redness or swelling at the incision site.    (4) Please do not hesitate to call with any other questions. ([phone])    9.   Follow-up with Dr. Ganser ~ 1-2 weeks.

## 2022-07-13 NOTE — PROGRESS NOTES
Pt arrived onto unit in stable condition, states she feels great, no pain  Ambulatory from gurney to bed, steady gait, up self    Bariatric clear liquid diet in place, pt educated, PO medication to be crushed or liquid form, pt verbalizes understanding  All needs met at this time, call light in reach, belongings at bedside

## 2022-07-13 NOTE — PROGRESS NOTES
Discharge paperwork discussed with patient, patient concerned MD orders to discontinue all supplements until after post-op followup in 1-2 weeks as her HgB drops dramatically without them.  Patient also concerned about home meds order for Norco, requesting Tramadol instead.    MARINA Cai notified via Voal[employer] - no new orders at this time

## 2022-07-13 NOTE — ANESTHESIA TIME REPORT
Anesthesia Start and Stop Event Times     Date Time Event    7/12/2022 1520 Ready for Procedure     1551 Anesthesia Start     1719 Anesthesia Stop        Responsible Staff  07/12/22    Name Role Begin End    Nikita Martin M.D. Anesth 1551 1719        Overtime Reason:  no overtime (within assigned shift)    Comments:

## 2022-07-13 NOTE — PROGRESS NOTES
Bedside report received from previous shift RN and assumed full care of patient.  Assessments complete as per flowsheets.    Alert & Oriented x4.   Patient denies chest pain/shortness of breath.  Patient reports 4/10 discomfort, declines PRN medication .     Medication given per MAR.     Patient tolerating diet, currently denies nausea and vomiting.    Patient ambulating independently with steady gait.    All patient needs have been met at this time, call light within reach.  Reinforced falls program rationale with patient.   Verified bed is in low and locked position, non-skid socks in place.    Hourly rounding in place.

## 2022-07-13 NOTE — OR NURSING
1717: Pt arrives to PACU asleep and calm. VSS. 4 sites to abdomen all CDI, ice pack applied.    1750: Pts daughter called and updated.     1955: Pt states pain is tolerable and denies nausea. Sites all CDI. Report called to Mello CHAPARRO.

## 2022-07-13 NOTE — CARE PLAN
Problem: Pain - Standard  Goal: Alleviation of pain or a reduction in pain to the patient’s comfort goal  Outcome: Progressing     Problem: Knowledge Deficit - Standard  Goal: Patient and family/care givers will demonstrate understanding of plan of care, disease process/condition, diagnostic tests and medications  Outcome: Progressing     The patient is Stable - Low risk of patient condition declining or worsening    Shift Goals  Clinical Goals: mobilize, pain control  Patient Goals: tolerate diet    Progress made toward(s) clinical / shift goals:  Pt mobilizing up self, steady gait, tolerating clear liquids, pain controlled with PRN medication. Pt c/o mild nausea beginning of shift, pt reports relief from ODT zofran    Patient is not progressing towards the following goals:

## 2022-09-23 ENCOUNTER — EH NON-PROVIDER (OUTPATIENT)
Dept: OCCUPATIONAL MEDICINE | Facility: CLINIC | Age: 52
End: 2022-09-23

## 2022-09-23 ENCOUNTER — HOSPITAL ENCOUNTER (OUTPATIENT)
Facility: MEDICAL CENTER | Age: 52
End: 2022-09-23
Attending: NURSE PRACTITIONER
Payer: COMMERCIAL

## 2022-09-23 ENCOUNTER — EMPLOYEE HEALTH (OUTPATIENT)
Dept: OCCUPATIONAL MEDICINE | Facility: CLINIC | Age: 52
End: 2022-09-23

## 2022-09-23 DIAGNOSIS — Z02.1 PRE-EMPLOYMENT DRUG SCREENING: ICD-10-CM

## 2022-09-23 DIAGNOSIS — Z02.89 ENCOUNTER FOR OCCUPATIONAL HEALTH ASSESSMENT: ICD-10-CM

## 2022-09-23 DIAGNOSIS — Z02.1 PRE-EMPLOYMENT HEALTH SCREENING EXAMINATION: ICD-10-CM

## 2022-09-23 LAB
AMP AMPHETAMINE: NORMAL
BAR BARBITURATES: NORMAL
BZO BENZODIAZEPINES: NORMAL
COC COCAINE: NORMAL
INT CON NEG: NORMAL
INT CON POS: NORMAL
MDMA ECSTASY: NORMAL
MET METHAMPHETAMINES: NORMAL
MTD METHADONE: NORMAL
OPI OPIATES: NORMAL
OXY OXYCODONE: NORMAL
PCP PHENCYCLIDINE: NORMAL
POC URINE DRUG SCREEN OCDRS: NEGATIVE
THC: NORMAL

## 2022-09-23 PROCEDURE — 86480 TB TEST CELL IMMUN MEASURE: CPT | Performed by: NURSE PRACTITIONER

## 2022-09-23 PROCEDURE — 94375 RESPIRATORY FLOW VOLUME LOOP: CPT | Performed by: NURSE PRACTITIONER

## 2022-09-23 PROCEDURE — 86765 RUBEOLA ANTIBODY: CPT | Performed by: NURSE PRACTITIONER

## 2022-09-23 PROCEDURE — 86735 MUMPS ANTIBODY: CPT | Performed by: NURSE PRACTITIONER

## 2022-09-23 PROCEDURE — 80305 DRUG TEST PRSMV DIR OPT OBS: CPT | Performed by: NURSE PRACTITIONER

## 2022-09-23 PROCEDURE — 86787 VARICELLA-ZOSTER ANTIBODY: CPT | Performed by: NURSE PRACTITIONER

## 2022-09-23 PROCEDURE — 86762 RUBELLA ANTIBODY: CPT | Performed by: NURSE PRACTITIONER

## 2022-09-23 PROCEDURE — 8915 PR COMPREHENSIVE PHYSICAL: Performed by: NURSE PRACTITIONER

## 2022-09-26 LAB
MEV IGG SER-ACNC: >300 AU/ML
MUV IGG SER IA-ACNC: 27.3 AU/ML
RUBV AB SER QL: 167 IU/ML
VZV IGG SER IA-ACNC: 2838 IV

## 2022-10-06 ENCOUNTER — IMMUNIZATION (OUTPATIENT)
Dept: OCCUPATIONAL MEDICINE | Facility: CLINIC | Age: 52
End: 2022-10-06

## 2022-10-06 DIAGNOSIS — Z23 NEED FOR VACCINATION: Primary | ICD-10-CM

## 2022-10-06 PROCEDURE — 90686 IIV4 VACC NO PRSV 0.5 ML IM: CPT | Performed by: NURSE PRACTITIONER

## 2022-10-12 ENCOUNTER — TELEPHONE (OUTPATIENT)
Dept: OCCUPATIONAL MEDICINE | Facility: CLINIC | Age: 52
End: 2022-10-12

## 2022-10-12 ENCOUNTER — HOSPITAL ENCOUNTER (OUTPATIENT)
Facility: MEDICAL CENTER | Age: 52
End: 2022-10-12
Attending: PREVENTIVE MEDICINE
Payer: COMMERCIAL

## 2022-10-12 ENCOUNTER — EH NON-PROVIDER (OUTPATIENT)
Dept: OCCUPATIONAL MEDICINE | Facility: CLINIC | Age: 52
End: 2022-10-12

## 2022-10-12 DIAGNOSIS — Z02.89 ENCOUNTER FOR OCCUPATIONAL HEALTH ASSESSMENT: ICD-10-CM

## 2022-10-12 PROCEDURE — 86480 TB TEST CELL IMMUN MEASURE: CPT | Performed by: PREVENTIVE MEDICINE

## 2022-10-14 LAB
GAMMA INTERFERON BACKGROUND BLD IA-ACNC: 0.04 IU/ML
M TB IFN-G BLD-IMP: NEGATIVE
M TB IFN-G CD4+ BCKGRND COR BLD-ACNC: 0.04 IU/ML
MITOGEN IGNF BCKGRD COR BLD-ACNC: >10 IU/ML
QFT TB2 - NIL TBQ2: 0.01 IU/ML

## 2022-10-24 ENCOUNTER — EH NON-PROVIDER (OUTPATIENT)
Dept: OCCUPATIONAL MEDICINE | Facility: CLINIC | Age: 52
End: 2022-10-24

## 2022-11-11 ENCOUNTER — TELEPHONE (OUTPATIENT)
Dept: SCHEDULING | Facility: IMAGING CENTER | Age: 52
End: 2022-11-11

## 2022-12-05 ENCOUNTER — OFFICE VISIT (OUTPATIENT)
Dept: MEDICAL GROUP | Facility: MEDICAL CENTER | Age: 52
End: 2022-12-05
Payer: COMMERCIAL

## 2022-12-05 ENCOUNTER — HOSPITAL ENCOUNTER (OUTPATIENT)
Dept: RADIOLOGY | Facility: MEDICAL CENTER | Age: 52
End: 2022-12-05
Attending: FAMILY MEDICINE
Payer: COMMERCIAL

## 2022-12-05 VITALS
HEIGHT: 63 IN | SYSTOLIC BLOOD PRESSURE: 98 MMHG | BODY MASS INDEX: 26.93 KG/M2 | RESPIRATION RATE: 12 BRPM | OXYGEN SATURATION: 95 % | TEMPERATURE: 98.7 F | HEART RATE: 82 BPM | WEIGHT: 152 LBS | DIASTOLIC BLOOD PRESSURE: 60 MMHG

## 2022-12-05 DIAGNOSIS — J30.2 SEASONAL ALLERGIES: ICD-10-CM

## 2022-12-05 DIAGNOSIS — Z98.84 BARIATRIC SURGERY STATUS: ICD-10-CM

## 2022-12-05 DIAGNOSIS — E03.9 ACQUIRED HYPOTHYROIDISM: ICD-10-CM

## 2022-12-05 DIAGNOSIS — Z00.00 HEALTHCARE MAINTENANCE: ICD-10-CM

## 2022-12-05 DIAGNOSIS — Z12.31 VISIT FOR SCREENING MAMMOGRAM: ICD-10-CM

## 2022-12-05 DIAGNOSIS — F41.9 ANXIETY: ICD-10-CM

## 2022-12-05 DIAGNOSIS — G43.909 MIGRAINE WITHOUT STATUS MIGRAINOSUS, NOT INTRACTABLE, UNSPECIFIED MIGRAINE TYPE: ICD-10-CM

## 2022-12-05 PROCEDURE — 99214 OFFICE O/P EST MOD 30 MIN: CPT | Performed by: STUDENT IN AN ORGANIZED HEALTH CARE EDUCATION/TRAINING PROGRAM

## 2022-12-05 PROCEDURE — 77063 BREAST TOMOSYNTHESIS BI: CPT

## 2022-12-05 PROCEDURE — RXMED WILLOW AMBULATORY MEDICATION CHARGE: Performed by: STUDENT IN AN ORGANIZED HEALTH CARE EDUCATION/TRAINING PROGRAM

## 2022-12-05 RX ORDER — LEVOTHYROXINE SODIUM 88 MCG
88 TABLET ORAL
Qty: 90 TABLET | Refills: 3 | Status: SHIPPED | OUTPATIENT
Start: 2022-12-05 | End: 2023-09-28 | Stop reason: SDUPTHER

## 2022-12-05 RX ORDER — SUMATRIPTAN 100 MG/1
100 TABLET, FILM COATED ORAL
Qty: 10 TABLET | Refills: 3 | Status: SHIPPED | OUTPATIENT
Start: 2022-12-05 | End: 2023-09-25 | Stop reason: SDUPTHER

## 2022-12-05 RX ORDER — BUPROPION HYDROCHLORIDE 150 MG/1
150 TABLET ORAL EVERY MORNING
Qty: 90 TABLET | Refills: 3 | Status: SHIPPED | OUTPATIENT
Start: 2022-12-05 | End: 2023-04-27 | Stop reason: SDUPTHER

## 2022-12-05 RX ORDER — TOPIRAMATE 25 MG/1
25 TABLET ORAL 2 TIMES DAILY
Qty: 180 TABLET | Refills: 3 | Status: SHIPPED | OUTPATIENT
Start: 2022-12-05 | End: 2023-09-28 | Stop reason: SDUPTHER

## 2022-12-05 RX ORDER — FLUTICASONE PROPIONATE 50 MCG
2 SPRAY, SUSPENSION (ML) NASAL DAILY
Qty: 15.8 ML | Refills: 3 | Status: SHIPPED | OUTPATIENT
Start: 2022-12-05 | End: 2023-03-05

## 2022-12-05 SDOH — HEALTH STABILITY: PHYSICAL HEALTH: ON AVERAGE, HOW MANY DAYS PER WEEK DO YOU ENGAGE IN MODERATE TO STRENUOUS EXERCISE (LIKE A BRISK WALK)?: 5 DAYS

## 2022-12-05 SDOH — ECONOMIC STABILITY: TRANSPORTATION INSECURITY
IN THE PAST 12 MONTHS, HAS THE LACK OF TRANSPORTATION KEPT YOU FROM MEDICAL APPOINTMENTS OR FROM GETTING MEDICATIONS?: NO

## 2022-12-05 SDOH — ECONOMIC STABILITY: INCOME INSECURITY: IN THE LAST 12 MONTHS, WAS THERE A TIME WHEN YOU WERE NOT ABLE TO PAY THE MORTGAGE OR RENT ON TIME?: NO

## 2022-12-05 SDOH — ECONOMIC STABILITY: FOOD INSECURITY: WITHIN THE PAST 12 MONTHS, YOU WORRIED THAT YOUR FOOD WOULD RUN OUT BEFORE YOU GOT MONEY TO BUY MORE.: NEVER TRUE

## 2022-12-05 SDOH — ECONOMIC STABILITY: FOOD INSECURITY: WITHIN THE PAST 12 MONTHS, THE FOOD YOU BOUGHT JUST DIDN'T LAST AND YOU DIDN'T HAVE MONEY TO GET MORE.: NEVER TRUE

## 2022-12-05 SDOH — ECONOMIC STABILITY: HOUSING INSECURITY
IN THE LAST 12 MONTHS, WAS THERE A TIME WHEN YOU DID NOT HAVE A STEADY PLACE TO SLEEP OR SLEPT IN A SHELTER (INCLUDING NOW)?: NO

## 2022-12-05 SDOH — ECONOMIC STABILITY: TRANSPORTATION INSECURITY
IN THE PAST 12 MONTHS, HAS LACK OF TRANSPORTATION KEPT YOU FROM MEETINGS, WORK, OR FROM GETTING THINGS NEEDED FOR DAILY LIVING?: NO

## 2022-12-05 SDOH — ECONOMIC STABILITY: TRANSPORTATION INSECURITY
IN THE PAST 12 MONTHS, HAS LACK OF RELIABLE TRANSPORTATION KEPT YOU FROM MEDICAL APPOINTMENTS, MEETINGS, WORK OR FROM GETTING THINGS NEEDED FOR DAILY LIVING?: NO

## 2022-12-05 SDOH — HEALTH STABILITY: PHYSICAL HEALTH: ON AVERAGE, HOW MANY MINUTES DO YOU ENGAGE IN EXERCISE AT THIS LEVEL?: 90 MIN

## 2022-12-05 SDOH — ECONOMIC STABILITY: HOUSING INSECURITY: IN THE LAST 12 MONTHS, HOW MANY PLACES HAVE YOU LIVED?: 1

## 2022-12-05 SDOH — ECONOMIC STABILITY: INCOME INSECURITY: HOW HARD IS IT FOR YOU TO PAY FOR THE VERY BASICS LIKE FOOD, HOUSING, MEDICAL CARE, AND HEATING?: NOT HARD AT ALL

## 2022-12-05 SDOH — HEALTH STABILITY: MENTAL HEALTH
STRESS IS WHEN SOMEONE FEELS TENSE, NERVOUS, ANXIOUS, OR CAN'T SLEEP AT NIGHT BECAUSE THEIR MIND IS TROUBLED. HOW STRESSED ARE YOU?: TO SOME EXTENT

## 2022-12-05 ASSESSMENT — SOCIAL DETERMINANTS OF HEALTH (SDOH)
HOW OFTEN DO YOU ATTENT MEETINGS OF THE CLUB OR ORGANIZATION YOU BELONG TO?: 1 TO 4 TIMES PER YEAR
HOW OFTEN DO YOU GET TOGETHER WITH FRIENDS OR RELATIVES?: MORE THAN THREE TIMES A WEEK
HOW OFTEN DO YOU ATTEND CHURCH OR RELIGIOUS SERVICES?: 1 TO 4 TIMES PER YEAR
HOW MANY DRINKS CONTAINING ALCOHOL DO YOU HAVE ON A TYPICAL DAY WHEN YOU ARE DRINKING: 1 OR 2
HOW OFTEN DO YOU HAVE A DRINK CONTAINING ALCOHOL: 2-3 TIMES A WEEK
HOW OFTEN DO YOU ATTENT MEETINGS OF THE CLUB OR ORGANIZATION YOU BELONG TO?: 1 TO 4 TIMES PER YEAR
DO YOU BELONG TO ANY CLUBS OR ORGANIZATIONS SUCH AS CHURCH GROUPS UNIONS, FRATERNAL OR ATHLETIC GROUPS, OR SCHOOL GROUPS?: YES
HOW OFTEN DO YOU ATTEND CHURCH OR RELIGIOUS SERVICES?: 1 TO 4 TIMES PER YEAR
IN A TYPICAL WEEK, HOW MANY TIMES DO YOU TALK ON THE PHONE WITH FAMILY, FRIENDS, OR NEIGHBORS?: MORE THAN THREE TIMES A WEEK
WITHIN THE PAST 12 MONTHS, YOU WORRIED THAT YOUR FOOD WOULD RUN OUT BEFORE YOU GOT THE MONEY TO BUY MORE: NEVER TRUE
DO YOU BELONG TO ANY CLUBS OR ORGANIZATIONS SUCH AS CHURCH GROUPS UNIONS, FRATERNAL OR ATHLETIC GROUPS, OR SCHOOL GROUPS?: YES
HOW HARD IS IT FOR YOU TO PAY FOR THE VERY BASICS LIKE FOOD, HOUSING, MEDICAL CARE, AND HEATING?: NOT HARD AT ALL
IN A TYPICAL WEEK, HOW MANY TIMES DO YOU TALK ON THE PHONE WITH FAMILY, FRIENDS, OR NEIGHBORS?: MORE THAN THREE TIMES A WEEK
HOW OFTEN DO YOU HAVE SIX OR MORE DRINKS ON ONE OCCASION: NEVER
HOW OFTEN DO YOU GET TOGETHER WITH FRIENDS OR RELATIVES?: MORE THAN THREE TIMES A WEEK

## 2022-12-05 ASSESSMENT — LIFESTYLE VARIABLES
HOW OFTEN DO YOU HAVE SIX OR MORE DRINKS ON ONE OCCASION: NEVER
SKIP TO QUESTIONS 9-10: 1
HOW OFTEN DO YOU HAVE A DRINK CONTAINING ALCOHOL: 2-3 TIMES A WEEK
HOW MANY STANDARD DRINKS CONTAINING ALCOHOL DO YOU HAVE ON A TYPICAL DAY: 1 OR 2
AUDIT-C TOTAL SCORE: 3

## 2022-12-05 ASSESSMENT — PATIENT HEALTH QUESTIONNAIRE - PHQ9
2. FEELING DOWN, DEPRESSED, IRRITABLE, OR HOPELESS: NOT AT ALL
6. FEELING BAD ABOUT YOURSELF - OR THAT YOU ARE A FAILURE OR HAVE LET YOURSELF OR YOUR FAMILY DOWN: NOT AL ALL
7. TROUBLE CONCENTRATING ON THINGS, SUCH AS READING THE NEWSPAPER OR WATCHING TELEVISION: NOT AT ALL
5. POOR APPETITE OR OVEREATING: NOT AT ALL
3. TROUBLE FALLING OR STAYING ASLEEP OR SLEEPING TOO MUCH: NOT AT ALL
SUM OF ALL RESPONSES TO PHQ9 QUESTIONS 1 AND 2: 0
SUM OF ALL RESPONSES TO PHQ QUESTIONS 1-9: 0
9. THOUGHTS THAT YOU WOULD BE BETTER OFF DEAD, OR OF HURTING YOURSELF: NOT AT ALL
8. MOVING OR SPEAKING SO SLOWLY THAT OTHER PEOPLE COULD HAVE NOTICED. OR THE OPPOSITE, BEING SO FIGETY OR RESTLESS THAT YOU HAVE BEEN MOVING AROUND A LOT MORE THAN USUAL: NOT AT ALL
1. LITTLE INTEREST OR PLEASURE IN DOING THINGS: NOT AT ALL
4. FEELING TIRED OR HAVING LITTLE ENERGY: NOT AT ALL

## 2022-12-05 ASSESSMENT — ENCOUNTER SYMPTOMS
COUGH: 0
FOCAL WEAKNESS: 0
CHILLS: 0
NAUSEA: 0
VOMITING: 0
ABDOMINAL PAIN: 0
SHORTNESS OF BREATH: 0
FEVER: 0

## 2022-12-05 ASSESSMENT — FIBROSIS 4 INDEX: FIB4 SCORE: 1.75

## 2022-12-05 NOTE — PROGRESS NOTES
"Subjective:     CC: establish care    HPI:   Regina presents today with    Problem   Healthcare Maintenance    Has appointment in January 2023 with [facility].  Has appointment in January with GYN, Dr. Magana.      Bariatric Surgery Status    Duodenal switch in 2003 in . Lost 220 lbs after surgery, maintaining.     Anxiety    Chronic, stable     Migraine Headache    Chronic, stable     Hypothyroid    Chronic, stable         Health Maintenance: Completed    ROS:  Review of Systems   Constitutional:  Negative for chills and fever.   Respiratory:  Negative for cough and shortness of breath.    Cardiovascular:  Positive for leg swelling. Negative for chest pain.   Gastrointestinal:  Negative for abdominal pain, nausea and vomiting.   Neurological:  Negative for focal weakness.     Objective:     Exam:  BP (!) 98/60 (BP Location: Left arm, Patient Position: Sitting, BP Cuff Size: Adult)   Pulse 82   Temp 37.1 °C (98.7 °F) (Temporal)   Resp 12   Ht 1.6 m (5' 3\")   Wt 68.9 kg (152 lb)   SpO2 95%   BMI 26.93 kg/m²  Body mass index is 26.93 kg/m².    Physical Exam  Vitals reviewed.   HENT:      Head: Normocephalic.      Mouth/Throat:      Mouth: Mucous membranes are moist.      Pharynx: Oropharynx is clear.   Eyes:      Pupils: Pupils are equal, round, and reactive to light.   Cardiovascular:      Rate and Rhythm: Normal rate and regular rhythm.   Pulmonary:      Effort: Pulmonary effort is normal. No respiratory distress.      Breath sounds: No wheezing or rales.   Abdominal:      General: Abdomen is flat. Bowel sounds are normal. There is no distension.      Tenderness: There is no abdominal tenderness. There is no guarding.   Musculoskeletal:      Right lower leg: Edema present.      Left lower leg: Edema present.   Skin:     General: Skin is warm.   Neurological:      General: No focal deficit present.      Mental Status: She is alert and oriented to person, place, and time.     Patient was accompanied by " significant other.    Labs: reviewed    Assessment & Plan:     52 y.o. female with the following -     1. Anxiety  Chronic, stable, continue current regimen.  - buPROPion (WELLBUTRIN XL) 150 MG XL tablet; Take 1 Tablet by mouth every morning for 90 days.  Dispense: 90 Tablet; Refill: 3    2. Seasonal allergies  Chronic, stable, continue current regimen.  - fluticasone (FLONASE) 50 MCG/ACT nasal spray; Administer 2 Sprays into affected nostril(S) every day for 90 days. Each Nostril  Dispense: 16 g; Refill: 3    3. Acquired hypothyroidism  Chronic, stable, continue current regimen.  - SYNTHROID 88 MCG Tab; Take 1 Tablet by mouth every morning on an empty stomach.  Dispense: 90 Tablet; Refill: 3    4. Migraine without status migrainosus, not intractable, unspecified migraine type  Chronic, stable, continue current regimen.  - sumatriptan (IMITREX) 100 MG tablet; Take 1 Tablet by mouth one time as needed for Migraine for up to 1 dose.  Dispense: 10 Tablet; Refill: 3  - topiramate (TOPAMAX) 25 MG Tab; Take 1 Tablet by mouth 2 times a day for 90 days. TAKE 1 TABLET BY MOUTH TWICE DAILY FOR     MIGRAINE HEADACHE -GENERIC FOR TOPAMAX  Dispense: 180 Tablet; Refill: 3    5. Bariatric surgery status  H/o duodenal switch in 2003. Lost 220 lbs after surgery and maintaining weight.    6. Healthcare maintenance  Last PAP smear was done last year at GYN. We will request reports from GYN, Dr. Magana's office.  Requested to sent us vaccinations records, patient is a RN and vaccinated for hep B.  Recommended to get COVID bi-valent booster, Zoster vaccines in pharmacy.      I spent a total of 33 minutes with record review, exam, communication with the patient, communication with other providers, and documentation of this encounter.      Return in about 6 months (around 6/5/2023).    Please note that this dictation was created using voice recognition software. I have made every reasonable attempt to correct obvious errors, but I expect  that there are errors of grammar and possibly content that I did not discover before finalizing the note.

## 2022-12-05 NOTE — LETTER
[facility]  Jayla Martínez M.D.  [address]  [facility] [address]  Fax: [phone]   Authorization for Release/Disclosure of   Protected Health Information   Name: SWATI SCHMIDT : 1970 SSN: [id number]   Address: [address] [place] [id number] Phone:    [phone] (home) [phone] (work)   I authorize the entity listed below to release/disclose the PHI below to:   [facility]/Jayla Martínez M.D. and Jayla Martínez M.D.   Provider or Entity Name:     Address   City, State, [facility]   Phone:      Fax:     Reason for request: continuity of care   Information to be released:    [  ] LAST COLONOSCOPY,  including any PATH REPORT and follow-up  [  ] LAST FIT/COLOGUARD RESULT [  ] LAST DEXA  [  ] LAST MAMMOGRAM  [  ] LAST PAP  [  ] LAST LABS [  ] RETINA EXAM REPORT  [  ] IMMUNIZATION RECORDS  [  ] Release all info      [  ] Check here and initial the line next to each item to release ALL health information INCLUDING  _____ Care and treatment for drug and / or alcohol abuse  _____ HIV testing, infection status, or AIDS  _____ Genetic Testing    DATES OF SERVICE OR TIME PERIOD TO BE DISCLOSED: _____________  I understand and acknowledge that:  * This Authorization may be revoked at any time by you in writing, except if your health information has already been used or disclosed.  * Your health information that will be used or disclosed as a result of you signing this authorization could be re-disclosed by the recipient. If this occurs, your re-disclosed health information may no longer be protected by State or Federal laws.  * You may refuse to sign this Authorization. Your refusal will not affect your ability to obtain treatment.  * This Authorization becomes effective upon signing and will  on (date) __________.      If no date is indicated, this Authorization will  one (1) year from the signature date.    Name: Swati Schmidt    Signature:    Date:     12/5/2022       PLEASE FAX REQUESTED RECORDS BACK TO: [phone]

## 2022-12-08 ENCOUNTER — HOSPITAL ENCOUNTER (OUTPATIENT)
Dept: RADIOLOGY | Facility: MEDICAL CENTER | Age: 52
End: 2022-12-08
Payer: COMMERCIAL

## 2022-12-15 ENCOUNTER — PHARMACY VISIT (OUTPATIENT)
Dept: PHARMACY | Facility: MEDICAL CENTER | Age: 52
End: 2022-12-15
Payer: COMMERCIAL

## 2023-02-08 ENCOUNTER — PHARMACY VISIT (OUTPATIENT)
Dept: PHARMACY | Facility: MEDICAL CENTER | Age: 53
End: 2023-02-08
Payer: COMMERCIAL

## 2023-02-08 PROCEDURE — RXMED WILLOW AMBULATORY MEDICATION CHARGE: Performed by: OBSTETRICS & GYNECOLOGY

## 2023-02-13 ENCOUNTER — PHARMACY VISIT (OUTPATIENT)
Dept: PHARMACY | Facility: MEDICAL CENTER | Age: 53
End: 2023-02-13
Payer: COMMERCIAL

## 2023-02-13 PROCEDURE — RXMED WILLOW AMBULATORY MEDICATION CHARGE: Performed by: OBSTETRICS & GYNECOLOGY

## 2023-02-13 PROCEDURE — RXOTC WILLOW AMBULATORY OTC CHARGE: Performed by: PHARMACIST

## 2023-03-02 ENCOUNTER — TELEMEDICINE (OUTPATIENT)
Dept: MEDICAL GROUP | Facility: MEDICAL CENTER | Age: 53
End: 2023-03-02
Payer: COMMERCIAL

## 2023-03-02 VITALS — BODY MASS INDEX: 26.93 KG/M2 | WEIGHT: 152 LBS | HEIGHT: 63 IN

## 2023-03-02 DIAGNOSIS — G89.29 CHRONIC RIGHT SHOULDER PAIN: ICD-10-CM

## 2023-03-02 DIAGNOSIS — M25.511 CHRONIC RIGHT SHOULDER PAIN: ICD-10-CM

## 2023-03-02 PROBLEM — M25.512 CHRONIC LEFT SHOULDER PAIN: Status: ACTIVE | Noted: 2023-03-02

## 2023-03-02 PROCEDURE — 99213 OFFICE O/P EST LOW 20 MIN: CPT | Mod: 95 | Performed by: STUDENT IN AN ORGANIZED HEALTH CARE EDUCATION/TRAINING PROGRAM

## 2023-03-02 RX ORDER — TRAMADOL HYDROCHLORIDE 50 MG/1
TABLET ORAL
COMMUNITY
End: 2023-04-20

## 2023-03-02 RX ORDER — PHENAZOPYRIDINE HYDROCHLORIDE 200 MG/1
TABLET, FILM COATED ORAL
COMMUNITY
End: 2023-03-02

## 2023-03-02 RX ORDER — NITROFURANTOIN 25; 75 MG/1; MG/1
CAPSULE ORAL
COMMUNITY
End: 2023-03-02

## 2023-03-02 ASSESSMENT — FIBROSIS 4 INDEX: FIB4 SCORE: 1.75

## 2023-03-02 ASSESSMENT — PATIENT HEALTH QUESTIONNAIRE - PHQ9: CLINICAL INTERPRETATION OF PHQ2 SCORE: 0

## 2023-03-02 NOTE — PROGRESS NOTES
Virtual Visit: Established Patient   This visit was conducted via Zoom using secure and encrypted videoconferencing technology.   The patient was in a private location outside of their home in the state of [place].    The patient's identity was confirmed and verbal consent was obtained for this virtual visit.    Subjective:   CC:   Chief Complaint   Patient presents with    Shoulder Pain     (L), requesting a referral      Regina Schmidt is a 52 y.o. female presenting for evaluation and management of:    Patient reports right shoulder pain for 6 weeks. No trauma, fall. No fever, chills, weakness, numbness, focal weakness. Patient h/o left shoulder adhesive capsulitis. Symptoms looks similar.     ROS   Per HPI    Current medicines (including changes today)  Current Outpatient Medications   Medication Sig Dispense Refill    traMADol (ULTRAM) 50 MG Tab tramadol 50 mg tablet   TAKE 1 TABLET BY MOUTH EVERY 6 HOURS AS NEEDED FOR SURGICAL PAIN FOR UP TO 3 DAYS      fluconazole (DIFLUCAN) 150 MG tablet Take 1 tablet by mouth once for one day. 1 Tablet 1    buPROPion (WELLBUTRIN XL) 150 MG XL tablet Take 1 Tablet by mouth every morning for 90 days. 90 Tablet 3    fluticasone (FLONASE) 50 MCG/ACT nasal spray Administer 2 Sprays into affected nostril(S) every day for 90 days. Each Nostril 15.8 mL 3    sumatriptan (IMITREX) 100 MG tablet Take 1 Tablet by mouth one time as needed for Migraine for up to 1 dose. 10 Tablet 3    SYNTHROID 88 MCG Tab Take 1 Tablet by mouth every morning on an empty stomach. 90 Tablet 3    topiramate (TOPAMAX) 25 MG Tab Take 1 Tablet by mouth 2 times a day for 90 days for migraine. 180 Tablet 3    St Johns Wort 300 MG Cap Take 1 Cap by mouth 2 Times a Day.      5-Hydroxytryptophan (5-HTP PO) Take 200 mg by mouth 2 times a day.      levonorgestrel (MIRENA) 52 mg (20 mcg/24 hr) IUD 1 Each by Intrauterine route one time. Every 5 years      Probiotic Product (PROBIOTIC PO) Take 1 Tab by mouth 3  "times a day.      Ferrous Fumarate 325 (106 FE) MG TABS Take 325 mg by mouth 3 times a day.      MULTI VIT/FL PO Take 1 Tablet by mouth every day.      CALCIUM CITRATE 250 MG PO TABS Take 250 mg by mouth 4 times a day.      VITAMIN A Take 25,000 Int'l Units by mouth every day.      VITAMIN C 500 MG PO TABS Take 1,000 mg by mouth 2 Times a Day.      GLUCOSAMINE CHONDRO COMPLEX PO Take 1 Tablet by mouth 2 times a day.       No current facility-administered medications for this visit.       Patient Active Problem List    Diagnosis Date Noted    Chronic right shoulder pain 03/02/2023    Healthcare maintenance 12/05/2022    Intussusception of small bowel (HCC) 07/12/2022    Bariatric surgery status 09/14/2016    Anxiety 09/14/2016    Neck pain, musculoskeletal 11/25/2015    Reactive depression 11/12/2014    Fatigue 10/13/2014    Elevated parathyroid hormone 03/07/2014    Migraine headache 08/23/2012    Hypothyroid 02/09/2011    Lymphedema 01/28/2011    Vitamin D deficiency 02/04/2010    Iron deficiency anemia 02/04/2010        Objective:   Ht 1.6 m (5' 3\") Comment: Pt reported  Wt 68.9 kg (152 lb) Comment: Pt reported  BMI 26.93 kg/m²  RR 14    Physical Exam:  Constitutional: Alert, no distress, well-groomed.  Skin: No rashes in visible areas.  Psych: Alert and oriented x3, normal affect and mood.   Unable to raise fully right arm due to pain     Assessment and Plan:   The following treatment plan was discussed:     1. Chronic right shoulder pain  OTC tylenol or ibuprofen prn  - Referral to Physical Therapy        Follow-up: Return in about 6 months (around 9/2/2023).         "

## 2023-03-10 ENCOUNTER — PHYSICAL THERAPY (OUTPATIENT)
Dept: PHYSICAL THERAPY | Facility: REHABILITATION | Age: 53
End: 2023-03-10
Attending: STUDENT IN AN ORGANIZED HEALTH CARE EDUCATION/TRAINING PROGRAM
Payer: COMMERCIAL

## 2023-03-10 DIAGNOSIS — M25.511 CHRONIC RIGHT SHOULDER PAIN: ICD-10-CM

## 2023-03-10 DIAGNOSIS — G89.29 CHRONIC RIGHT SHOULDER PAIN: ICD-10-CM

## 2023-03-10 PROCEDURE — 97110 THERAPEUTIC EXERCISES: CPT

## 2023-03-10 PROCEDURE — 97161 PT EVAL LOW COMPLEX 20 MIN: CPT

## 2023-03-10 PROCEDURE — 97140 MANUAL THERAPY 1/> REGIONS: CPT

## 2023-03-10 PROCEDURE — 97014 ELECTRIC STIMULATION THERAPY: CPT

## 2023-03-10 ASSESSMENT — ENCOUNTER SYMPTOMS
PAIN SCALE AT HIGHEST: 8
PAIN SCALE: 0

## 2023-03-10 NOTE — OP THERAPY EVALUATION
"  Outpatient Physical Therapy  INITIAL EVALUATION    [facility] Therapy [address].  [address]  [place] [address]  Phone:  [phone]  Fax:  [phone]    Date of Evaluation: 03/10/2023    Patient: Regina Schmidt  YOB: 1970  MRN: [id number]     Referring Provider: Jayla Martínez M.D.  [address] [facility]  [address]  [place] [address]   Referring Diagnosis Chronic right shoulder pain [M25.511, G89.29]     Time Calculation  Start time: 0345  Stop time: 0355 Time Calculation (min): 10 minutes         Chief Complaint: No chief complaint on file.    Visit Diagnoses     ICD-10-CM   1. Chronic right shoulder pain  M25.511    G89.29       Date of onset of impairment: 2022    Subjective   History of Present Illness:     History of chief complaint:  Patient reports similar progression of L shoulder 2 years prior. Patient reports that she was eventually treated for adhesive capsule with PT and recoverved  w/o limit.  R shoulder pain started 2 month ago w/o SONJA.  Patient denied viral or bacterial infection, denies statins, denies auto-immune. Denied diabetes with daily Topamax and thyroid meds    Prior level of function:  RN--    Pain:     Current pain ratin    At worst pain ratin    Location:  Three weeks prior c/o paresthesia  post lateral arm to wrist--abolished past month--circumferiantial \"spasm\" proximal humerus, lateral forearm, occasional    Aggravating factors:  Up 3-5/night  DASH: 56%  Manuel bra w/o pain   Brush hair w/o pain  Doff/manuel jacket  Mouse for more than 10' w/o pian        Past Medical History:   Diagnosis Date    Acute pansinusitis 2015    Fatigue 10/13/2014    Hemorrhagic disorder (HCC) 1975?    Impacted cerumen of both ears 10/13/2014    Lymphedema 2011    Neck pain, musculoskeletal 2015    Reactive depression 2014    S/P gastric bypass 2010    S/P tonsillectomy and adenoidectomy      Past Surgical " "History:   Procedure Laterality Date    BOWEL RESECTION N/A 7/12/2022    Procedure: LAPAROSCOPIC SMALL BOWEL RESECTION WITH ANASTAMOSIS;  Surgeon: John H Ganser, M.D.;  Location: SURGERY [facility];  Service: General    ABDOMINOPLASTY      following weight loss    CHOLECYSTECTOMY      GASTRIC RESECTION      duodenal switch    PRIMARY C SECTION      TONSILLECTOMY AND ADENOIDECTOMY      US-CYST ASPIRATION-BREAST INITIAL         Precautions:       Objective   Observation and functional movement:  Forward head/scap with noted r ir/add protective psotioning    Range of motion and strength:    Shoulder ROM:   AROM: R flexion:  80--erp\"stuck\" Abduction: 75 \"erp\" HBB:PSIS  erp  ER:15 erp  PROM: R flexion: 95 Abduction:85  HBB:  ER:30--noted guarded, empty , painful end feel      Palpation and joint mobility:     TTP: infra>pec> sub scap        Therapeutic Treatments and Modalities:     Therapeutic Treatment and Modalities Summary: Stm to pec and infra with prom  Caudal ghj mobs at end ranges for all cardinal planes gd1-3 with end range isometrics--sub-threshold   DN: Patient signed informed written release and verbally agreed with informed consent to procedure of dry needling   skin prep with isopropyl  Alcohol  -R infra and post deltoid,  -TENS w/ FDN  -supine er/scap retractio #1 and modified box  -No adverse reactions observed post treatment  Russian 10/10 s/l ball roll to infra and deltoid x5' then 5/5/ x 10' with ,hp  Instucted HEP: scap retraction ER, supine and wall modified box ex as tolerated h/o     Time-based treatments/modalities:    Physical Therapy Timed Treatment Charges  Manual therapy minutes (CPT 92471): 15 minutes  Therapeutic exercise minutes (CPT 49381): 10 minutes      Assessment, Response and Plan:   Assessment details:  Patient presents with a infra, sub scap and pectoralis trigger point guarding with pain limiting AROM, difficulty sleeping and performing ADLs. Patient presents with muscle guarding " and pain limiting end ranges with ER/IR equally limited then abduction.  Patient presented with strong,  mild pain with infraspinatus testing with progressive weakness at testing positions of 45 and 90 fle.    Noted forward head and protective R humeral and scapular positioning.  Reproduced patient forearm and humeral pain complaints with DN to upper longitudinal portion of infraspinatus 1-2 cm distal to spine of scap( noted multiple sites of  twitch response but no reproduction of symptoms and with reproduction of patient symptoms there was no observed local twitch response with dry needling). Patient should do well for goals if compliant with POC.     Prognosis: good    Goals:   Short Term Goals:   Sleep through night  DASH: <50%     Brush hair w/o pain    Mouse for more than 10' w/o pain  Short term goal time span:  2-4 weeks      Long Term Goals:      DASH: <10%       Doff/luciana jacket w/o pain   Mouse for more than 40' w/o pain  Long term goal time span:  6-8 weeks    Plan:   Therapy options:  Physical therapy treatment to continue  Planned therapy interventions:  Manual Therapy (CPT 67467), Therapeutic Exercise (CPT 83367) and Mechanical Traction (CPT 02656)  Other planned therapy interventions:  Dry needle  Frequency:  2x week  Duration in weeks:  8  Duration in visits:  16  Plan details:  DN, scap stab, cupping, joint mobs, IASTM, crow and eldoa progression , e-stim    Functional Assessment Used        Referring provider co-signature:  I have reviewed this plan of care and my co-signature certifies the need for services.    Certification Period: 03/10/2023 to  05/14/23    Physician Signature: ________________________________ Date: ______________

## 2023-03-16 ENCOUNTER — PHYSICAL THERAPY (OUTPATIENT)
Dept: PHYSICAL THERAPY | Facility: REHABILITATION | Age: 53
End: 2023-03-16
Attending: STUDENT IN AN ORGANIZED HEALTH CARE EDUCATION/TRAINING PROGRAM
Payer: COMMERCIAL

## 2023-03-16 DIAGNOSIS — M25.511 CHRONIC RIGHT SHOULDER PAIN: ICD-10-CM

## 2023-03-16 DIAGNOSIS — G89.29 CHRONIC RIGHT SHOULDER PAIN: ICD-10-CM

## 2023-03-16 PROCEDURE — 97161 PT EVAL LOW COMPLEX 20 MIN: CPT

## 2023-03-16 PROCEDURE — 97140 MANUAL THERAPY 1/> REGIONS: CPT

## 2023-03-16 PROCEDURE — 97014 ELECTRIC STIMULATION THERAPY: CPT

## 2023-03-16 PROCEDURE — 97110 THERAPEUTIC EXERCISES: CPT

## 2023-03-16 NOTE — OP THERAPY DAILY TREATMENT
"  Outpatient Physical Therapy  DAILY TREATMENT     [facility] Physical Therapy [address].  [address]  [place] [address]  Phone:  [phone]  Fax:  [phone]    Date: 03/16/2023    Patient: Regina Schmidt  YOB: 1970  MRN: [id number]     Time Calculation    Start time: 0724  Stop time: 0850 Time Calculation (min): 86 minutes         Chief Complaint: No chief complaint on file.    Visit #: 2    SUBJECTIVE:  Sore after last vist but overall fewer spasm with a little more motion  OBJECTIVE:  110  Abd 80 4-5/10  Er\" 15            Therapeutic Treatments and Modalities:     Therapeutic Treatment and Modalities Summary: ART: infra  Caudal ghj mobs at end ranges for all cardinal planes gd1-4 with end range isometrics--sub-threshold   Box ex with #0 and roll--hep--modified on top of desk with back lunge for gh fle  Wall /sorrway slide--hep  DN: Patient signed informed written release and verbally agreed with informed consent to procedure of dry needling   skin prep with isopropyl  Alcohol  -R infra and post deltoid,lat. Delt and ant delt  -TENS w/ FDN    -No adverse reactions observed post treatment  Russian 10/10 s/l ball roll to infra and deltoid x10' then 5/5/ x 10' with ,hp  HBB: with scap squeezes--hep    Instucted HEP: scap retraction ER, supine and wall modified box ex as tolerated h/o  Time-based treatments/modalities:    Physical Therapy Timed Treatment Charges  Manual therapy minutes (CPT 17745): 15 minutes  Therapeutic exercise minutes (CPT 62847): 30 minutes      Pain rating (1-10) before treatment:  0  Pain rating (1-10) after treatment:  0  Flr 125  Er25  Abd90  Hbb s2  ASSESSMENT:   Cont.to demonstrate more PROMthan AROM with end range pain and spasms at end ranges--noted limited with L gh er.  Cont. R ER>flex >abd--improving hbb.  Patient also reports less pain at night    PLAN/RECOMMENDATIONS:   Brennon progression, roller, stm, mobs, DN          "

## 2023-03-23 PROCEDURE — RXMED WILLOW AMBULATORY MEDICATION CHARGE: Performed by: STUDENT IN AN ORGANIZED HEALTH CARE EDUCATION/TRAINING PROGRAM

## 2023-03-24 ENCOUNTER — PHARMACY VISIT (OUTPATIENT)
Dept: PHARMACY | Facility: MEDICAL CENTER | Age: 53
End: 2023-03-24
Payer: COMMERCIAL

## 2023-04-20 ENCOUNTER — PHYSICAL THERAPY (OUTPATIENT)
Dept: PHYSICAL THERAPY | Facility: REHABILITATION | Age: 53
End: 2023-04-20
Attending: STUDENT IN AN ORGANIZED HEALTH CARE EDUCATION/TRAINING PROGRAM
Payer: COMMERCIAL

## 2023-04-20 ENCOUNTER — OFFICE VISIT (OUTPATIENT)
Dept: MEDICAL GROUP | Facility: MEDICAL CENTER | Age: 53
End: 2023-04-20
Payer: COMMERCIAL

## 2023-04-20 ENCOUNTER — HOSPITAL ENCOUNTER (OUTPATIENT)
Dept: LAB | Facility: MEDICAL CENTER | Age: 53
End: 2023-04-20
Attending: BEHAVIOR ANALYST
Payer: COMMERCIAL

## 2023-04-20 VITALS
SYSTOLIC BLOOD PRESSURE: 100 MMHG | RESPIRATION RATE: 20 BRPM | WEIGHT: 159.4 LBS | OXYGEN SATURATION: 99 % | DIASTOLIC BLOOD PRESSURE: 60 MMHG | BODY MASS INDEX: 28.24 KG/M2 | HEIGHT: 63 IN | TEMPERATURE: 98.4 F | HEART RATE: 64 BPM

## 2023-04-20 DIAGNOSIS — M54.2 NECK PAIN, MUSCULOSKELETAL: Chronic | ICD-10-CM

## 2023-04-20 DIAGNOSIS — E03.9 HYPOTHYROIDISM, UNSPECIFIED TYPE: Chronic | ICD-10-CM

## 2023-04-20 DIAGNOSIS — M25.511 CHRONIC RIGHT SHOULDER PAIN: ICD-10-CM

## 2023-04-20 DIAGNOSIS — H61.23 IMPACTED CERUMEN OF BOTH EARS: ICD-10-CM

## 2023-04-20 DIAGNOSIS — G89.29 CHRONIC RIGHT SHOULDER PAIN: Chronic | ICD-10-CM

## 2023-04-20 DIAGNOSIS — M25.511 CHRONIC RIGHT SHOULDER PAIN: Chronic | ICD-10-CM

## 2023-04-20 DIAGNOSIS — M25.40 JOINT SWELLING: ICD-10-CM

## 2023-04-20 DIAGNOSIS — G89.29 CHRONIC RIGHT SHOULDER PAIN: ICD-10-CM

## 2023-04-20 DIAGNOSIS — D12.6 ADENOMATOUS POLYP OF COLON, UNSPECIFIED PART OF COLON: ICD-10-CM

## 2023-04-20 PROBLEM — H92.02 LEFT EAR PAIN: Chronic | Status: ACTIVE | Noted: 2023-04-20

## 2023-04-20 PROBLEM — H92.02 LEFT EAR PAIN: Status: ACTIVE | Noted: 2023-04-20

## 2023-04-20 LAB
C3 SERPL-MCNC: 85.1 MG/DL (ref 87–200)
C4 SERPL-MCNC: 19 MG/DL (ref 19–52)
CRP SERPL HS-MCNC: <0.3 MG/DL (ref 0–0.75)
ERYTHROCYTE [SEDIMENTATION RATE] IN BLOOD BY WESTERGREN METHOD: 7 MM/HOUR (ref 0–25)
RHEUMATOID FACT SER IA-ACNC: <10 IU/ML (ref 0–14)

## 2023-04-20 PROCEDURE — 86160 COMPLEMENT ANTIGEN: CPT

## 2023-04-20 PROCEDURE — 97110 THERAPEUTIC EXERCISES: CPT

## 2023-04-20 PROCEDURE — 85652 RBC SED RATE AUTOMATED: CPT

## 2023-04-20 PROCEDURE — 86431 RHEUMATOID FACTOR QUANT: CPT

## 2023-04-20 PROCEDURE — 86038 ANTINUCLEAR ANTIBODIES: CPT

## 2023-04-20 PROCEDURE — 86140 C-REACTIVE PROTEIN: CPT

## 2023-04-20 PROCEDURE — 36415 COLL VENOUS BLD VENIPUNCTURE: CPT

## 2023-04-20 PROCEDURE — 69210 REMOVE IMPACTED EAR WAX UNI: CPT | Mod: 50 | Performed by: BEHAVIOR ANALYST

## 2023-04-20 PROCEDURE — 86162 COMPLEMENT TOTAL (CH50): CPT

## 2023-04-20 PROCEDURE — 97140 MANUAL THERAPY 1/> REGIONS: CPT

## 2023-04-20 PROCEDURE — 99214 OFFICE O/P EST MOD 30 MIN: CPT | Mod: 25 | Performed by: BEHAVIOR ANALYST

## 2023-04-20 PROCEDURE — 97014 ELECTRIC STIMULATION THERAPY: CPT

## 2023-04-20 ASSESSMENT — FIBROSIS 4 INDEX: FIB4 SCORE: 1.75

## 2023-04-20 NOTE — PROGRESS NOTES
Subjective:     CC:    Chief Complaint   Patient presents with    Middle Ear Problem     Lt.ear discomfort x 2 mo    Edema     Of the Rt. Shoulder; pt has had Phys Therapy but its not helping  Pt worries that this may be an auto immune process.          HISTORY OF THE PRESENT ILLNESS: Patient is a 52 y.o. female. This pleasant patient is here today to establish care and discuss shoulder pain and earache.  Prior PCP was Dr. Martínez.    Problem   Left Ear Pain    In feb she thought that she had a sinus infection but avoided antibiotics. She has had ear aches isnce and in the past week its been work.   Denies tinnitis. No discharge.  Admits to decreased hearing. She has tried to remove ear wax.      Adenomatous Polyp of Colon    Fm hx of colon cancer. She had two precancerous polyps.      Chronic Right Shoulder Pain    Had left shoulder pain previous, now right. Patient reports right shoulder pain for    . No trauma, fall. No fever, chills, weakness, numbness, focal weakness. Patient h/o left shoulder adhesive capsulitis.   She completed a few weeks of physical therapy and this has not been helping.  She is worried that this may be an autoimmune process that she has swelling.   Denies other joint pain or swelling.   Mom had osteoarthritis.      Anxiety   Migraine Headache   Hypothyroid    Takes Synthroid 88 mg every morning    Lab Results   Component Value Date/Time    TSHULTRASEN 3.250 10/21/2020 0611          Iron Deficiency Anemia   Neck Pain, Musculoskeletal (Resolved)       Current Outpatient Medications   Medication Sig    fluconazole (DIFLUCAN) 150 MG tablet Take 1 tablet by mouth once for one day.    sumatriptan (IMITREX) 100 MG tablet Take 1 Tablet by mouth one time as needed for Migraine for up to 1 dose.    SYNTHROID 88 MCG Tab Take 1 Tablet by mouth every morning on an empty stomach.    St Johns Wort 300 MG Cap Take 1 Cap by mouth 2 Times a Day.    5-Hydroxytryptophan (5-HTP PO) Take 200 mg by mouth 2  times a day.    levonorgestrel (MIRENA) 52 mg (20 mcg/24 hr) IUD 1 Each by Intrauterine route one time. Every 5 years    Probiotic Product (PROBIOTIC PO) Take 1 Tab by mouth 3 times a day.    Ferrous Fumarate 325 (106 FE) MG TABS Take 325 mg by mouth 3 times a day.    MULTI VIT/FL PO Take 1 Tablet by mouth every day.    CALCIUM CITRATE 250 MG PO TABS Take 250 mg by mouth 4 times a day.    VITAMIN A Take 25,000 Int'l Units by mouth every day.    VITAMIN C 500 MG PO TABS Take 1,000 mg by mouth 2 Times a Day.    GLUCOSAMINE CHONDRO COMPLEX PO Take 1 Tablet by mouth 2 times a day.        Social History     Socioeconomic History    Marital status: Single    Highest education level: Bachelor's degree (e.g., BA, AB, BS)   Tobacco Use    Smoking status: Never    Smokeless tobacco: Never   Vaping Use    Vaping Use: Never used   Substance and Sexual Activity    Alcohol use: Yes     Alcohol/week: 1.8 - 2.4 oz     Types: 3 - 4 Standard drinks or equivalent per week     Comment: occasionally    Drug use: No    Sexual activity: Yes     Partners: Male     Birth control/protection: I.U.D., Surgical     Comment: Mirena  VAS     Social Determinants of Health     Financial Resource Strain: Low Risk     Difficulty of Paying Living Expenses: Not hard at all   Food Insecurity: No Food Insecurity    Worried About Running Out of Food in the Last Year: Never true    Ran Out of Food in the Last Year: Never true   Transportation Needs: No Transportation Needs    Lack of Transportation (Medical): No    Lack of Transportation (Non-Medical): No   Physical Activity: Sufficiently Active    Days of Exercise per Week: 5 days    Minutes of Exercise per Session: 90 min   Stress: Stress Concern Present    Feeling of Stress : To some extent   Social Connections: Moderately Integrated    Frequency of Communication with Friends and Family: More than three times a week    Frequency of Social Gatherings with Friends and Family: More than three times  "a week    Attends Yarsani Services: 1 to 4 times per year    Active Member of Clubs or Organizations: Yes    Attends Club or Organization Meetings: 1 to 4 times per year    Marital Status:    Housing Stability: Low Risk     Unable to Pay for Housing in the Last Year: No    Number of Places Lived in the Last Year: 1    Unstable Housing in the Last Year: No       Family History   Problem Relation Age of Onset    Hypertension Mother     Cancer Mother         breast cancer    Arthritis Mother     Breast Cancer Mother     Cancer Father         lymphoma ,BCC,colon cancer 73    Hypertension Father     Alcohol/Drug Father     Colorectal Cancer Father     Heart Disease Father     Alcohol abuse Father     Hypertension Sister     Hyperlipidemia Sister     Hypertension Brother     Hyperlipidemia Brother     Cancer Maternal Aunt         breast    Breast Cancer Maternal Aunt     Bilateral Breast Cancer Maternal Aunt     Colorectal Cancer Maternal Uncle     Colorectal Cancer Paternal Uncle     Alcohol/Drug Maternal Grandmother         prescription medications, renal failure    Cancer Maternal Grandmother     Stroke Paternal Grandfather     Breast Cancer Maternal Cousin     Ovarian Cancer Neg Hx     Tubal Cancer Neg Hx     Peritoneal Cancer Neg Hx        ROS: See HPI        Objective:     Exam: /60 (BP Location: Left arm, Patient Position: Sitting, BP Cuff Size: Adult long)   Pulse 64   Temp 36.9 °C (98.4 °F) (Temporal)   Resp 20   Ht 1.6 m (5' 3\")   Wt 72.3 kg (159 lb 6.4 oz)   SpO2 99%   BMI 28.24 kg/m²   Body mass index is 28.24 kg/m².    Physical Exam  Constitutional:       Appearance: Normal appearance.   HENT:      Right Ear: There is impacted cerumen.      Left Ear: There is impacted cerumen.   Cardiovascular:      Rate and Rhythm: Normal rate and regular rhythm.      Pulses: Normal pulses.      Heart sounds: Normal heart sounds.   Pulmonary:      Effort: Pulmonary effort is normal.      Breath sounds: " Normal breath sounds.   Musculoskeletal:      Cervical back: Normal range of motion and neck supple.      Comments: Right Shoulder: decreased ROM, 4/5 strength     Neurological:      Mental Status: She is alert.              Assessment & Plan:     52 y.o. female with the following -     1. Hypothyroidism, unspecified type  Chronic, controlled. She is on levothyroxine and tolerating it well. Last TSH was within normal limits. Labs as indicated. Continue levothyroxine 88 mcg    2.  Impacted cerumen of both ears  -New problem.  Causing decreased hearing and ear discomfort.  -Cerumen removal performed in office.  See procedure note    3. Neck pain, musculoskeletal  -Chronic problem, stable.  She has not been worked up for a autoimmune process and several years.  We will follow-up on the below labs once completed.  Continue physical therapy  - C3+C4+COMPT  - CRP QUANTITIVE (NON-CARDIAC); Future  - Sed Rate; Future  - MILTON ANTIBODY WITH REFLEX; Future  - RHEUMATOID ARTHRITIS FACTOR; Future    4. Chronic right shoulder pain  -Chronic problem, not improving.  Significantly reduced range of motion, with little improvement since starting physical therapy.  She has not completed any imaging on the right shoulder.  We will complete x-ray and have her continue physical therapy.  Below labs to rule out rheumatologic process.  - C3+C4+COMPT  - CRP QUANTITIVE (NON-CARDIAC); Future  - Sed Rate; Future  - MILTON ANTIBODY WITH REFLEX; Future  - RHEUMATOID ARTHRITIS FACTOR; Future  - DX-SHOULDER 2+ RIGHT; Future    5. Adenomatous polyp of colon, unspecified part of colon  -Managed by GI.  She will follow-up for repeat colonoscopy in 1 year.  -Red flag symptoms discussed.    6. Joint swelling  - C3+C4+COMPT  - CRP QUANTITIVE (NON-CARDIAC); Future  - Sed Rate; Future  - MILTON ANTIBODY WITH REFLEX; Future  - RHEUMATOID ARTHRITIS FACTOR; Future  - DX-SHOULDER 2+ RIGHT; Future          Return in about 1 week (around 4/27/2023) for Cerumen  impaction chronic conditions, Lab Results.    Please note that this dictation was created using voice recognition software. I have made every reasonable attempt to correct obvious errors, but I expect that there are errors of grammar and possibly content that I did not discover before finalizing the note.

## 2023-04-20 NOTE — PROCEDURES
Procedure: Bilateral cerumen Removal  Risks and benefits of procedure discussed with patient.  Cerumen removed with  lavage   Patient tolerated the procedure well  Pt educated about proper care of ear canal. Q-tip cleaning discouraged, use of debrox and warm water lavage discussed.   Post lavage curette performed by provider, Post procedure exam with clear right canal and normal TM.  However, left canal with deep wax impeding view of TM.  She will use Debrox drops daily in left ear and return in 1 week for additional lavage.  Patient states discomfort and hearing have improved postprocedure.

## 2023-04-20 NOTE — OP THERAPY DAILY TREATMENT
"  Outpatient Physical Therapy  DAILY TREATMENT     [facility] Therapy [address].  [address]  [place] [address]  Phone:  [phone]  Fax:  [phone]    Date: 04/20/2023    Patient: Regina Schmidt  YOB: 1970  MRN: [id number]     Time Calculation    Start time: 0117  Stop time: 0230 Time Calculation (min): 73 minutes         Chief Complaint: No chief complaint on file.    Visit #: 3    SUBJECTIVE:  More rom but feels weak and still limited with Mousing  OBJECTIVE:  Fle: 90  Abd 90 erp \" tight\"  Er\" 10            Therapeutic Treatments and Modalities:     Therapeutic Treatment and Modalities Summary: ART: infra, sub scap  Caudal ghj mobs at end ranges for all cardinal planes gd1-4 with end range isometrics--sub-threshold     DN: Patient signed informed written release and verbally agreed with informed consent to procedure of dry needling   skin prep with isopropyl  Alcohol  -R infra, sub scap t major, lats  -TENS w/ FDN  -tens probe sub scap  -No adverse reactions observed post treatment  Russian 10/10 s/l ball roll to infra and deltoid x10' then 5/5/ x 10' with ,hp  [place] HEP  Wall slides with up/off/on  Instucted HEP: scap retraction ER, supine and wall modified box ex as tolerated h/o  Time-based treatments/modalities:    Physical Therapy Timed Treatment Charges  Manual therapy minutes (CPT 89703): 30 minutes  Therapeutic exercise minutes (CPT 99024): 10 minutes      Pain rating (1-10) before treatment:  0  Pain rating (1-10) after treatment:  0  Flr 100  Er20  Abd95    ASSESSMENT:   Significant pain > 90 deg with abd or flexion--severe trg pt tenderness sub scap and infra with sx reproduction with DN ti iunfra and sub scap--slight loss of ROM since last visit over 30 days ago--HEP compliance ???  PLAN/RECOMMENDATIONS:   gloria Knowles, haleigh, mobs, DN          " Cardiac tamponade s/p window/drainage  Failed renal transplant s/p livan and now with tunneled cath  hypertension  anemia  functional bowel obstruction- resolved    continue current care  urology f/u  f/u stress test  d/c planning once HD setup

## 2023-04-22 LAB
CH50 SERPL-ACNC: 52.1 U/ML (ref 38.7–89.9)
NUCLEAR IGG SER QL IA: NORMAL

## 2023-04-27 ENCOUNTER — OFFICE VISIT (OUTPATIENT)
Dept: MEDICAL GROUP | Facility: MEDICAL CENTER | Age: 53
End: 2023-04-27
Payer: COMMERCIAL

## 2023-04-27 ENCOUNTER — PHYSICAL THERAPY (OUTPATIENT)
Dept: PHYSICAL THERAPY | Facility: REHABILITATION | Age: 53
End: 2023-04-27
Attending: STUDENT IN AN ORGANIZED HEALTH CARE EDUCATION/TRAINING PROGRAM
Payer: COMMERCIAL

## 2023-04-27 VITALS
WEIGHT: 159.72 LBS | HEART RATE: 74 BPM | SYSTOLIC BLOOD PRESSURE: 90 MMHG | OXYGEN SATURATION: 99 % | DIASTOLIC BLOOD PRESSURE: 78 MMHG | BODY MASS INDEX: 28.3 KG/M2 | TEMPERATURE: 99.1 F | HEIGHT: 63 IN | RESPIRATION RATE: 16 BRPM

## 2023-04-27 DIAGNOSIS — F41.9 ANXIETY: ICD-10-CM

## 2023-04-27 DIAGNOSIS — H61.22 IMPACTED CERUMEN OF LEFT EAR: ICD-10-CM

## 2023-04-27 DIAGNOSIS — G89.29 CHRONIC RIGHT SHOULDER PAIN: ICD-10-CM

## 2023-04-27 DIAGNOSIS — M25.511 CHRONIC RIGHT SHOULDER PAIN: ICD-10-CM

## 2023-04-27 DIAGNOSIS — M25.511 CHRONIC RIGHT SHOULDER PAIN: Chronic | ICD-10-CM

## 2023-04-27 DIAGNOSIS — G89.29 CHRONIC RIGHT SHOULDER PAIN: Chronic | ICD-10-CM

## 2023-04-27 PROBLEM — H92.02 LEFT EAR PAIN: Chronic | Status: RESOLVED | Noted: 2023-04-20 | Resolved: 2023-04-27

## 2023-04-27 PROCEDURE — 97110 THERAPEUTIC EXERCISES: CPT

## 2023-04-27 PROCEDURE — 97140 MANUAL THERAPY 1/> REGIONS: CPT

## 2023-04-27 PROCEDURE — 99214 OFFICE O/P EST MOD 30 MIN: CPT | Performed by: BEHAVIOR ANALYST

## 2023-04-27 PROCEDURE — RXMED WILLOW AMBULATORY MEDICATION CHARGE: Performed by: BEHAVIOR ANALYST

## 2023-04-27 PROCEDURE — 97014 ELECTRIC STIMULATION THERAPY: CPT

## 2023-04-27 RX ORDER — METAXALONE 800 MG/1
800 TABLET ORAL 3 TIMES DAILY
Qty: 45 TABLET | Refills: 1 | Status: SHIPPED | OUTPATIENT
Start: 2023-04-27

## 2023-04-27 RX ORDER — BUPROPION HYDROCHLORIDE 150 MG/1
150 TABLET ORAL EVERY MORNING
Qty: 90 TABLET | Refills: 3 | COMMUNITY
Start: 2023-04-27 | End: 2023-09-28 | Stop reason: SDUPTHER

## 2023-04-27 ASSESSMENT — ENCOUNTER SYMPTOMS: SHORTNESS OF BREATH: 0

## 2023-04-27 ASSESSMENT — FIBROSIS 4 INDEX: FIB4 SCORE: 1.75

## 2023-04-27 NOTE — PROGRESS NOTES
Subjective:     CC:    Chief Complaint   Patient presents with    Follow-Up     Ears feel better          HISTORY OF THE PRESENT ILLNESS:   Regina presents today with    Problem   Chronic Right Shoulder Pain    Continues to have some improvement in the right shoulder mobility with physical therapy.  She does have pain in the shoulder and neck from time to time.  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  She is requesting refill of Skelaxin for days when muscles are tight and painful.     Anxiety    She takes bupropion 150 mg/day for anxiety.  She started taking this when she was having stomach issues.  She believes this is working well for her.     Impacted Cerumen of Left Ear    She has used Debrox daily in the left ear since her last appointment a week ago.     Left Ear Pain (Resolved)    In feb she thought that she had a sinus infection but avoided antibiotics. She has had ear aches isnce and in the past week its been work.   Denies tinnitis. No discharge.  Admits to decreased hearing. She has tried to remove ear wax.          Current Outpatient Medications   Medication Sig    metaxalone (SKELAXIN) 800 MG Tab Take 1 Tablet by mouth 3 times a day.    buPROPion (WELLBUTRIN XL) 150 MG XL tablet Take 1 Tablet by mouth every morning.    fluconazole (DIFLUCAN) 150 MG tablet Take 1 tablet by mouth once for one day.    sumatriptan (IMITREX) 100 MG tablet Take 1 Tablet by mouth one time as needed for Migraine for up to 1 dose.    SYNTHROID 88 MCG Tab Take 1 Tablet by mouth every morning on an empty stomach.    St Johns Wort 300 MG Cap Take 1 Cap by mouth 2 Times a Day.    5-Hydroxytryptophan (5-HTP PO) Take 200 mg by mouth 2 times a day.    levonorgestrel (MIRENA) 52 mg (20 mcg/24 hr) IUD 1 Each by Intrauterine route one time. Every 5 years    Probiotic Product (PROBIOTIC PO) Take 1 Tab by mouth 3 times a day.    Ferrous Fumarate 325 (106 FE) MG TABS Take 325 mg by mouth 3 times a day.    MULTI VIT/FL PO Take 1 Tablet by  "mouth every day.    CALCIUM CITRATE 250 MG PO TABS Take 250 mg by mouth 4 times a day.    VITAMIN A Take 25,000 Int'l Units by mouth every day.    VITAMIN C 500 MG PO TABS Take 1,000 mg by mouth 2 Times a Day.    GLUCOSAMINE CHONDRO COMPLEX PO Take 1 Tablet by mouth 2 times a day.          ROS: See HPI  Review of Systems   Constitutional:  Negative for malaise/fatigue.   Respiratory:  Negative for shortness of breath.    Cardiovascular:  Negative for chest pain.       Objective:     Exam: BP 90/78 (BP Location: Left arm, Patient Position: Sitting, BP Cuff Size: Adult long)   Pulse 74   Temp 37.3 °C (99.1 °F) (Temporal)   Resp 16   Ht 1.6 m (5' 3\")   Wt 72.4 kg (159 lb 11.6 oz)   SpO2 99%   BMI 28.29 kg/m²   Body mass index is 28.29 kg/m².    Physical Exam  Constitutional:       Appearance: Normal appearance.   HENT:      Head: Normocephalic and atraumatic.      Left Ear: There is impacted cerumen.   Cardiovascular:      Pulses: Normal pulses.   Pulmonary:      Effort: Pulmonary effort is normal.   Musculoskeletal:      Cervical back: Normal range of motion.   Neurological:      General: No focal deficit present.      Mental Status: She is alert.              Assessment & Plan:     52 y.o. female with the following -     1. Anxiety  -Chronic, stable with bupropion.  Continue current dose.  - buPROPion (WELLBUTRIN XL) 150 MG XL tablet; Take 1 Tablet by mouth every morning.  Dispense: 90 Tablet; Refill: 3    2. Impacted cerumen of left ear  -See procedure notes.    3. Chronic right shoulder pain  -Chronic, mild improvement with physical therapy.  -Muscle relaxer refilled as requested.  Side effects and potential drowsiness discussed with the patient  - metaxalone (SKELAXIN) 800 MG Tab; Take 1 Tablet by mouth 3 times a day.  Dispense: 45 Tablet; Refill: 1          Return in about 3 months (around 7/27/2023) for chronic conditions, Symptoms check.    Please note that this dictation was created using voice " recognition software. I have made every reasonable attempt to correct obvious errors, but I expect that there are errors of grammar and possibly content that I did not discover before finalizing the note.

## 2023-04-27 NOTE — OP THERAPY DAILY TREATMENT
"  Outpatient Physical Therapy  DAILY TREATMENT     [facility] Physical Therapy [address].  [address]  [place] [address]  Phone:  [phone]  Fax:  [phone]    Date: 04/27/2023    Patient: Rgeina Schmidt  YOB: 1970  MRN: [id number]     Time Calculation                   Chief Complaint: No chief complaint on file.    Visit #: 4    SUBJECTIVE:  Shoulder is ok but R upper trap is really hurting lately  OBJECTIVE:  Fle: 90  Abd 90 erp \" tight\"  Er\"20            Therapeutic Treatments and Modalities:     Therapeutic Treatment and Modalities Summary: ART: infra, sub scap  Caudal ghj mobs at end ranges for all cardinal planes gd1-4 with end range isometrics--sub-threshold   Pulley with focus on mv=ovement with elbows in and end range holds// supine 130  Hbb with scap retraction--hep  DN: Patient signed informed written release and verbally agreed with informed consent to procedure of dry needling   skin prep with chlora-prep  -R infra, lateral ant deltoid  -TENS w/ FDN  Vazquez[ine aarom // instructed to push through stretch , not pain  -No adverse reactions observed post treatment  Russian 10/10 s/l ball roll to infra and deltoid x10' then 5/5/ x 10' with ,hp  Reviweed HEP  Wall slides with up/off/on  Pulley with end range holds  Instucted HEP: scap retraction ER, supine and wall modified box ex as tolerated h/o  Time-based treatments/modalities:           Pain rating (1-10) before treatment:  0  Pain rating (1-10) after treatment:  4/10 \"sore from needles...just an ache  Flr 140  Er20  Abd95  Hbb l4    ASSESSMENT:   Significant increase in AAROM after treatment--focussed on end range strength with noted lag of 10 degreees between arom and aarom  PLAN/RECOMMENDATIONS:   Brennon progression, roller, stm, mobs,  in DN, hbb wall push offs         "

## 2023-04-28 NOTE — PROCEDURES
Procedure: Left cerumen Removal  Risks and benefits of procedure discussed with patient.  Cerumen removed with  lavage   Patient tolerated the procedure well  Pt educated about proper care of ear canal. Q-tip cleaning discouraged, use of debrox and warm water lavage discussed.

## 2023-05-02 ENCOUNTER — PHARMACY VISIT (OUTPATIENT)
Dept: PHARMACY | Facility: MEDICAL CENTER | Age: 53
End: 2023-05-02
Payer: COMMERCIAL

## 2023-05-02 ENCOUNTER — APPOINTMENT (OUTPATIENT)
Dept: PHYSICAL THERAPY | Facility: REHABILITATION | Age: 53
End: 2023-05-02
Attending: STUDENT IN AN ORGANIZED HEALTH CARE EDUCATION/TRAINING PROGRAM
Payer: COMMERCIAL

## 2023-05-04 ENCOUNTER — PHYSICAL THERAPY (OUTPATIENT)
Dept: PHYSICAL THERAPY | Facility: REHABILITATION | Age: 53
End: 2023-05-04
Attending: STUDENT IN AN ORGANIZED HEALTH CARE EDUCATION/TRAINING PROGRAM
Payer: COMMERCIAL

## 2023-05-04 DIAGNOSIS — G89.29 CHRONIC RIGHT SHOULDER PAIN: ICD-10-CM

## 2023-05-04 DIAGNOSIS — M25.511 CHRONIC RIGHT SHOULDER PAIN: ICD-10-CM

## 2023-05-04 PROCEDURE — 97014 ELECTRIC STIMULATION THERAPY: CPT

## 2023-05-04 PROCEDURE — 97140 MANUAL THERAPY 1/> REGIONS: CPT

## 2023-05-04 PROCEDURE — 97110 THERAPEUTIC EXERCISES: CPT

## 2023-05-04 NOTE — OP THERAPY DAILY TREATMENT
"  Outpatient Physical Therapy  DAILY TREATMENT     [facility] Physical Therapy [address]  [address]  [place] [address]  Phone:  [phone]  Fax:  [phone]    Date: 05/04/2023    Patient: Regina Schmidt  YOB: 1970  MRN: [id number]     Time Calculation    Start time: 0803  Stop time: 0907 Time Calculation (min): 64 minutes         Chief Complaint: No chief complaint on file.    Visit #: 5    SUBJECTIVE  Lateral deltoid  ache 3-4 since last treatment but with noted increased arom with increased ability to dry hair--slightly more dificulty  OBJECTIVE:  Fle: 110  Abd 97 erp \" tight\"  Er \"20            Therapeutic Treatments and Modalities:     Therapeutic Treatment and Modalities Summary:   Caudal ghj mobs at end ranges for all cardinal planes gd1-4 with end range isometrics--sub-threshold   Pulley with focus on end range  IASTM: deltoid and biceps  Supine gh flexion #1 qwith end range focus  Vazquez[ine aarom // instructed to push through stretch , not pain    Russian 10/10 with end range GH holds with #1 band  Reviwed HEP  Wall slides with up/off/on--reviewed  Pulley with end range holds--reviewed  Instructed HEP: scap retraction ER, supine and wall modified box ex as tolerated h/o  Time-based treatments/modalities:    Physical Therapy Timed Treatment Charges  Manual therapy minutes (CPT 76511): 15 minutes  Therapeutic exercise minutes (CPT 58724): 25 minutes      Pain rating (1-10) before treatment:  3-4  Pain rating (1-10) after treatment:  0 no pain\"  Flr 125 aarom 115 arom  Er20  Abd95  Hbb l3    ASSESSMENT:   Increasing rom with treatment with patient reporting abolition of pain after treatment today  PLAN/RECOMMENDATIONS:   gloria Knowles, stm, mobs,  in DN, hbb wall push offs         "

## 2023-05-09 ENCOUNTER — APPOINTMENT (OUTPATIENT)
Dept: PHYSICAL THERAPY | Facility: REHABILITATION | Age: 53
End: 2023-05-09
Attending: STUDENT IN AN ORGANIZED HEALTH CARE EDUCATION/TRAINING PROGRAM
Payer: COMMERCIAL

## 2023-05-09 PROCEDURE — RXMED WILLOW AMBULATORY MEDICATION CHARGE: Performed by: STUDENT IN AN ORGANIZED HEALTH CARE EDUCATION/TRAINING PROGRAM

## 2023-05-11 ENCOUNTER — APPOINTMENT (OUTPATIENT)
Dept: PHYSICAL THERAPY | Facility: REHABILITATION | Age: 53
End: 2023-05-11
Attending: STUDENT IN AN ORGANIZED HEALTH CARE EDUCATION/TRAINING PROGRAM
Payer: COMMERCIAL

## 2023-05-11 DIAGNOSIS — M25.511 CHRONIC RIGHT SHOULDER PAIN: ICD-10-CM

## 2023-05-11 DIAGNOSIS — G89.29 CHRONIC RIGHT SHOULDER PAIN: ICD-10-CM

## 2023-05-11 PROCEDURE — 97014 ELECTRIC STIMULATION THERAPY: CPT

## 2023-05-11 PROCEDURE — 97140 MANUAL THERAPY 1/> REGIONS: CPT

## 2023-05-11 PROCEDURE — 97110 THERAPEUTIC EXERCISES: CPT

## 2023-05-11 NOTE — OP THERAPY DAILY TREATMENT
"  Outpatient Physical Therapy  DAILY TREATMENT     [facility] Physical Therapy [address].  [address]  [place] [address]  Phone:  [phone]  Fax:  [phone]    Date: 05/11/2023    Patient: Regina Schmidt  YOB: 1970  MRN: [id number]     Time Calculation    Start time: 0848  Stop time: 0955 Time Calculation (min): 67 minutes         Chief Complaint: No chief complaint on file.    Visit #: 6    SUBJECTIVE  Really busy at work past week with linited hep comoliance  OBJECTIVE:  Fle: 100aarom 125  Abd 90 erp \" tight\"  Er \"25            Therapeutic Treatments and Modalities:     Therapeutic Treatment and Modalities Summary:   Caudal ghj mobs at end ranges for all cardinal planes gd1-4 with end range isometrics--sub-threshold   Supine cane er with end range holds  Quadruped rocking--hep  Art pec, lats   HBB wall push offs--hep  DN: Patient signed informed written release and verbally agreed with informed consent to procedure of dry needling   skin prep with Chlora prep  -R deltoid, infra with cupping,  -TENS w/ FDN    -MHP x 10'  -No adverse reactions observed post treatment       Time-based treatments/modalities:    Physical Therapy Timed Treatment Charges  Manual therapy minutes (CPT 91081): 20 minutes  Therapeutic exercise minutes (CPT 64769): 20 minutes      Pain rating (1-10) before treatment:  3-4  Pain rating (1-10) after treatment:  0a little better  Flr 135 aarom 115 arom  Er25  Abd95  Hbb l3    ASSESSMENT:   Increasing prom with cont. Pain /spasm limiting progression but patient tolerated strength ex   PLAN/RECOMMENDATIONS:   Brennon leonarda, roller, stm, mobs,  in DN, hbb wall push offs         "

## 2023-05-12 ENCOUNTER — PHARMACY VISIT (OUTPATIENT)
Dept: PHARMACY | Facility: MEDICAL CENTER | Age: 53
End: 2023-05-12
Payer: COMMERCIAL

## 2023-05-16 ENCOUNTER — PHYSICAL THERAPY (OUTPATIENT)
Dept: PHYSICAL THERAPY | Facility: REHABILITATION | Age: 53
End: 2023-05-16
Attending: STUDENT IN AN ORGANIZED HEALTH CARE EDUCATION/TRAINING PROGRAM
Payer: COMMERCIAL

## 2023-05-16 DIAGNOSIS — M25.511 CHRONIC RIGHT SHOULDER PAIN: ICD-10-CM

## 2023-05-16 DIAGNOSIS — G89.29 CHRONIC RIGHT SHOULDER PAIN: ICD-10-CM

## 2023-05-16 PROCEDURE — 97140 MANUAL THERAPY 1/> REGIONS: CPT

## 2023-05-16 PROCEDURE — 97014 ELECTRIC STIMULATION THERAPY: CPT

## 2023-05-16 PROCEDURE — 97110 THERAPEUTIC EXERCISES: CPT

## 2023-05-17 NOTE — OP THERAPY DAILY TREATMENT
"  Outpatient Physical Therapy  DAILY TREATMENT     [facility] Physical Therapy [address].  [address]  [place] [address]  Phone:  [phone]  Fax:  [phone]    Date: 05/16/2023    Patient: Regina Schmidt  YOB: 1970  MRN: [id number]     Time Calculation    Start time: 0118  Stop time: 0230 Time Calculation (min): 72 minutes         Chief Complaint: No chief complaint on file.    Visit #: 7    SUBJECTIVE  A little better than last week still just feels \"stuck:\"  OBJECTIVE:  Fle: 110 aarom 125  Abd 95 erp \" tight\"  Er \"20          Therapeutic Treatments and Modalities:     Therapeutic Treatment and Modalities Summary:   Caudal ghj mobs at end ranges for all cardinal planes gd1-4 with end range isometrics--sub-threshold   Supine cane er with end range holds  Quadruped rocking--hep--modfied seated with \"box\" set up  Art pec,  subscap    DN: Patient signed informed written release and verbally agreed with informed consent to procedure of dry needling   skin prep with Chlora prep  -sub scap, t minor, lats with tens probe  -No adverse reactions observed post treatment  Polish to deltoid,infra and pec 5/5/ p    Time-based treatments/modalities:    Physical Therapy Timed Treatment Charges  Manual therapy minutes (CPT 46960): 15 minutes  Therapeutic exercise minutes (CPT 62686): 25 minutes      Pain rating (1-10) before treatment:  3-4  Pain rating (1-10) after treatment:  \" better  Flr 145 aarom 125 arom  Er30  Pso047  Hbb l3    ASSESSMENT:   Increasing prom with cont. Pain /spasm limiting rom but patient tolerated strength ex  with increased strength and slight increase in ROM--significant limit with er due to sub scap/t-major and lats  PLAN/RECOMMENDATIONS:   Brennon brower, gloria, stm, mobs,  in DN, hbb wall push offs         "

## 2023-05-18 ENCOUNTER — APPOINTMENT (OUTPATIENT)
Dept: PHYSICAL THERAPY | Facility: REHABILITATION | Age: 53
End: 2023-05-18
Attending: STUDENT IN AN ORGANIZED HEALTH CARE EDUCATION/TRAINING PROGRAM
Payer: COMMERCIAL

## 2023-05-23 ENCOUNTER — APPOINTMENT (OUTPATIENT)
Dept: PHYSICAL THERAPY | Facility: REHABILITATION | Age: 53
End: 2023-05-23
Attending: STUDENT IN AN ORGANIZED HEALTH CARE EDUCATION/TRAINING PROGRAM
Payer: COMMERCIAL

## 2023-05-25 ENCOUNTER — APPOINTMENT (OUTPATIENT)
Dept: PHYSICAL THERAPY | Facility: REHABILITATION | Age: 53
End: 2023-05-25
Attending: STUDENT IN AN ORGANIZED HEALTH CARE EDUCATION/TRAINING PROGRAM
Payer: COMMERCIAL

## 2023-06-08 ENCOUNTER — PHYSICAL THERAPY (OUTPATIENT)
Dept: PHYSICAL THERAPY | Facility: REHABILITATION | Age: 53
End: 2023-06-08
Attending: STUDENT IN AN ORGANIZED HEALTH CARE EDUCATION/TRAINING PROGRAM
Payer: COMMERCIAL

## 2023-06-08 DIAGNOSIS — M25.511 CHRONIC RIGHT SHOULDER PAIN: ICD-10-CM

## 2023-06-08 DIAGNOSIS — G89.29 CHRONIC RIGHT SHOULDER PAIN: ICD-10-CM

## 2023-06-08 PROCEDURE — 97014 ELECTRIC STIMULATION THERAPY: CPT

## 2023-06-08 PROCEDURE — 97110 THERAPEUTIC EXERCISES: CPT

## 2023-06-08 PROCEDURE — 97140 MANUAL THERAPY 1/> REGIONS: CPT

## 2023-06-08 NOTE — OP THERAPY DAILY TREATMENT
Outpatient Physical Therapy  DAILY TREATMENT     [facility] Physical Therapy [address].  [address]  [place] [address]  Phone:  [phone]  Fax:  [phone]    Date: 06/08/2023    Patient: Regina Schmidt  YOB: 1970  MRN: [id number]     Time Calculation    Start time: 0933  Stop time: 1040 Time Calculation (min): 67 minutes         Chief Complaint: No chief complaint on file.    Visit #: 8    SUBJECTIVE:  Better past week    OBJECTIVE          Therapeutic Treatments and Modalities:     Therapeutic Treatment and Modalities Summary:      Caudal ghj mobs at end ranges for all cardinal planes gd1-4 with end range isometrics--sub-threshold     Quadruped rocking--hep add trunk rotation  Table top quadruped stretch with R gh flexion  Art pec,  subscap  Gait trg with post arm swing  Er wall trunk rotation  Gh flex on wall up off on  Uzbek infra and pec 10/10 wall trunk er x 10 hen 5.5/ 10' mhp       Time-based treatments/modalities:    Physical Therapy Timed Treatment Charges  Manual therapy minutes (CPT 42497): 20 minutes  Therapeutic exercise minutes (CPT 66806): 20 minutes      Pain rating (1-10) before treatment:  0  Pain rating (1-10) after treatment:    Arom: 115 prom 140  Abd: 100 prom 110  Er 45   ASSESSMENT:   Increased arom but still guarded with pec and sub scap and limited with cont end range weakness with noted prom > arom    PLAN/RECOMMENDATIONS:   Scap stab with focus on infra recruitment

## 2023-06-09 PROCEDURE — RXMED WILLOW AMBULATORY MEDICATION CHARGE: Performed by: STUDENT IN AN ORGANIZED HEALTH CARE EDUCATION/TRAINING PROGRAM

## 2023-06-12 ENCOUNTER — PHARMACY VISIT (OUTPATIENT)
Dept: PHARMACY | Facility: MEDICAL CENTER | Age: 53
End: 2023-06-12
Payer: COMMERCIAL

## 2023-07-21 ENCOUNTER — PHYSICAL THERAPY (OUTPATIENT)
Dept: PHYSICAL THERAPY | Facility: REHABILITATION | Age: 53
End: 2023-07-21
Attending: STUDENT IN AN ORGANIZED HEALTH CARE EDUCATION/TRAINING PROGRAM
Payer: COMMERCIAL

## 2023-07-21 DIAGNOSIS — M25.511 CHRONIC RIGHT SHOULDER PAIN: ICD-10-CM

## 2023-07-21 DIAGNOSIS — G89.29 CHRONIC RIGHT SHOULDER PAIN: ICD-10-CM

## 2023-07-21 PROCEDURE — 97014 ELECTRIC STIMULATION THERAPY: CPT

## 2023-07-21 PROCEDURE — 97140 MANUAL THERAPY 1/> REGIONS: CPT

## 2023-07-21 PROCEDURE — 97110 THERAPEUTIC EXERCISES: CPT

## 2023-07-21 NOTE — OP THERAPY DAILY TREATMENT
Outpatient Physical Therapy  DAILY TREATMENT     [facility] Physical Therapy [address]  [address]  [place] [address]  Phone:  [phone]  Fax:  [phone]    Date: 07/21/2023    Patient: Regina Schmidt  YOB: 1970  MRN: [id number]     Time Calculation    Start time: 0845  Stop time: 0945 Time Calculation (min): 60 minutes         Chief Complaint: No chief complaint on file.    Visit #: 9    SUBJECTIVE:  Overall, much better with more motion, less pain but still limited donning bra  Able to grab 1/2 gallon of milk w/o pain  OBJECTIVE  Flexion 125--aarom 142  Abd 130  Er  hbb  Quickdash General Total Score: 6.82       Therapeutic Treatments and Modalities:     Therapeutic Treatment and Modalities Summary:   Caudal ghj mobs at end ranges for all cardinal planes gd1-4 with end range isometrics--sub-threshold   Hbb wall push -off strengthening progression --hep   Quadruped rocking--hep add trunk rotation  Table top quadruped stretch with R gh flexion  Art pec,  subscap ART  #1 walk back angels --hep to fatigue hourly  Belizean infra  5/5 supine angels w/ #1 band    x 10' then 5.5/ 10' mhp        Time-based treatments/modalities:    Physical Therapy Timed Treatment Charges  Manual therapy minutes (CPT 40921): 20 minutes  Therapeutic exercise minutes (CPT 57611): 20 minutes      Pain rating (1-10) before treatment:  0  Pain rating (1-10) after treatment:    Arom: 150  Abd: 145   Er 50  Hbb t11  ASSESSMENT:   No more radiating pain down arm and overall less pain with motion but still limited with donning.  Patient able to sleep through night--noted weakness with  sub scap and infra    PLAN/RECOMMENDATIONS:   Scap stab with focus on infra recruitment

## 2023-07-26 NOTE — DISCHARGE PLANNING
[facility]    Medicine Progress Note - Hospitalist Service    Date of Admission:  7/23/2023    Assessment & Plan    Erick Shahid is a 87 year old  male with medical history of cognitive impairment, hyperlipidemia, diabetes mellitus, coronary artery disease, aortic stenosis brought into the ED by wife and son with falls.     Unwitnessed falls.  Physical deconditioning from medical illness, senile frailty.  Progressive cognitive decline.  Patient brought into the ED by family with 2 unwitnessed falls today, progressive cognitive decline.  *In ED  appears chronically ill, frail.  No spinal tenderness. Tenderness bilateral calfs present. Skin no abrasions or ecchymosis or laceration. Strength 5/5 in all extremities.  No arm drift.  Able to answer simple questions.  *Sodium 139 potassium 4.2, calcium 9.8.  AST 22, ALT 11 albumin 4.3.  WBC 6.1.  Hemoglobin 13.4.  *UA nitrite negative, leukocyte esterase negative.  EKG sinus rhythm with first-degree AV block.  Left axis deviation.  Left bundle branch block.  Chest x-ray mild bandlike scarring in the lung bases.  Benign calcified left perihilar lymph nodes.  *CT head no acute pathology.  Diffuse age-related changes.  X-ray pelvis and hip-  No acute fracture or dislocation within the limitations of osteopenia. See  Report for details.  X-ray lumbar spine - good anatomic alignment of the lumbar spine with no evidence of subluxation.  Degenerative disc disease.  *Outpatient TSH, vitamin B12, MRI brain were normal in March 2023.   COVID-19 PCR negative.  CK within normal limits  --PT, OT followed, recommending TCU.  Family in agreement with TCU.  Social work assisting with transition plans. given that it is very important to patient to return home it is reasonable to try TCU. If he finds he is limited by cardiopulmonary symptoms may need to consider increasing home cares vs [facility]  --WOC RN consult for coccyx wound.  --Fall precautions.  --Hold PTA  Case Management Discharge Planning    Admission Date: 7/12/2022  GMLOS:    ALOS: 0    6-Clicks ADL Score: 24  6-Clicks Mobility Score: 24      Anticipated Discharge Dispo: Discharge Disposition: Discharged to home/self care (01)  Discharge Address: [address] [place], [place] [address] [address]  Discharge Contact Phone Number: [phone]    DME Needed: No    Action(s) Taken: Patient states she is discharging today, states has no needs and is independent with ADL's.    Escalations Completed: None    Medically Clear: Yes    Next Steps: dc home    Barriers to Discharge: None    Is the patient up for discharge tomorrow: No, today, daughter is picking her up           vitamins while under observation status.    New cardiomyopathy, HFrEF with WMA   Detectable troponin likely in the setting of fall from demand ischemia.  Coronary artery disease.  Critical aortic stenosis   LBBB.  Hyperlipidemia.  Follows with [facility]. TAVR workup performed in 2021 with CTA showing heavily calcified proximal LAD with possible obstructive stenosis. It appears he was not felt to be a good candidate for angiogram or TAVR given dementia and limited functional capacity at that time.   *Patient denies any chest pain.  Family reporting leg edema, generalized weakness, intermittent cough. Per family report was recommended not a candidate for TAVR/valve surgery [few years back]  *Last echocardiogram from 2021 with severe aortic stenosis, LVEF of 55 to 60%.  *Troponin 32 > 25    *Telemetry monitoring with sinus rhythm, first-degree AV block and bundle branch bloc  *ECHO from 7/25 shows EF 30-35% with moderate to severe global hypokinesis of LV as well as critical AS  --Continue PTA aspirin.  --Continue PTA metoprolol.  --Hold PTA rosuvastatin while under observation status.  --consider low dose diuretic   --discussed at length with pt's wife and patient. He prefers to avoid procedures given his age which is very reasonable. His wife prefers a second opinion from our Cardiology team. I discussed that likely plan would not change but we can certainly discuss with our Cardiology team     Bilateral lower extremity edema.  Family reporting bilateral lower extremity edema with associated pain.  *Ultrasound bilateral lower extremity no DVT.    *ECHO with new HFrEF as below   --Limb elevation.  Ace wraps.     Diabetes with a hemoglobin A1c of 6.0.  Decreased PTA metformin from 1 g daily to 500 mg daily.     Macular degeneration.  Hearing loss.  Continue PTA eyedrops, hearing aids.     Urinary incontinence.   No acute issues.     Diet: Regular Diet Adult    DVT Prophylaxis: Pneumatic Compression Devices  Pablo Catheter:  "Not present  Lines: None     Cardiac Monitoring: None  Code Status: No CPR- Do NOT Intubate      Clinically Significant Risk Factors Present on Admission                # Drug Induced Platelet Defect: home medication list includes an antiplatelet medication    # Chronic heart failure with reduced ejection fraction: last echo with EF <40%     # Overweight: Estimated body mass index is 27.14 kg/m  as calculated from the following:    Height as of this encounter: 1.702 m (5' 7\").    Weight as of this encounter: 78.6 kg (173 lb 4.5 oz).            Disposition Plan      Expected Discharge Date: 07/27/2023      Destination: inpatient rehabilitation facility  Discharge Comments: SW following for placement  PT- TCU        The patient's care was discussed with Dr. Leo who agrees with the above plan     Jessica Diaz PA-C  Hospitalist Service  [facility]  Securely message with [employer] (more info)  Text page via [email or web address] Paging/Directory   ______________________________________________________________________    Interval History   Doing well this am. Just finished breakfast. Denies chest pain, dyspnea, nausea, dizziness. Says he always has edema, not sure if it is worse than normal. He continues to desire to avoid procedures given his age.     Discussed ECHO findings with wife over the phone. She requests second opinion from Cardiology regarding options.     Physical Exam   Temp: 97.5  F (36.4  C) Temp src: Oral BP: 131/68 Pulse: 50   Resp: 16 SpO2: 98 % O2 Device: None (Room air)    Vitals:    07/23/23 1430 07/24/23 0248   Weight: 83.9 kg (185 lb) 78.6 kg (173 lb 4.5 oz)     Vital Signs with Ranges  Temp:  [97.1  F (36.2  C)-98.6  F (37  C)] 97.5  F (36.4  C)  Pulse:  [50-56] 50  Resp:  [16-18] 16  BP: (107-131)/(55-68) 131/68  SpO2:  [97 %-100 %] 98 %  I/O last 3 completed shifts:  In: 600 [P.O.:600]  Out: 375 [Urine:375]    Constitutional: Alert, oriented to hospital, year, month. Forgetful " but appropriately conversant   ENT: moist mucous membranes  Respiratory: Lungs clear to auscultation bilaterally, no increased work of breathing  Cardiovascular: Regular rhythm with mild bradycardia, loud systolic murmur   GI: active bowel sounds, abdomen soft, non-tender  Extremities: 1-2+ bilateral LE edema   MSK:  moves all four extremities.       Medical Decision Making       60 MINUTES SPENT BY ME on the date of service doing chart review, history, exam, documentation & further activities per the note.      Data         Imaging results reviewed over the past 24 hrs:   Recent Results (from the past 24 hour(s))   Echocardiogram Complete   Result Value    LVEF  30-35%    Narrative    [id number]  [id number]  [id number]  [id number]^SOWMYA^NEHEMIAS     [facility]  Echocardiography Laboratory  [address] [place] [address]     Name: BALTA SIMEON  MRN: [id number]  : 1936  Study Date: 2023 12:22 PM  Age: 87 yrs  Gender: Male  Patient Location: [facility]  Reason For Study: Aortic Valve Disorder  Ordering Physician: NEHEMIAS DENG  Referring Physician: NEHEMIAS DENG  Performed By: NIKI Rosenberg     BSA: 1.9 m2  Height: 67 in  Weight: 173 lb  HR: 48  BP: 107/57 mmHg  ______________________________________________________________________________  Procedure  Complete Portable Echo Adult. Optison (NDC #[id number]) given intravenously.  ______________________________________________________________________________  Interpretation Summary     Mild concentric left ventricular hypertrophy.  Left ventricular systolic function is moderate to severely reduced.  The visual ejection fraction is 30-35%.  Moderate to severe global hypokinesia of the left ventricle.  The right ventricle is normal in size and function.  Critical aortic valve stenosis with mild regurgitation.  Peak transaortic velocity 4.8 m/s, mean gradient 63 mmHg, valve area 0.63 cmÂ .  Normal inferior vena cava.     There  is no comparison study available.  ______________________________________________________________________________  Left Ventricle  The left ventricle is normal in size. There is mild concentric left  ventricular hypertrophy. Left ventricular systolic function is moderate to  severely reduced. The visual ejection fraction is 30-35%. Grade I or early  diastolic dysfunction. There is mod-severe global hypokinesia of the left  ventricle.     Right Ventricle  The right ventricle is normal in size and function.     Atria  The left atrium is mildly dilated. Right atrial size is normal.     Mitral Valve  The mitral valve leaflets are mildly thickened. There is trace mitral  regurgitation. There is no mitral valve stenosis.     Tricuspid Valve  Normal tricuspid valve. There is trace tricuspid regurgitation. Right  ventricular systolic pressure could not be approximated due to inadequate  tricuspid regurgitation.     Aortic Valve  The aortic valve is not well visualized. There is mild (1+) aortic  regurgitation. Severe valvular aortic stenosis. The mean AoV pressure gradient  is 63.0 mmHg. The calculated aortic valve are is 0.63 cm^2.     Pulmonic Valve  The pulmonic valve is not well visualized. There is trace pulmonic valvular  regurgitation.     Vessels  The aortic root is not well visualized. The inferior vena cava was normal in  size with preserved respiratory variability.     Pericardium  There is no pericardial effusion.     Rhythm  Sinus rhythm was noted.  ______________________________________________________________________________  MMode/2D Measurements & Calculations  IVSd: 1.2 cm     LVIDd: 4.6 cm  LVIDs: 3.9 cm  LVPWd: 1.0 cm  FS: 15.8 %  LV mass(C)d: 181.9 grams  LV mass(C)dI: 95.7 grams/m2  Ao root diam: 2.9 cm  asc Aorta Diam: 3.4 cm  LVOT diam: 2.0 cm  LVOT area: 3.1 cm2  LA Volume (BP): 71.0 ml  LA Volume Index (BP): 37.4 ml/m2  RV Base: 3.8 cm  RWT: 0.44     TAPSE: 2.7 cm     Doppler Measurements &  Calculations  MV E max terrence: 58.6 cm/sec  MV A max terrence: 114.0 cm/sec  MV E/A: 0.51  MV dec time: 0.42 sec  Ao V2 max: 484.0 cm/sec  Ao max P.7 mmHg  Ao V2 mean: 378.0 cm/sec  Ao mean P.0 mmHg  Ao V2 VTI: 139.0 cm  SHALOM(I,D): 0.63 cm2  SHALOM(V,D): 0.67 cm2  AI P1/2t: 425.2 msec  LV V1 max P.2 mmHg  LV V1 max: 103.0 cm/sec  LV V1 VTI: 27.7 cm  SV(LVOT): 87.0 ml  SI(LVOT): 45.8 ml/m2  PA acc time: 0.09 sec  Pulm Sys Terrence: 64.0 cm/sec  Pulm Dale Terrence: 38.1 cm/sec  Pulm A Revs Terrence: 31.8 cm/sec  Pulm S/D: 1.7  AV Terrence Ratio (DI): 0.21  SHALOM Index (cm2/m2): 0.33  E/E' av.7  Lateral E/e': 12.9  Medial E/e': 12.6     RV S Terrence: 11.0 cm/sec     ______________________________________________________________________________  Report approved by: Dr Hilario Williamson 2023 02:04 PM

## 2023-07-31 PROCEDURE — RXMED WILLOW AMBULATORY MEDICATION CHARGE: Performed by: STUDENT IN AN ORGANIZED HEALTH CARE EDUCATION/TRAINING PROGRAM

## 2023-08-01 ENCOUNTER — PHARMACY VISIT (OUTPATIENT)
Dept: PHARMACY | Facility: MEDICAL CENTER | Age: 53
End: 2023-08-01
Payer: COMMERCIAL

## 2023-09-08 PROCEDURE — RXMED WILLOW AMBULATORY MEDICATION CHARGE: Performed by: STUDENT IN AN ORGANIZED HEALTH CARE EDUCATION/TRAINING PROGRAM

## 2023-09-13 ENCOUNTER — PHARMACY VISIT (OUTPATIENT)
Dept: PHARMACY | Facility: MEDICAL CENTER | Age: 53
End: 2023-09-13
Payer: COMMERCIAL

## 2023-09-13 ENCOUNTER — TELEPHONE (OUTPATIENT)
Dept: PHYSICAL THERAPY | Facility: REHABILITATION | Age: 53
End: 2023-09-13
Payer: COMMERCIAL

## 2023-09-13 NOTE — OP THERAPY DISCHARGE SUMMARY
Outpatient Physical Therapy  DISCHARGE SUMMARY NOTE      [facility] Physical [address]  [address]  [place] [address]  Phone:  [phone]  Fax:  [phone]    Date of Visit: 09/13/2023    Patient: Regina Schmidt  YOB: 1970  MRN: [id number]     Referring Provider: by Jayla Martínez M.D.   Referring Diagnosis Chronic right shoulder pain M25.511, G89.29         Functional Assessment Used  Quickdash General Total Score: 6.82         Your patient is being discharged from Physical Therapy with the following comments:   Goals partially met  Able to grab 1/2 gallon of milk w/o pain  OBJECTIVE  Arom: 150  Abd: 145   Er 50  Hbb t11  No patient contact since   7/ 21 / 23 .  Patient is independent with her HEP and is being discharged from therapy at this time.       Maximo Osullivan, PT, DPT, OCS    Date: 9/13/2023

## 2023-09-25 DIAGNOSIS — G43.909 MIGRAINE WITHOUT STATUS MIGRAINOSUS, NOT INTRACTABLE, UNSPECIFIED MIGRAINE TYPE: ICD-10-CM

## 2023-09-25 PROCEDURE — RXMED WILLOW AMBULATORY MEDICATION CHARGE: Performed by: BEHAVIOR ANALYST

## 2023-09-25 RX ORDER — SUMATRIPTAN 100 MG/1
100 TABLET, FILM COATED ORAL
Qty: 10 TABLET | Refills: 3 | Status: SHIPPED | OUTPATIENT
Start: 2023-09-25

## 2023-09-28 ENCOUNTER — OFFICE VISIT (OUTPATIENT)
Dept: MEDICAL GROUP | Facility: MEDICAL CENTER | Age: 53
End: 2023-09-28
Payer: COMMERCIAL

## 2023-09-28 ENCOUNTER — APPOINTMENT (OUTPATIENT)
Dept: MEDICAL GROUP | Facility: MEDICAL CENTER | Age: 53
End: 2023-09-28
Payer: COMMERCIAL

## 2023-09-28 ENCOUNTER — PATIENT MESSAGE (OUTPATIENT)
Dept: MEDICAL GROUP | Facility: MEDICAL CENTER | Age: 53
End: 2023-09-28

## 2023-09-28 VITALS
WEIGHT: 158 LBS | DIASTOLIC BLOOD PRESSURE: 64 MMHG | OXYGEN SATURATION: 100 % | HEART RATE: 71 BPM | SYSTOLIC BLOOD PRESSURE: 114 MMHG | BODY MASS INDEX: 28 KG/M2 | HEIGHT: 63 IN | TEMPERATURE: 97.9 F

## 2023-09-28 DIAGNOSIS — G43.909 MIGRAINE WITHOUT STATUS MIGRAINOSUS, NOT INTRACTABLE, UNSPECIFIED MIGRAINE TYPE: ICD-10-CM

## 2023-09-28 DIAGNOSIS — Z23 NEED FOR VACCINATION: ICD-10-CM

## 2023-09-28 DIAGNOSIS — Z98.84 BARIATRIC SURGERY STATUS: ICD-10-CM

## 2023-09-28 DIAGNOSIS — E03.9 ACQUIRED HYPOTHYROIDISM: ICD-10-CM

## 2023-09-28 DIAGNOSIS — F41.9 ANXIETY: ICD-10-CM

## 2023-09-28 PROCEDURE — 90471 IMMUNIZATION ADMIN: CPT | Performed by: BEHAVIOR ANALYST

## 2023-09-28 PROCEDURE — 3074F SYST BP LT 130 MM HG: CPT | Performed by: BEHAVIOR ANALYST

## 2023-09-28 PROCEDURE — 99214 OFFICE O/P EST MOD 30 MIN: CPT | Mod: 25 | Performed by: BEHAVIOR ANALYST

## 2023-09-28 PROCEDURE — 3078F DIAST BP <80 MM HG: CPT | Performed by: BEHAVIOR ANALYST

## 2023-09-28 PROCEDURE — RXMED WILLOW AMBULATORY MEDICATION CHARGE: Performed by: BEHAVIOR ANALYST

## 2023-09-28 PROCEDURE — 90686 IIV4 VACC NO PRSV 0.5 ML IM: CPT | Performed by: BEHAVIOR ANALYST

## 2023-09-28 RX ORDER — TOPIRAMATE 25 MG/1
25 TABLET ORAL 2 TIMES DAILY
Qty: 180 TABLET | Refills: 3 | Status: SHIPPED | OUTPATIENT
Start: 2023-09-28

## 2023-09-28 RX ORDER — BUPROPION HYDROCHLORIDE 150 MG/1
150 TABLET ORAL EVERY MORNING
Qty: 90 TABLET | Refills: 3 | Status: SHIPPED | OUTPATIENT
Start: 2023-09-28

## 2023-09-28 RX ORDER — LEVOTHYROXINE SODIUM 88 MCG
88 TABLET ORAL
Qty: 90 TABLET | Refills: 3 | Status: SHIPPED | OUTPATIENT
Start: 2023-09-28

## 2023-09-28 ASSESSMENT — ENCOUNTER SYMPTOMS: SHORTNESS OF BREATH: 0

## 2023-09-28 ASSESSMENT — FIBROSIS 4 INDEX: FIB4 SCORE: 1.75

## 2023-09-28 NOTE — PROGRESS NOTES
"Subjective:     CC:    Chief Complaint   Patient presents with    Requesting Labs    Medication Refill          HISTORY OF THE PRESENT ILLNESS:   Regina presents today with    Problem   Bariatric Surgery Status    Duodenal switch in 2003 in . Lost 220 lbs after surgery, maintaining.     Hypothyroid    Takes Synthroid 88 mg every morning    TSH 2.25 7/2022.            Current Outpatient Medications   Medication Sig    SYNTHROID 88 MCG Tab Take 1 Tablet by mouth every morning on an empty stomach.    buPROPion (WELLBUTRIN XL) 150 MG XL tablet Take 1 Tablet by mouth every morning.    sumatriptan (IMITREX) 100 MG tablet Take 1 Tablet by mouth one time as needed for Migraine for up to 1 dose.    metaxalone (SKELAXIN) 800 MG Tab Take 1 Tablet by mouth 3 times a day.    fluconazole (DIFLUCAN) 150 MG tablet Take 1 tablet by mouth once for one day.    St Johns Wort 300 MG Cap Take 1 Cap by mouth 2 Times a Day.    5-Hydroxytryptophan (5-HTP PO) Take 200 mg by mouth 2 times a day.    levonorgestrel (MIRENA) 52 mg (20 mcg/24 hr) IUD 1 Each by Intrauterine route one time. Every 5 years    Probiotic Product (PROBIOTIC PO) Take 1 Tab by mouth 3 times a day.    Ferrous Fumarate 325 (106 FE) MG TABS Take 325 mg by mouth 3 times a day.    MULTI VIT/FL PO Take 1 Tablet by mouth every day.    CALCIUM CITRATE 250 MG PO TABS Take 250 mg by mouth 4 times a day.    VITAMIN A Take 25,000 Int'l Units by mouth every day.    VITAMIN C 500 MG PO TABS Take 1,000 mg by mouth 2 Times a Day.    GLUCOSAMINE CHONDRO COMPLEX PO Take 1 Tablet by mouth 2 times a day.          ROS: See HPI  Review of Systems   Constitutional:  Negative for malaise/fatigue.   Respiratory:  Negative for shortness of breath.    Cardiovascular:  Negative for chest pain.         Objective:     Exam: /64 (BP Location: Left arm, Patient Position: Sitting, BP Cuff Size: Adult long)   Pulse 71   Temp 36.6 °C (97.9 °F) (Temporal)   Ht 1.6 m (5' 3\")   Wt 71.7 kg (158 lb) "   SpO2 100%   BMI 27.99 kg/m²   Body mass index is 27.99 kg/m².    Physical Exam  Constitutional:       Appearance: Normal appearance.   HENT:      Right Ear: There is no impacted cerumen.      Left Ear: There is no impacted cerumen.   Cardiovascular:      Rate and Rhythm: Normal rate and regular rhythm.      Pulses: Normal pulses.      Heart sounds: Normal heart sounds.   Pulmonary:      Effort: Pulmonary effort is normal.      Breath sounds: Normal breath sounds.   Musculoskeletal:      Cervical back: Normal range of motion and neck supple.   Neurological:      Mental Status: She is alert.                Assessment & Plan:     52 y.o. female with the following -     1. Need for vaccination  - INFLUENZA VACCINE QUAD INJ (PF)    2. Bariatric surgery status  - VITAMIN D,25 HYDROXY (DEFICIENCY); Future  - VITAMIN B12; Future  - FOLATE; Future  - VITAMIN B6; Future  - VITAMIN B1; Future  - IRON/TOTAL IRON BIND; Future  - CBC WITHOUT DIFFERENTIAL; Future  - Comp Metabolic Panel; Future  - PREALBUMIN; Future  - VITAMIN A; Future  - ZINC SERUM; Future  - Lipid Profile; Future  - TSH WITH REFLEX TO FT4; Future  - COPPER, SERUM; Future  - CERULOPLASMIN; Future  - MAGNESIUM; Future  - PHOSPHORUS; Future  - PTH INTACT (PTH ONLY); Future  - VITAMIN B3 (NIACIN AND METABOLITES), S/P; Future  - SELENIUM, SERUM; Future  - MA-SCREENING MAMMO BILAT W/TOMOSYNTHESIS W/CAD; Future    3. Acquired hypothyroidism  - Chronic, controlled. She is on levothyroxine and tolerating it well. Last TSH was within normal limits. Labs as indicated. Continue:  - SYNTHROID 88 MCG Tab; Take 1 Tablet by mouth every morning on an empty stomach.  Dispense: 90 Tablet; Refill: 3  - TSH WITH REFLEX TO FT4; Future    4. Anxiety  -Chronic, stable.  Continue:  - buPROPion (WELLBUTRIN XL) 150 MG XL tablet; Take 1 Tablet by mouth every morning.  Dispense: 90 Tablet; Refill: 3          Return in about 1 year (around 9/28/2024) for Annual preventative.    Please  note that this dictation was created using voice recognition software. I have made every reasonable attempt to correct obvious errors, but I expect that there are errors of grammar and possibly content that I did not discover before finalizing the note.

## 2023-10-07 ENCOUNTER — PHARMACY VISIT (OUTPATIENT)
Dept: PHARMACY | Facility: MEDICAL CENTER | Age: 53
End: 2023-10-07
Payer: COMMERCIAL

## 2023-10-13 ENCOUNTER — PHARMACY VISIT (OUTPATIENT)
Dept: PHARMACY | Facility: MEDICAL CENTER | Age: 53
End: 2023-10-13
Payer: COMMERCIAL

## 2023-10-13 PROCEDURE — RXMED WILLOW AMBULATORY MEDICATION CHARGE: Performed by: INTERNAL MEDICINE

## 2023-10-13 RX ORDER — ZOSTER VACCINE RECOMBINANT, ADJUVANTED 50 MCG/0.5
0.5 KIT INTRAMUSCULAR
Qty: 0.5 ML | Refills: 0 | OUTPATIENT
Start: 2023-10-13 | End: 2023-10-14

## 2023-11-09 ENCOUNTER — PHARMACY VISIT (OUTPATIENT)
Dept: PHARMACY | Facility: MEDICAL CENTER | Age: 53
End: 2023-11-09
Payer: COMMERCIAL

## 2023-11-09 PROCEDURE — RXMED WILLOW AMBULATORY MEDICATION CHARGE: Performed by: INTERNAL MEDICINE

## 2023-11-09 RX ORDER — COVID-19 VACCINE, MRNA 0.04 MG/.418ML
INJECTION, SUSPENSION INTRAMUSCULAR
Qty: 0.3 ML | Refills: 0 | OUTPATIENT
Start: 2023-11-09

## 2023-11-13 ENCOUNTER — HOSPITAL ENCOUNTER (OUTPATIENT)
Dept: LAB | Facility: MEDICAL CENTER | Age: 53
End: 2023-11-13
Attending: BEHAVIOR ANALYST
Payer: COMMERCIAL

## 2023-11-13 DIAGNOSIS — Z98.84 BARIATRIC SURGERY STATUS: ICD-10-CM

## 2023-11-13 LAB
25(OH)D3 SERPL-MCNC: 63 NG/ML (ref 30–100)
ALBUMIN SERPL BCP-MCNC: 3.9 G/DL (ref 3.2–4.9)
ALBUMIN/GLOB SERPL: 1.8 G/DL
ALP SERPL-CCNC: 53 U/L (ref 30–99)
ALT SERPL-CCNC: 19 U/L (ref 2–50)
ANION GAP SERPL CALC-SCNC: 10 MMOL/L (ref 7–16)
AST SERPL-CCNC: 23 U/L (ref 12–45)
BILIRUB SERPL-MCNC: 0.6 MG/DL (ref 0.1–1.5)
BUN SERPL-MCNC: 16 MG/DL (ref 8–22)
CALCIUM ALBUM COR SERPL-MCNC: 8.6 MG/DL (ref 8.5–10.5)
CALCIUM SERPL-MCNC: 8.5 MG/DL (ref 8.5–10.5)
CHLORIDE SERPL-SCNC: 108 MMOL/L (ref 96–112)
CHOLEST SERPL-MCNC: 112 MG/DL (ref 100–199)
CO2 SERPL-SCNC: 22 MMOL/L (ref 20–33)
CREAT SERPL-MCNC: 0.72 MG/DL (ref 0.5–1.4)
ERYTHROCYTE [DISTWIDTH] IN BLOOD BY AUTOMATED COUNT: 49.5 FL (ref 35.9–50)
FOLATE SERPL-MCNC: 6.6 NG/ML
GFR SERPLBLD CREATININE-BSD FMLA CKD-EPI: 101 ML/MIN/1.73 M 2
GLOBULIN SER CALC-MCNC: 2.2 G/DL (ref 1.9–3.5)
GLUCOSE SERPL-MCNC: 79 MG/DL (ref 65–99)
HCT VFR BLD AUTO: 41.8 % (ref 37–47)
HDLC SERPL-MCNC: 53 MG/DL
HGB BLD-MCNC: 13.5 G/DL (ref 12–16)
IRON SATN MFR SERPL: 54 % (ref 15–55)
IRON SERPL-MCNC: 115 UG/DL (ref 40–170)
LDLC SERPL CALC-MCNC: 34 MG/DL
MAGNESIUM SERPL-MCNC: 1.8 MG/DL (ref 1.5–2.5)
MCH RBC QN AUTO: 33.8 PG (ref 27–33)
MCHC RBC AUTO-ENTMCNC: 32.3 G/DL (ref 32.2–35.5)
MCV RBC AUTO: 104.5 FL (ref 81.4–97.8)
PHOSPHATE SERPL-MCNC: 3 MG/DL (ref 2.5–4.5)
PLATELET # BLD AUTO: 142 K/UL (ref 164–446)
PMV BLD AUTO: 11.3 FL (ref 9–12.9)
POTASSIUM SERPL-SCNC: 3.9 MMOL/L (ref 3.6–5.5)
PREALB SERPL-MCNC: 23.9 MG/DL (ref 18–38)
PROT SERPL-MCNC: 6.1 G/DL (ref 6–8.2)
PTH-INTACT SERPL-MCNC: 44 PG/ML (ref 14–72)
RBC # BLD AUTO: 4 M/UL (ref 4.2–5.4)
SODIUM SERPL-SCNC: 140 MMOL/L (ref 135–145)
TIBC SERPL-MCNC: 214 UG/DL (ref 250–450)
TRIGL SERPL-MCNC: 127 MG/DL (ref 0–149)
TSH SERPL DL<=0.005 MIU/L-ACNC: 3.62 UIU/ML (ref 0.38–5.33)
UIBC SERPL-MCNC: 99 UG/DL (ref 110–370)
VIT B12 SERPL-MCNC: 873 PG/ML (ref 211–911)
WBC # BLD AUTO: 4 K/UL (ref 4.8–10.8)

## 2023-11-13 PROCEDURE — 83550 IRON BINDING TEST: CPT

## 2023-11-13 PROCEDURE — 80053 COMPREHEN METABOLIC PANEL: CPT

## 2023-11-13 PROCEDURE — 82746 ASSAY OF FOLIC ACID SERUM: CPT

## 2023-11-13 PROCEDURE — 84443 ASSAY THYROID STIM HORMONE: CPT

## 2023-11-13 PROCEDURE — 84590 ASSAY OF VITAMIN A: CPT

## 2023-11-13 PROCEDURE — 82390 ASSAY OF CERULOPLASMIN: CPT

## 2023-11-13 PROCEDURE — 83540 ASSAY OF IRON: CPT

## 2023-11-13 PROCEDURE — 82525 ASSAY OF COPPER: CPT

## 2023-11-13 PROCEDURE — 84630 ASSAY OF ZINC: CPT

## 2023-11-13 PROCEDURE — 84425 ASSAY OF VITAMIN B-1: CPT

## 2023-11-13 PROCEDURE — 84207 ASSAY OF VITAMIN B-6: CPT

## 2023-11-13 PROCEDURE — 85027 COMPLETE CBC AUTOMATED: CPT

## 2023-11-13 PROCEDURE — 83735 ASSAY OF MAGNESIUM: CPT

## 2023-11-13 PROCEDURE — 36415 COLL VENOUS BLD VENIPUNCTURE: CPT

## 2023-11-13 PROCEDURE — 82607 VITAMIN B-12: CPT

## 2023-11-13 PROCEDURE — 83970 ASSAY OF PARATHORMONE: CPT

## 2023-11-13 PROCEDURE — 84255 ASSAY OF SELENIUM: CPT

## 2023-11-13 PROCEDURE — 84591 ASSAY OF NOS VITAMIN: CPT

## 2023-11-13 PROCEDURE — 82306 VITAMIN D 25 HYDROXY: CPT

## 2023-11-13 PROCEDURE — 80061 LIPID PANEL: CPT

## 2023-11-13 PROCEDURE — 84134 ASSAY OF PREALBUMIN: CPT

## 2023-11-13 PROCEDURE — 84100 ASSAY OF PHOSPHORUS: CPT

## 2023-11-15 LAB
COPPER SERPL-MCNC: 90.3 UG/DL (ref 80–155)
SELENIUM SERPL-MCNC: 100.5 UG/L (ref 23–190)
ZINC SERPL-MCNC: 63.7 UG/DL (ref 60–120)

## 2023-11-16 LAB
ANNOTATION COMMENT IMP: NORMAL
CERULOPLASMIN SERPL-MCNC: 20 MG/DL (ref 16–45)
RETINYL PALMITATE SERPL-MCNC: 0.02 MG/L (ref 0–0.1)
VIT A SERPL-MCNC: 0.72 MG/L (ref 0.3–1.2)
VIT B6 SERPL-MCNC: 70.5 NMOL/L (ref 20–125)

## 2023-11-16 PROCEDURE — RXMED WILLOW AMBULATORY MEDICATION CHARGE: Performed by: BEHAVIOR ANALYST

## 2023-11-17 ENCOUNTER — PHARMACY VISIT (OUTPATIENT)
Dept: PHARMACY | Facility: MEDICAL CENTER | Age: 53
End: 2023-11-17
Payer: COMMERCIAL

## 2023-11-19 LAB — VIT B1 BLD-MCNC: 199 NMOL/L (ref 70–180)

## 2023-11-20 LAB
NIACIN SERPL-MCNC: NORMAL NG/ML
NICOTINAMIDE SERPL-MCNC: 20 NG/ML
NICOTINURATE SERPL-MCNC: NORMAL NG/ML

## 2023-11-21 DIAGNOSIS — D75.89 MACROCYTOSIS: ICD-10-CM

## 2023-12-12 ENCOUNTER — PATIENT MESSAGE (OUTPATIENT)
Dept: MEDICAL GROUP | Facility: MEDICAL CENTER | Age: 53
End: 2023-12-12
Payer: COMMERCIAL

## 2023-12-13 ENCOUNTER — PATIENT MESSAGE (OUTPATIENT)
Dept: MEDICAL GROUP | Facility: MEDICAL CENTER | Age: 53
End: 2023-12-13

## 2023-12-13 ENCOUNTER — HOSPITAL ENCOUNTER (OUTPATIENT)
Dept: RADIOLOGY | Facility: MEDICAL CENTER | Age: 53
End: 2023-12-13
Attending: BEHAVIOR ANALYST
Payer: COMMERCIAL

## 2023-12-13 DIAGNOSIS — Z98.84 BARIATRIC SURGERY STATUS: ICD-10-CM

## 2023-12-13 DIAGNOSIS — R79.89 ABNORMAL CBC: ICD-10-CM

## 2023-12-13 DIAGNOSIS — R53.82 CHRONIC FATIGUE: ICD-10-CM

## 2023-12-13 PROCEDURE — 77063 BREAST TOMOSYNTHESIS BI: CPT

## 2023-12-23 ENCOUNTER — HOSPITAL ENCOUNTER (OUTPATIENT)
Facility: MEDICAL CENTER | Age: 53
End: 2023-12-23
Attending: BEHAVIOR ANALYST
Payer: COMMERCIAL

## 2023-12-23 ENCOUNTER — HOSPITAL ENCOUNTER (OUTPATIENT)
Facility: MEDICAL CENTER | Age: 53
End: 2023-12-23
Attending: STUDENT IN AN ORGANIZED HEALTH CARE EDUCATION/TRAINING PROGRAM
Payer: COMMERCIAL

## 2023-12-23 DIAGNOSIS — R53.82 CHRONIC FATIGUE: ICD-10-CM

## 2023-12-23 DIAGNOSIS — R79.89 ABNORMAL CBC: ICD-10-CM

## 2023-12-23 DIAGNOSIS — D75.89 MACROCYTOSIS: ICD-10-CM

## 2023-12-23 LAB
BASOPHILS # BLD AUTO: 0.8 % (ref 0–1.8)
BASOPHILS # BLD: 0.04 K/UL (ref 0–0.12)
EOSINOPHIL # BLD AUTO: 0.08 K/UL (ref 0–0.51)
EOSINOPHIL NFR BLD: 1.6 % (ref 0–6.9)
ERYTHROCYTE [DISTWIDTH] IN BLOOD BY AUTOMATED COUNT: 50.6 FL (ref 35.9–50)
FERRITIN SERPL-MCNC: 625 NG/ML (ref 10–291)
HCT VFR BLD AUTO: 43.1 % (ref 37–47)
HGB BLD-MCNC: 14.4 G/DL (ref 12–16)
HGB RETIC QN AUTO: 37.6 PG/CELL (ref 29–35)
IMM GRANULOCYTES # BLD AUTO: 0.01 K/UL (ref 0–0.11)
IMM GRANULOCYTES NFR BLD AUTO: 0.2 % (ref 0–0.9)
IMM RETICS NFR: 6.1 % (ref 2.6–16.1)
LDH SERPL L TO P-CCNC: 178 U/L (ref 107–266)
LYMPHOCYTES # BLD AUTO: 1.57 K/UL (ref 1–4.8)
LYMPHOCYTES NFR BLD: 31.2 % (ref 22–41)
MCH RBC QN AUTO: 33.8 PG (ref 27–33)
MCHC RBC AUTO-ENTMCNC: 33.4 G/DL (ref 32.2–35.5)
MCV RBC AUTO: 101.2 FL (ref 81.4–97.8)
MONOCYTES # BLD AUTO: 0.37 K/UL (ref 0–0.85)
MONOCYTES NFR BLD AUTO: 7.4 % (ref 0–13.4)
NEUTROPHILS # BLD AUTO: 2.96 K/UL (ref 1.82–7.42)
NEUTROPHILS NFR BLD: 58.8 % (ref 44–72)
NRBC # BLD AUTO: 0 K/UL
NRBC BLD-RTO: 0 /100 WBC (ref 0–0.2)
PLATELET # BLD AUTO: 206 K/UL (ref 164–446)
PMV BLD AUTO: 10.7 FL (ref 9–12.9)
RBC # BLD AUTO: 4.26 M/UL (ref 4.2–5.4)
RETICS # AUTO: 0.07 M/UL (ref 0.04–0.12)
RETICS/RBC NFR: 1.7 % (ref 0.8–2.6)
WBC # BLD AUTO: 5 K/UL (ref 4.8–10.8)

## 2023-12-23 PROCEDURE — 82784 ASSAY IGA/IGD/IGG/IGM EACH: CPT

## 2023-12-23 PROCEDURE — 84165 PROTEIN E-PHORESIS SERUM: CPT

## 2023-12-23 PROCEDURE — 82728 ASSAY OF FERRITIN: CPT

## 2023-12-23 PROCEDURE — 83615 LACTATE (LD) (LDH) ENZYME: CPT

## 2023-12-23 PROCEDURE — 84155 ASSAY OF PROTEIN SERUM: CPT

## 2023-12-23 PROCEDURE — RXMED WILLOW AMBULATORY MEDICATION CHARGE: Performed by: BEHAVIOR ANALYST

## 2023-12-23 PROCEDURE — 85046 RETICYTE/HGB CONCENTRATE: CPT

## 2023-12-23 PROCEDURE — 85025 COMPLETE CBC W/AUTO DIFF WBC: CPT

## 2023-12-23 PROCEDURE — 86334 IMMUNOFIX E-PHORESIS SERUM: CPT

## 2023-12-23 PROCEDURE — 83521 IG LIGHT CHAINS FREE EACH: CPT | Mod: 91

## 2023-12-27 ENCOUNTER — PATIENT MESSAGE (OUTPATIENT)
Dept: MEDICAL GROUP | Facility: MEDICAL CENTER | Age: 53
End: 2023-12-27

## 2023-12-27 ENCOUNTER — PHARMACY VISIT (OUTPATIENT)
Dept: PHARMACY | Facility: MEDICAL CENTER | Age: 53
End: 2023-12-27
Payer: COMMERCIAL

## 2023-12-27 ENCOUNTER — TELEMEDICINE (OUTPATIENT)
Dept: MEDICAL GROUP | Facility: MEDICAL CENTER | Age: 53
End: 2023-12-27
Payer: COMMERCIAL

## 2023-12-27 VITALS — HEIGHT: 63 IN | WEIGHT: 153 LBS | BODY MASS INDEX: 27.11 KG/M2

## 2023-12-27 DIAGNOSIS — D75.89 MACROCYTOSIS: ICD-10-CM

## 2023-12-27 DIAGNOSIS — R79.89 ELEVATED FERRITIN LEVEL: ICD-10-CM

## 2023-12-27 DIAGNOSIS — D89.89 KAPPA LIGHT CHAIN DISEASE (HCC): ICD-10-CM

## 2023-12-27 DIAGNOSIS — R53.82 CHRONIC FATIGUE: ICD-10-CM

## 2023-12-27 LAB
ALBUMIN SERPL ELPH-MCNC: 4.23 G/DL (ref 3.75–5.01)
ALPHA1 GLOB SERPL ELPH-MCNC: 0.2 G/DL (ref 0.19–0.46)
ALPHA2 GLOB SERPL ELPH-MCNC: 0.46 G/DL (ref 0.48–1.05)
B-GLOBULIN SERPL ELPH-MCNC: 0.55 G/DL (ref 0.48–1.1)
EER MONOCLONAL PROTEIN AND FLC, SERUM Q5224: ABNORMAL
GAMMA GLOB SERPL ELPH-MCNC: 1.25 G/DL (ref 0.62–1.51)
IGA SERPL-MCNC: 192 MG/DL (ref 68–408)
IGG SERPL-MCNC: 1124 MG/DL (ref 768–1632)
IGM SERPL-MCNC: 326 MG/DL (ref 35–263)
INTERPRETATION SERPL IFE-IMP: ABNORMAL
INTERPRETATION SERPL IFE-IMP: ABNORMAL
KAPPA LC FREE SER-MCNC: 32.48 MG/L (ref 3.3–19.4)
KAPPA LC FREE/LAMBDA FREE SER NEPH: 1.38 {RATIO} (ref 0.26–1.65)
LAMBDA LC FREE SERPL-MCNC: 23.52 MG/L (ref 5.71–26.3)
MONOCLONAL PROTEIN NL11656: ABNORMAL G/DL
PROT SERPL-MCNC: 6.7 G/DL (ref 6.3–8.2)

## 2023-12-27 ASSESSMENT — FIBROSIS 4 INDEX: FIB4 SCORE: 1.31

## 2023-12-27 NOTE — PROGRESS NOTES
Virtual Visit: Established Patient   This visit was conducted via Zoom using secure and encrypted videoconferencing technology.   The patient was in their home in the [facility].    The patient's identity was confirmed and verbal consent was obtained for this virtual visit.    Subjective:   CC:   Chief Complaint   Patient presents with    Lab Results     Regina Schmidt is a 53 y.o. female presenting for evaluation and management of lab results    Labs reviewed and discussed with patient  Patient has elevated MCV of 101 and MCH 33.8 and RDW of 15.6.  Most of these lab results are chronic daily stable.    Patient had a bariatric surgery with duodenal switch in 2003 in [place].  Is taking multiple supplements including iron supplements since then.  Her total iron binding capacity is low at 214 and unsaturated iron binding is 99 which is also low.  She has elevated ferritin levels at 625.  Initially I thought this is more related to her excessive intake of iron supplements but patient reports that her ferritin levels were less than 300 till last year and this is a sudden increase in the number.        Patient had  extensive autoimmune workup done back in April 2023 for chronic right shoulder pain and joint swellings which was benign except for mildly low complement C3    ROS     See HPI   Review of systems unremarkable except for concerns noted by patient or items listed.      Current medicines (including changes today)  Current Outpatient Medications   Medication Sig Dispense Refill    COVID-19 mRNA Vac-Lina,Pfizer, (COMIRNATY) 30 MCG/0.3ML Suspension Prefilled Syringe injection Inject  into the shoulder, thigh, or buttocks. 0.3 mL 0    SYNTHROID 88 MCG Tab Take 1 Tablet by mouth every morning on an empty stomach. 90 Tablet 3    buPROPion (WELLBUTRIN XL) 150 MG XL tablet Take 1 Tablet by mouth every morning. 90 Tablet 3    topiramate (TOPAMAX) 25 MG Tab Take 1 Tablet by mouth 2 times a day for 90  "days for migraine. 180 Tablet 3    sumatriptan (IMITREX) 100 MG tablet Take 1 Tablet by mouth one time as needed for Migraine for up to 1 dose. 10 Tablet 3    metaxalone (SKELAXIN) 800 MG Tab Take 1 Tablet by mouth 3 times a day. 45 Tablet 1    fluconazole (DIFLUCAN) 150 MG tablet Take 1 tablet by mouth once for one day. 1 Tablet 1    St Johns Wort 300 MG Cap Take 1 Cap by mouth 2 Times a Day.      5-Hydroxytryptophan (5-HTP PO) Take 200 mg by mouth 2 times a day.      levonorgestrel (MIRENA) 52 mg (20 mcg/24 hr) IUD 1 Each by Intrauterine route one time. Every 5 years      Probiotic Product (PROBIOTIC PO) Take 1 Tab by mouth 3 times a day.      Ferrous Fumarate 325 (106 FE) MG TABS Take 325 mg by mouth 3 times a day.      MULTI VIT/FL PO Take 1 Tablet by mouth every day.      CALCIUM CITRATE 250 MG PO TABS Take 250 mg by mouth 4 times a day.      VITAMIN A Take 25,000 Int'l Units by mouth every day.      VITAMIN C 500 MG PO TABS Take 1,000 mg by mouth 2 Times a Day.      GLUCOSAMINE CHONDRO COMPLEX PO Take 1 Tablet by mouth 2 times a day.       No current facility-administered medications for this visit.       Patient Active Problem List    Diagnosis Date Noted    Adenomatous polyp of colon 04/20/2023    Chronic right shoulder pain 03/02/2023    Healthcare maintenance 12/05/2022    Intussusception of small bowel (HCC) 07/12/2022    Bariatric surgery status 09/14/2016    Anxiety 09/14/2016    Reactive depression 11/12/2014    Fatigue 10/13/2014    Impacted cerumen of left ear 10/13/2014    Elevated parathyroid hormone 03/07/2014    Migraine headache 08/23/2012    Hypothyroid 02/09/2011    Lymphedema 01/28/2011    Vitamin D deficiency 02/04/2010    Iron deficiency anemia 02/04/2010        Objective:   Ht 1.6 m (5' 3\")   Wt 69.4 kg (153 lb) Comment: pt reported  BMI 27.10 kg/m²     Physical Exam:  Constitutional: Alert, no distress, well-groomed.  Skin: No rashes in visible areas.  Eye: Round. Conjunctiva clear, " lids normal. No icterus.   ENMT: Lips pink without lesions, good dentition, moist mucous membranes. Phonation normal.  Neck: No masses, no thyromegaly. Moves freely without pain.  Respiratory: Unlabored respiratory effort, no cough or audible wheeze  Psych: Alert and oriented x3, normal affect and mood.     Assessment and Plan:   The following treatment plan was discussed:     1. Kappa light chain disease (HCC)  2. Chronic fatigue  New problem, unclear etiology    elevated  IgM 326, elevated   Electrophoresis showing decreased alpha-2 globulin at 0.46 very mild and increased free kappa light chains at 32.48.  Her serum calcium levels and creatinine level is normal, normal renal functions  Possible differentials : LC MGUS/ IgM MGUS , or other inflammatory or light chain protein disorders.   Plan:  .- Referral to Hematology Oncology    3. Macrocytosis  Chronic,stable   Unclear etiology   - Referral to Hematology Oncology    4. Elevated ferritin level  New problem, unclear etiology   Inflammatory process vs excessive iron supplements   - Referral to Hematology Oncology      Follow-up: No follow-ups on file.

## 2024-01-17 ENCOUNTER — HOSPITAL ENCOUNTER (OUTPATIENT)
Dept: HEMATOLOGY ONCOLOGY | Facility: MEDICAL CENTER | Age: 54
End: 2024-01-17
Attending: INTERNAL MEDICINE
Payer: COMMERCIAL

## 2024-01-17 VITALS
TEMPERATURE: 97.7 F | SYSTOLIC BLOOD PRESSURE: 90 MMHG | WEIGHT: 165.34 LBS | DIASTOLIC BLOOD PRESSURE: 70 MMHG | HEART RATE: 75 BPM | HEIGHT: 63 IN | OXYGEN SATURATION: 99 % | BODY MASS INDEX: 29.3 KG/M2

## 2024-01-17 DIAGNOSIS — R76.8 ELEVATED SERUM IMMUNOGLOBULIN FREE LIGHT CHAINS: ICD-10-CM

## 2024-01-17 DIAGNOSIS — Z98.84 HISTORY OF BARIATRIC SURGERY: ICD-10-CM

## 2024-01-17 DIAGNOSIS — D75.89 MACROCYTOSIS: ICD-10-CM

## 2024-01-17 PROCEDURE — 99204 OFFICE O/P NEW MOD 45 MIN: CPT | Performed by: INTERNAL MEDICINE

## 2024-01-17 PROCEDURE — 99212 OFFICE O/P EST SF 10 MIN: CPT | Performed by: INTERNAL MEDICINE

## 2024-01-17 ASSESSMENT — FIBROSIS 4 INDEX: FIB4 SCORE: 1.31

## 2024-01-17 NOTE — PROGRESS NOTES
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 Morena Holland because of elevated light chains. She actually came a week early for her appointment. Lab testing was done because of two episodes of a frozen shoulder. She had bariatric surgery 20 years ago and has been on oral iron even prior to that. She has had blood transfusions and iron transfusions in the past. She has a;so had macrocytic RBC indices on every CBC in Epic dating back to 2010. B12, folate, reticulocyte count and LDH are normal. She has an elevated ferritin, low TIBC and elevated kappa light chains but no monoclonal spike on electrophoresis - all suggestive of an underlying inflammatory process of some sort. In July of 2022 she had surgery for a small bowel intussusception presumably related to her prior bariatric surgery. There is a positive family history of breast cancer, lymphoma, melanoma and colon cancer.     ROS:    Constitutional: No recent weight loss  Skin: No rash or jaundice  HENT: No change in eyesight or hearing  Cardiovascular:No chest pain or arrythmia  Respiratory:No cough or SOB  GI:No nausea, vomiting, diarrhea, constipation  :No dysuria or frequency  Musculoskeletal:No bone or joint pain  Neuro:No sx's of neuropathy  Psych: No complaints    PMH:      Allergies   Allergen Reactions    Moxifloxacin Unspecified       Past Medical History:   Diagnosis Date    Acute pansinusitis 11/25/2015    Fatigue 10/13/2014    Hemorrhagic disorder (HCC) 1975?    Impacted cerumen of both ears 10/13/2014    Lymphedema 01/28/2011    Neck pain, musculoskeletal 11/25/2015    Reactive depression 11/12/2014    S/P gastric bypass 02/04/2010    S/P tonsillectomy and adenoidectomy         Past Surgical History:   Procedure Laterality Date    BOWEL RESECTION N/A 7/12/2022    Procedure: LAPAROSCOPIC SMALL BOWEL RESECTION WITH  ANASTAMOSIS;  Surgeon: John H Ganser, M.D.;  Location: SURGERY [facility];  Service: General    ABDOMINOPLASTY      following weight loss    CHOLECYSTECTOMY      GASTRIC RESECTION      duodenal switch    PRIMARY C SECTION      TONSILLECTOMY AND ADENOIDECTOMY      US-CYST ASPIRATION-BREAST INITIAL          Medications:    Current Outpatient Medications on File Prior to Encounter   Medication Sig Dispense Refill    COVID-19 mRNA Vac-Lina,Pfizer, (COMIRNATY) 30 MCG/0.3ML Suspension Prefilled Syringe injection Inject  into the shoulder, thigh, or buttocks. 0.3 mL 0    SYNTHROID 88 MCG Tab Take 1 Tablet by mouth every morning on an empty stomach. 90 Tablet 3    buPROPion (WELLBUTRIN XL) 150 MG XL tablet Take 1 Tablet by mouth every morning. 90 Tablet 3    topiramate (TOPAMAX) 25 MG Tab Take 1 Tablet by mouth 2 times a day for 90 days for migraine. 180 Tablet 3    sumatriptan (IMITREX) 100 MG tablet Take 1 Tablet by mouth one time as needed for Migraine for up to 1 dose. 10 Tablet 3    metaxalone (SKELAXIN) 800 MG Tab Take 1 Tablet by mouth 3 times a day. 45 Tablet 1    fluconazole (DIFLUCAN) 150 MG tablet Take 1 tablet by mouth once for one day. 1 Tablet 1    St Johns Wort 300 MG Cap Take 1 Cap by mouth 2 Times a Day.      5-Hydroxytryptophan (5-HTP PO) Take 200 mg by mouth 2 times a day.      levonorgestrel (MIRENA) 52 mg (20 mcg/24 hr) IUD 1 Each by Intrauterine route one time. Every 5 years      Probiotic Product (PROBIOTIC PO) Take 1 Tab by mouth 3 times a day.      Ferrous Fumarate 325 (106 FE) MG TABS Take 325 mg by mouth 3 times a day.      MULTI VIT/FL PO Take 1 Tablet by mouth every day.      CALCIUM CITRATE 250 MG PO TABS Take 250 mg by mouth 4 times a day.      VITAMIN A Take 25,000 Int'l Units by mouth every day.      VITAMIN C 500 MG PO TABS Take 1,000 mg by mouth 2 Times a Day.      GLUCOSAMINE CHONDRO COMPLEX PO Take 1 Tablet by mouth 2 times a day.       No current facility-administered medications on  "file prior to encounter.       Social History     Tobacco Use    Smoking status: Never    Smokeless tobacco: Never   Substance Use Topics    Alcohol use: Yes     Alcohol/week: 1.8 - 2.4 oz     Types: 3 - 4 Standard drinks or equivalent per week     Comment: occasionally        Family History   Problem Relation Age of Onset    Hypertension Mother     Cancer Mother         breast cancer    Arthritis Mother     Breast Cancer Mother     Cancer Father         lymphoma ,BCC,colon cancer 73    Hypertension Father     Alcohol/Drug Father     Colorectal Cancer Father     Heart Disease Father     Alcohol abuse Father     Hypertension Sister     Hyperlipidemia Sister     Hypertension Brother     Hyperlipidemia Brother     Cancer Maternal Aunt         breast    Breast Cancer Maternal Aunt     Bilateral Breast Cancer Maternal Aunt     Colorectal Cancer Maternal Uncle     Colorectal Cancer Paternal Uncle     Alcohol/Drug Maternal Grandmother         prescription medications, renal failure    Cancer Maternal Grandmother     Stroke Paternal Grandfather     Breast Cancer Maternal Cousin     Ovarian Cancer Neg Hx     Tubal Cancer Neg Hx     Peritoneal Cancer Neg Hx         Objective    Vitals:    BP 90/70 (BP Location: Right arm, Patient Position: Sitting, BP Cuff Size: Adult)   Pulse 75   Temp 36.5 °C (97.7 °F) (Temporal)   Ht 1.6 m (5' 3\")   Wt 75 kg (165 lb 5.5 oz)   SpO2 99%   BMI 29.29 kg/m²     Physical Exam:    Appears well-developed and well-nourished. No distress.    Head -  Normocephalic .   Eyes - Pupils are equal. Conjunctivae normal. No scleral icterus.   Ears - normal hearing  Mouth - Throat: Oropharynx is clear and moist. No oropharyngeal exudate  Neck - Neck supple. No thyromegaly  Cardiovascular - Normal rate, regular rhythm, normal heart sounds and intact distal pulses. No  gallop, murmur or rub  Pulmonary - Normal breath sounds.  No wheeze, rales or rhonchi  Breast - symmetrical. No mass on " indentation.  Abdominal -Soft. No distension, tenderness, organomegaly or mass. S/p tummy tuck.   Extremities-  No edema or tenderness.    Nodes - No submental, submandibular, preauricular, cervical, axillary  adenopathy.. There is a small, palpable right inguinal node  Neurological -   Alert and oriented.  Skin - Skin is warm and dry. No rash noted. Not diaphoretic. No erythema. No pallor. No jaundice   Psychiatric -  Normal mood and affect.  Labs:       Latest Reference Range & Units 12/23/23 08:34   Immunoglobulin A 68 - 408 mg/dL 192   Immunoglobulin G 768 - 1632 mg/dL 1124   Immunoglobulin M 35 - 263 mg/dL 326 (H)      Latest Reference Range & Units 12/23/23 08:34   Free Kappa Light Chains 3.30 - 19.40 mg/L 32.48 (H)   Free Lambda Light Chains 5.71 - 26.30 mg/L 23.52   Kappa-Lambda Ratio 0.26 - 1.65  1.38   Comment: Normal SPEP pattern. JACINDA gel shows a normal pattern; no monoclonal  proteins seen.    Latest Reference Range & Units 10/21/20 06:11 12/23/23 08:17   Ferritin 10.0 - 291.0 ng/mL 243.0 625.0 (H)      Latest Reference Range & Units 10/21/20 06:11 11/13/23 06:45   Iron 40 - 170 ug/dL 117 115   Total Iron Binding 250 - 450 ug/dL 222 (L) 214 (L)   % Saturation 15 - 55 % 53 54      Latest Reference Range & Units 11/13/23 06:45 12/23/23 08:34   WBC 4.8 - 10.8 K/uL 4.0 (L) 5.0   RBC 4.20 - 5.40 M/uL 4.00 (L) 4.26   Hemoglobin 12.0 - 16.0 g/dL 13.5 14.4   Hematocrit 37.0 - 47.0 % 41.8 43.1   MCV 81.4 - 97.8 fL 104.5 (H) 101.2 (H)   MCH 27.0 - 33.0 pg 33.8 (H) 33.8 (H)   MCHC 32.2 - 35.5 g/dL 32.3 33.4   RDW 35.9 - 50.0 fL 49.5 50.6 (H)   Platelet Count 164 - 446 K/uL 142 (L) 206      Latest Reference Range & Units 12/23/23 08:34   Reticulocyte Count 0.8 - 2.6 % 1.7   Retic, Absolute 0.04 - 0.12 M/uL 0.07      Latest Reference Range & Units 12/23/23 08:34   LDH Total 107 - 266 U/L 178      Latest Reference Range & Units 11/13/23 06:45   Vitamin B12 -True Cobalamin 211 - 911 pg/mL 873      Latest Reference  Range & Units 11/13/23 06:45   Folate -Folic Acid >4.0 ng/mL 6.6     Assessment  No evidence of malignancy,. Cause of macrocytosis could be the kappa light chains but it is long standing  Imp:    Visit Diagnosis:    1. Elevated serum immunoglobulin free light chains  Referral to Rheumatology      2. History of bariatric surgery        3. Macrocytosis  Referral to Rheumatology        Plan:  Suggest a rheumatology consultation. No further hematologic work up seems warranted at this time.     Brian Cheek M.D.

## 2024-01-18 PROCEDURE — RXMED WILLOW AMBULATORY MEDICATION CHARGE: Performed by: BEHAVIOR ANALYST

## 2024-01-23 ENCOUNTER — PHARMACY VISIT (OUTPATIENT)
Dept: PHARMACY | Facility: MEDICAL CENTER | Age: 54
End: 2024-01-23
Payer: COMMERCIAL

## 2024-02-13 ENCOUNTER — HOSPITAL ENCOUNTER (OUTPATIENT)
Dept: LAB | Facility: MEDICAL CENTER | Age: 54
End: 2024-02-13
Attending: OBSTETRICS & GYNECOLOGY
Payer: COMMERCIAL

## 2024-02-13 PROCEDURE — RXMED WILLOW AMBULATORY MEDICATION CHARGE: Performed by: OBSTETRICS & GYNECOLOGY

## 2024-02-13 PROCEDURE — 83001 ASSAY OF GONADOTROPIN (FSH): CPT

## 2024-02-13 PROCEDURE — 82670 ASSAY OF TOTAL ESTRADIOL: CPT

## 2024-02-13 PROCEDURE — 83002 ASSAY OF GONADOTROPIN (LH): CPT

## 2024-02-13 ASSESSMENT — RHEUMATOLOGY NEW PATIENT QUESTIONNAIRE
CHARACTERISTIC: SAME WITH ACTIVITY
JOINT PAIN: SAME WITH ACTIVITY
LYMPH NODE SWELLING: GROIN
PELVIC PAIN: Y
VISION LOSS: Y
IRREGULAR HEARTBEATS: Y
MUSCLE PAIN: Y
HAIR SHEDDING: Y
DIFFICULTY SWALLOWING: Y
JOINT INSTABILITY: Y
DRY EYES: Y
ANXIETY: Y
DIZZINESS: Y
HEADACHES: Y
BLURRY VISION: Y
HAIR LOSS WITH BALD SPOTS: Y
DURATION: >1 HOUR
FATIGUE: Y
VERTIGO: Y
HEARTBURN: Y
STICKING IN THROAT: Y
BODY ACHES: Y
SINUS PAIN: Y

## 2024-02-14 ENCOUNTER — OFFICE VISIT (OUTPATIENT)
Dept: RHEUMATOLOGY | Facility: MEDICAL CENTER | Age: 54
End: 2024-02-14
Attending: STUDENT IN AN ORGANIZED HEALTH CARE EDUCATION/TRAINING PROGRAM
Payer: COMMERCIAL

## 2024-02-14 ENCOUNTER — PHARMACY VISIT (OUTPATIENT)
Dept: PHARMACY | Facility: MEDICAL CENTER | Age: 54
End: 2024-02-14
Payer: COMMERCIAL

## 2024-02-14 VITALS
SYSTOLIC BLOOD PRESSURE: 102 MMHG | HEART RATE: 78 BPM | DIASTOLIC BLOOD PRESSURE: 60 MMHG | WEIGHT: 168 LBS | BODY MASS INDEX: 29.77 KG/M2 | HEIGHT: 63 IN | OXYGEN SATURATION: 99 % | TEMPERATURE: 97.8 F | RESPIRATION RATE: 14 BRPM

## 2024-02-14 DIAGNOSIS — R79.89 ELEVATED FERRITIN: Primary | ICD-10-CM

## 2024-02-14 DIAGNOSIS — R77.8 LOW SERUM COMPLEMENT C3: ICD-10-CM

## 2024-02-14 DIAGNOSIS — M25.50 ARTHRALGIA OF MULTIPLE JOINTS: ICD-10-CM

## 2024-02-14 DIAGNOSIS — N96 HISTORY OF MULTIPLE MISCARRIAGES: ICD-10-CM

## 2024-02-14 LAB
ESTRADIOL SERPL-MCNC: 45 PG/ML
FSH SERPL-ACNC: 12.1 MIU/ML
LH SERPL-ACNC: 4 IU/L

## 2024-02-14 PROCEDURE — 99204 OFFICE O/P NEW MOD 45 MIN: CPT | Performed by: STUDENT IN AN ORGANIZED HEALTH CARE EDUCATION/TRAINING PROGRAM

## 2024-02-14 PROCEDURE — 3074F SYST BP LT 130 MM HG: CPT | Performed by: STUDENT IN AN ORGANIZED HEALTH CARE EDUCATION/TRAINING PROGRAM

## 2024-02-14 PROCEDURE — 99212 OFFICE O/P EST SF 10 MIN: CPT | Performed by: STUDENT IN AN ORGANIZED HEALTH CARE EDUCATION/TRAINING PROGRAM

## 2024-02-14 PROCEDURE — 3078F DIAST BP <80 MM HG: CPT | Performed by: STUDENT IN AN ORGANIZED HEALTH CARE EDUCATION/TRAINING PROGRAM

## 2024-02-14 ASSESSMENT — FIBROSIS 4 INDEX: FIB4 SCORE: 1.31

## 2024-02-14 ASSESSMENT — PATIENT HEALTH QUESTIONNAIRE - PHQ9: CLINICAL INTERPRETATION OF PHQ2 SCORE: 0

## 2024-02-14 NOTE — PROGRESS NOTES
[facility] RHEUMATOLOGY  [address], [address], [place] [address]  Phone: [phone] ? Fax: [phone]  [facility]/Health-Services/Rheumatology    NEW PATIENT VISIT NOTE      DATE OF SERVICE: 02/14/2024         Subjective     REFERRING PRACTITIONER:  Brian Cheek M.D.  [address]  [place] [address]    PATIENT IDENTIFICATION:  Regina Schmidt  [address] [address]    YOB: 1970    MEDICAL RECORD NUMBER: [id number]         CHIEF COMPLAINT:   Chief Complaint   Patient presents with    New Patient     Abnormal labs       HISTORY OF PRESENT ILLNESS:  Regina Schmidt is a 53 y.o. female with pertinent history notable for hypothyroidism, iron deficiency anemia, macrocytosis, adhesive capsulitis of left shoulder, migraine headaches and anxiety, who presents for rheumatologic evaluation in the setting of abnormal labs.     Patient reports history of prolonged bleeding since she was a young child.  This was discovered after a tonsillectomy surgery where she bled for awhile.  She also has a history of heavy bleeding treated with IUD.  She had an extensive hematologic workup at [facility] without a definitive diagnosis.  She has chronic widespread joint pains most notably along the neck which causes quite a bit of stiffness in the mornings and throughout the day.  She has pain along the base of the thumbs mostly with repetitive activity such as typing, opening things etc.  She developed mild right knee pain after walking for prolonged period of time while on vacation.  Denies any joint swelling.  Notes ~1-1.5 hours of morning stiffness mainly in the low back and neck.  Stiffness in the hips and knees can last probably ~30 minutes as soon as she gets out of a hot shower.  As the day progresses, her stiffness gets worse despite activity.  She reports dry eyes with gritty sensation, uses eyedrops 2-3 times daily.  Denies episodes of red painful  photophobic eyes.  She has some dry mouth with occasional dry cough mostly at night.  No nocturnal disturbance or change in taste.  She has had occasional random rashes on the neck and anterior chest which goes away within hours sometimes 2 days, but no photosensitive or malar rash.  She has intermittent discomfort when swallowing which occurs with spicy or fatty foods or meats.  Denies history of nonscarring alopecia but does have some thinning hair.    Reports history of 7 miscarriages that occurred on average 10-23 weeks of gestation.  She has 1 child.  Denies history of strokes, DVTs or PEs.  Denies history of nasal/oral/genital ulcerations, muscle weakness, skin thickening, or Raynaud's phenomenon.    Denies any preceding illness especially prior to labs in 2023.    On ROS, reports fatigue especially after long day at work.  Denies daily fevers that occur with rashes, unintentional weight loss, pleuritic chest pains, dyspnea on exertion, or abdominal pain with hematochezia.    History of bariatric surgery 20 years ago and BEAU previously treated with iron infusions, now on oral iron supplementation. History of bleeding intraoperatively during bypass surgery requiring blood transfusions (x 8). She had small bowel intussusception likely related to previous bariatric surgery in 2022.     Reports other aspects of symptoms and medical history as noted on the questionnaire below or scanned under media tab.    Pertinent treatments: Ferrous fumarate   Pertinent positive labs: 2023; ferritin 625, elevated free kappa light chains (32), 2023 WBC with mild leukopenia at 4.0, platelet count 142, 2023; C3 mildly decreased at 85   Pertinent negative labs: 2023; LDH, normal kappa/lambda ratio, .2, 2023; TSH, 2023; C4, ESR/CRP, RF, MILTON (with reflexive profile), 10/2022; ferritin  Pertinent imagin/2021 MRI shoulder: Unremarkable  3/2021 MRI cervical spine: Small posterior broad-based disc  protrusions at C5-T1 without central canal stenosis or neuroforaminal narrowing.  Interosseous lesion most consistent with intraosseous hemangioma associated with the C2 vertebral body.  9/2018 bilateral hip x-rays with pelvis: No acute hip fracture or dislocations.  Minimal hip joint spurs    [facility] New Patient History Form    2/13/2024  5:41 PM PST - Filed by Patient   Demographic Information   Marital Status:    Occupation: RN   Highest Level of Education: BSN   MAIN REASON FOR VISIT Per request of Dr Cheek   HISTORY OF PRESENT ILLNESS   Date of symptom onset:    Preceding incident/ailment:    Describe/list your symptoms:    Exacerbating factors:    Alleviating factors:    Helpful medications:    Ineffective medications:    Severity of pain (scale of 1-10):    Personal/emotional stressors:    Todd All The Areas Of Pain    REVIEW OF SYMPTOMS    General   Fevers    Chills    Night sweats    Malaise    Fatigue Yes   Unintentional weight loss    Musculoskeletal   Joint pain Same with activity   Morning stiffness duration >1 hour   Morning stiffness characteristic Same with activity   Joint swelling    Joint instability Yes   Tendon pain    Muscle pain Yes   Body aches Yes   Dermatologic   Hair loss with bald spots Yes   Hair shedding Yes   Skin thickening    Skin plaques    Sunlight-induced skin rash    Cold-induced color changes (white, purple, red on rewarming)    Neurologic/Psychiatric   Weakness    Spasms    Tingling    Burning    Numbness    Insomnia    Anxiety Yes   Depression    Head/Eyes   Headaches Yes   Temple pain    Dizziness Yes   Dry eyes Yes   Eye pain    Eye redness    Blurry vision Yes   Vision loss Yes   Ears/Nose   Ear pain    Ringing in ears    Vertigo Yes   Hearing loss    Nasal ulcers    Nosebleeds    Sinus pain Yes   Nasal congestion    Snoring    Mouth/Throat   Oral ulcers    Bleeding gums    Dry mouth    Cavities    Sore throat    Sticking in throat Yes   Difficulty speaking     Neck/Lymphatics   Thyroid pain    Thyroid swelling    Lymph node swelling Groin   Cardiac/Respiratory   Chest pain with breathing    Dry cough    Cough with bloody phlegm    Shortness of breath    Fast heartbeats    Irregular heartbeats Yes   Gastrointestinal   Nausea    Vomiting    Difficulty swallowing Yes   Heartburn Yes   Abdominal pain    Bloody stool    Mucus stool    Genitourinary   Pelvic pain Yes   Genital ulcers    Abnormal discharge    Burning urination    Frothy urine    Blood in urine    MEDICAL/PERSONAL HISTORY DETAILS   Current Medical Problems:    Past/Resolved Medical Problems:    Surgeries/Procedures (include month/year):    Prescription/Over-The-Counter/Herbal Medications:    Medication/Food/Material Allergies (include reactions):    Immunizations (include month/year)    Alcohol/Tobacco/Recreational Drugs:    Family Medical History (father, mother, siblings only):        REVIEW OF SYSTEMS:  Except as noted in the history above, relevant review of systems with emphasis on autoimmune rheumatic diseases was otherwise negative.      ACTIVE PROBLEM LIST:  Patient Active Problem List    Diagnosis Date Noted    Adenomatous polyp of colon 04/20/2023    Chronic right shoulder pain 03/02/2023    Healthcare maintenance 12/05/2022    Intussusception of small bowel (HCC) 07/12/2022    Bariatric surgery status 09/14/2016    Anxiety 09/14/2016    Reactive depression 11/12/2014    Fatigue 10/13/2014    Impacted cerumen of left ear 10/13/2014    Elevated parathyroid hormone 03/07/2014    Migraine headache 08/23/2012    Hypothyroid 02/09/2011    Lymphedema 01/28/2011    Vitamin D deficiency 02/04/2010    Iron deficiency anemia 02/04/2010       PAST MEDICAL HISTORY:  Past Medical History:   Diagnosis Date    Acute pansinusitis 11/25/2015    Fatigue 10/13/2014    Hemorrhagic disorder (HCC) 1975?    Impacted cerumen of both ears 10/13/2014    Lymphedema 01/28/2011    Neck pain, musculoskeletal 11/25/2015    Reactive  depression 11/12/2014    S/P gastric bypass 02/04/2010    S/P tonsillectomy and adenoidectomy        PAST SURGICAL HISTORY:  Past Surgical History:   Procedure Laterality Date    BOWEL RESECTION N/A 7/12/2022    Procedure: LAPAROSCOPIC SMALL BOWEL RESECTION WITH ANASTAMOSIS;  Surgeon: John H Ganser, M.D.;  Location: SURGERY [facility];  Service: General    ABDOMINOPLASTY      following weight loss    CHOLECYSTECTOMY      GASTRIC RESECTION      duodenal switch    PRIMARY C SECTION      TONSILLECTOMY AND ADENOIDECTOMY      US-CYST ASPIRATION-BREAST INITIAL         MEDICATIONS:  Current Outpatient Medications   Medication Sig    COVID-19 mRNA Vac-Lina,Pfizer, (COMIRNATY) 30 MCG/0.3ML Suspension Prefilled Syringe injection Inject  into the shoulder, thigh, or buttocks.    SYNTHROID 88 MCG Tab Take 1 Tablet by mouth every morning on an empty stomach.    buPROPion (WELLBUTRIN XL) 150 MG XL tablet Take 1 Tablet by mouth every morning.    topiramate (TOPAMAX) 25 MG Tab Take 1 Tablet by mouth 2 times a day for 90 days for migraine.    sumatriptan (IMITREX) 100 MG tablet Take 1 Tablet by mouth one time as needed for Migraine for up to 1 dose.    metaxalone (SKELAXIN) 800 MG Tab Take 1 Tablet by mouth 3 times a day.    St Johns Wort 300 MG Cap Take 1 Cap by mouth 2 Times a Day.    5-Hydroxytryptophan (5-HTP PO) Take 200 mg by mouth 2 times a day.    levonorgestrel (MIRENA) 52 mg (20 mcg/24 hr) IUD 1 Each by Intrauterine route one time. Every 5 years    Probiotic Product (PROBIOTIC PO) Take 1 Tab by mouth 3 times a day.    Ferrous Fumarate 325 (106 FE) MG TABS Take 325 mg by mouth 3 times a day.    MULTI VIT/FL PO Take 1 Tablet by mouth every day.    CALCIUM CITRATE 250 MG PO TABS Take 250 mg by mouth 4 times a day.    VITAMIN A Take 25,000 Int'l Units by mouth every day.    VITAMIN C 500 MG PO TABS Take 1,000 mg by mouth 2 Times a Day.    GLUCOSAMINE CHONDRO COMPLEX PO Take 1 Tablet by mouth 2 times a day.    estradiol  (CARSON) 0.05 MG/24HR PATCH BIWEEKLY Apply 1 patch to skin Transdermal Two times a Week 90 days (Patient not taking: Reported on 2/14/2024)    progesterone (PROMETRIUM) 100 MG Cap Take 1 capsule by mouth at bedtime Orally Once a day 90 days (Patient not taking: Reported on 2/14/2024)    fluconazole (DIFLUCAN) 150 MG tablet Take 1 tablet by mouth once for one day. (Patient not taking: Reported on 2/14/2024)       ALLERGIES:   Allergies   Allergen Reactions    Moxifloxacin Unspecified       IMMUNIZATIONS:  Immunization History   Administered Date(s) Administered    9VHPV VACCINE 2-3 DOSE IM (GARDASIL 9) 04/05/2023, 08/01/2023    Comirnaty (Covid-19 Vaccine, Mrna, 4880-9042 Formula) 11/09/2023    INFLUENZA TIV (IM) 01/17/2013, 09/18/2014, 10/06/2016    Influenza Seasonal Injectable - Historical Data 01/17/2013, 10/26/2013, 09/19/2014, 10/01/2015, 10/06/2016, 10/25/2017, 10/09/2018, 09/25/2019, 09/30/2020, 09/28/2021    Influenza Vaccine Quad Inj (Pf) 10/25/2017, 10/09/2018, 09/25/2019, 10/02/2020, 10/06/2022, 09/28/2023    Influenza, Unspecified - HISTORICAL DATA 09/30/2020    PFIZER WILKERSON CAP SARS-COV-2 VACCINATION (12+) 06/03/2022    PFIZER PURPLE CAP SARS-COV-2 VACCINATION (12+) 12/30/2020, 12/30/2020, 01/20/2021, 01/20/2021, 09/22/2021    Tdap Vaccine 08/23/2012    Tuberculin Skin Test 09/21/2011, 09/12/2012, 09/09/2013    Zoster Vaccine Recombinant (RZV) (SHINGRIX) 10/13/2023       SOCIAL HISTORY:   Social History     Socioeconomic History    Marital status: Single    Highest education level: Bachelor's degree (e.g., BA, AB, BS)   Tobacco Use    Smoking status: Never    Smokeless tobacco: Never   Vaping Use    Vaping Use: Never used   Substance and Sexual Activity    Alcohol use: Yes     Alcohol/week: 1.8 - 2.4 oz     Types: 3 - 4 Standard drinks or equivalent per week     Comment: occasionally    Drug use: No    Sexual activity: Yes     Partners: Male     Birth control/protection: I.U.D., Surgical     Comment:  Mirena  VAS     Social Determinants of Health     Financial Resource Strain: Low Risk  (12/5/2022)    Overall Financial Resource Strain (CARDIA)     Difficulty of Paying Living Expenses: Not hard at all   Food Insecurity: No Food Insecurity (12/5/2022)    Hunger Vital Sign     Worried About Running Out of Food in the Last Year: Never true     Ran Out of Food in the Last Year: Never true   Transportation Needs: No Transportation Needs (12/5/2022)    PRAPARE - Transportation     Lack of Transportation (Medical): No     Lack of Transportation (Non-Medical): No   Physical Activity: Sufficiently Active (12/5/2022)    Exercise Vital Sign     Days of Exercise per Week: 5 days     Minutes of Exercise per Session: 90 min   Stress: Stress Concern Present (12/5/2022)    [employer] [place] - Occupational Stress Questionnaire     Feeling of Stress : To some extent   Social Connections: Moderately Integrated (12/5/2022)    Social Connection and Isolation Panel [NHANES]     Frequency of Communication with Friends and Family: More than three times a week     Frequency of Social Gatherings with Friends and Family: More than three times a week     Attends Quaker Services: 1 to 4 times per year     Active Member of Clubs or Organizations: Yes     Attends Club or Organization Meetings: 1 to 4 times per year     Marital Status:    Housing Stability: Low Risk  (12/5/2022)    Housing Stability Vital Sign     Unable to Pay for Housing in the Last Year: No     Number of Places Lived in the Last Year: 1     Unstable Housing in the Last Year: No       FAMILY HISTORY:  Family History   Problem Relation Age of Onset    Hypertension Mother     Cancer Mother         breast cancer    Arthritis Mother     Breast Cancer Mother     Cancer Father         lymphoma ,BCC,colon cancer 73    Hypertension Father     Alcohol/Drug Father     Colorectal Cancer Father     Heart Disease Father     Alcohol abuse Father      "Hypertension Sister     Hyperlipidemia Sister     Hypertension Brother     Hyperlipidemia Brother     Cancer Maternal Aunt         breast    Breast Cancer Maternal Aunt     Bilateral Breast Cancer Maternal Aunt     Colorectal Cancer Maternal Uncle     Colorectal Cancer Paternal Uncle     Alcohol/Drug Maternal Grandmother         prescription medications, renal failure    Cancer Maternal Grandmother     Stroke Paternal Grandfather     Breast Cancer Maternal Cousin     Ovarian Cancer Neg Hx     Tubal Cancer Neg Hx     Peritoneal Cancer Neg Hx             Objective     Vital Signs: /60   Pulse 78   Temp 36.6 °C (97.8 °F) (Temporal)   Resp 14   Ht 1.6 m (5' 3\")   Wt 76.2 kg (168 lb)   SpO2 99% Body mass index is 29.76 kg/m².    General: Appears well and comfortable  Eyes: No scleral or conjunctival lesions  ENT: No apparent oral or nasal lesions  Head/Neck: No apparent scalp or neck lesions  Cardiovascular: Regular rate and rhythm; no pericardial rubs  Respiratory: Breathing quiet and unlabored; no rales or pleural rubs  Gastrointestinal: No apparent organomegaly or abdominal masses  Integumentary: No significant cutaneous lesions or dyspigmentation  Musculoskeletal: No significant joint tenderness, periarticular soft tissue swelling, warmth, erythema, or overt synovitis; no significant restriction in range of motion of joints examined  Neurologic: No focal sensory or motor deficits  Psychiatric: Mood and affect appropriate    LABORATORY RESULTS REVIEWED AND INTERPRETED BY ME:  Lab Results   Component Value Date/Time    CREACTPROT <0.30 04/20/2023 11:02 AM    SEDRATEWES 7 04/20/2023 11:02 AM     Lab Results   Component Value Date/Time    H0EUJYBVOWO 85.1 (L) 04/20/2023 11:02 AM    V9FOMORHVBK 19.0 04/20/2023 11:02 AM     Lab Results   Component Value Date/Time    RHEUMFACTN <10 04/20/2023 11:02 AM     Lab Results   Component Value Date/Time    ANTINUCAB None Detected 04/20/2023 11:02 AM     Lab Results "   Component Value Date/Time     12/23/2023 08:34 AM    IGG 1124 12/23/2023 08:34 AM     Lab Results   Component Value Date/Time    MICROSOMALA 0.5 08/23/2012 02:32 PM    TSH 1.210 04/04/2011 12:00 AM    TSHULTRASEN 3.620 11/13/2023 06:45 AM    FREEDIR 1.26 04/04/2011 12:00 AM    FREET4 1.36 10/21/2020 06:11 AM     Lab Results   Component Value Date/Time    25HYDROXY 63 11/13/2023 06:45 AM    PTHINTACT 44.0 11/13/2023 06:45 AM     Lab Results   Component Value Date/Time    FERRITIN 625.0 (H) 12/23/2023 08:17 AM    IRON 115 11/13/2023 06:45 AM    FOLATE 6.6 11/13/2023 06:45 AM     Lab Results   Component Value Date/Time    WBC 5.0 12/23/2023 08:34 AM    WBC 4.6 02/04/2011 09:12 AM    RBC 4.26 12/23/2023 08:34 AM    RBC 3.79 (L) 02/04/2011 09:12 AM    HEMOGLOBIN 14.4 12/23/2023 08:34 AM    HEMATOCRIT 43.1 12/23/2023 08:34 AM    .2 (H) 12/23/2023 08:34 AM    MCV 99 (H) 02/04/2011 09:12 AM    MCH 33.8 (H) 12/23/2023 08:34 AM    MCH 33.5 02/04/2011 09:12 AM    MCHC 33.4 12/23/2023 08:34 AM    RDW 50.6 (H) 12/23/2023 08:34 AM    RDW 13.0 02/04/2011 09:12 AM    PLATELETCT 206 12/23/2023 08:34 AM    MPV 10.7 12/23/2023 08:34 AM    NEUTS 2.96 12/23/2023 08:34 AM    NEUTS 2.5 02/04/2011 09:12 AM    LYMPHOCYTES 31.20 12/23/2023 08:34 AM    MONOCYTES 7.40 12/23/2023 08:34 AM    EOSINOPHILS 1.60 12/23/2023 08:34 AM    BASOPHILS 0.80 12/23/2023 08:34 AM     Lab Results   Component Value Date/Time    ASTSGOT 23 11/13/2023 06:45 AM    ALTSGPT 19 11/13/2023 06:45 AM    ALKPHOSPHAT 53 11/13/2023 06:45 AM    TBILIRUBIN 0.6 11/13/2023 06:45 AM    TOTPROTEIN 6.7 12/23/2023 08:34 AM    ALBUMIN 4.23 12/23/2023 08:34 AM     Lab Results   Component Value Date/Time    SODIUM 140 11/13/2023 06:45 AM    POTASSIUM 3.9 11/13/2023 06:45 AM    CHLORIDE 108 11/13/2023 06:45 AM    CO2 22 11/13/2023 06:45 AM    GLUCOSE 79 11/13/2023 06:45 AM    BUN 16 11/13/2023 06:45 AM    CREATININE 0.72 11/13/2023 06:45 AM    CREATININE 0.76  02/04/2011 09:12 AM    BUNCREATRAT 16 02/04/2011 09:12 AM    GLOMRATE >59 02/04/2011 09:12 AM    CALCIUM 8.5 11/13/2023 06:45 AM    MAGNESIUM 1.8 11/13/2023 06:45 AM       Lab Results   Component Value Date/Time    COLORURINE DK Yellow 02/26/2019 10:52 AM    SPECGRAVITY 1.006 02/26/2019 10:52 AM    PHURINE 6.0 02/26/2019 10:52 AM    GLUCOSEUR Negative 02/26/2019 10:52 AM    KETONES Negative 02/26/2019 10:52 AM    PROTEINURIN Negative 02/26/2019 10:52 AM     Lab Results   Component Value Date/Time    HEPCAB Negative 02/03/2017 02:24 PM     Lab Results   Component Value Date/Time    CHOLSTRLTOT 112 11/13/2023 06:45 AM    LDL 34 11/13/2023 06:45 AM    HDL 53 11/13/2023 06:45 AM    TRIGLYCERIDE 127 11/13/2023 06:45 AM    HBA1C 3.9 08/26/2019 06:16 AM       RADIOLOGY RESULTS REVIEWED AND INTERPRETED BY ME: See above    All relevant laboratory and imaging results reported on this note were reviewed and interpreted by me.         Assessment & Plan     Regina Schmidt is a 53 y.o. female with history as noted above whose presentation merits the following clinical impressions and recommendations:    1. Elevated ferritin  # Elevated elevated free kappa light chains   Serum protein measured on SPEP and ferritin are acute phase reactants so can be elevated acutely in inflammatory conditions that are non rheumatic. Serologies, history and exam are not suggestive of rheumatic autoimmune/autoinflammatory conditions such as Sjogren syndrome, systemic lupus, inflammatory myopathy, systemic sclerosis, or periodic autoinflammatory conditions at this time. These labs should be repeated to establish a trend.     2. Low serum complement C3, mild  Mildly decreased at 85. Low C3 is often associated with systemic lupus erythematosus, antiphospholipid syndrome, mixed cryoglobulinemia, Sjögren syndrome, and membranoproliferative glomerulonephritis (including post-streptococcal nephritis) amongst others.  History and physical does not  suggest any of these disease entities.    3. History of multiple miscarriages  No history of strokes, DVTs or PEs. Given low C3 and history of thrombocytopenia, best to rule out antiphospholipid syndrome.     4. Arthralgia of multiple joints  Presentation is compatible with biomechanical arthralgia likely secondary to early osteoarthritis especially in the hands (CMC joints). The current clinical evidence does not support the presence of an underlying autoimmune inflammatory arthritis, such as rheumatoid arthritis, spondyloarthritis, or lupus-spectrum arthritis. In any case, reasonable to undertake further investigation with the workup noted below for confirmatory, exclusionary, and risk stratification purposes.    Plan:  - LUPUS ANTICOAGULANT  - ANTICARDIOLIPIN AB IGG,IGM,IGA  - BETA-2 GLYCOPROTEIN I AB,G,A,M  - PROTEIN S FUNCTIONAL; Future  - PROTEIN C FUNCTIONAL; Future  - FERRITIN; Future  - COMPLEMENT C1Q, QUANTITATIVE  - COMPLEMENT C3; Future  - FACTOR V LEIDEN MUTATION; Future    The above assessment and plan were discussed with the patient who acknowledged understanding of the plan.    FOLLOW-UP: Return TBD.         Thank you for the opportunity to participate in the care of Regina Schmidt.    Sommer Olivo D.O.  Rheumatologist

## 2024-02-15 ENCOUNTER — HOSPITAL ENCOUNTER (OUTPATIENT)
Dept: LAB | Facility: MEDICAL CENTER | Age: 54
End: 2024-02-15
Attending: STUDENT IN AN ORGANIZED HEALTH CARE EDUCATION/TRAINING PROGRAM
Payer: COMMERCIAL

## 2024-02-15 DIAGNOSIS — R79.89 ELEVATED FERRITIN: ICD-10-CM

## 2024-02-15 DIAGNOSIS — N96 HISTORY OF MULTIPLE MISCARRIAGES: ICD-10-CM

## 2024-02-15 DIAGNOSIS — R77.8 LOW SERUM COMPLEMENT C3: ICD-10-CM

## 2024-02-15 LAB — C3 SERPL-MCNC: 69.5 MG/DL (ref 87–200)

## 2024-02-15 PROCEDURE — 85306 CLOT INHIBIT PROT S FREE: CPT

## 2024-02-15 PROCEDURE — 86160 COMPLEMENT ANTIGEN: CPT

## 2024-02-15 PROCEDURE — 82728 ASSAY OF FERRITIN: CPT

## 2024-02-15 PROCEDURE — 85520 HEPARIN ASSAY: CPT

## 2024-02-15 PROCEDURE — 85730 THROMBOPLASTIN TIME PARTIAL: CPT

## 2024-02-15 PROCEDURE — 85610 PROTHROMBIN TIME: CPT

## 2024-02-15 PROCEDURE — 81241 F5 GENE: CPT

## 2024-02-15 PROCEDURE — 86147 CARDIOLIPIN ANTIBODY EA IG: CPT

## 2024-02-15 PROCEDURE — 85613 RUSSELL VIPER VENOM DILUTED: CPT

## 2024-02-15 PROCEDURE — 85303 CLOT INHIBIT PROT C ACTIVITY: CPT

## 2024-02-15 PROCEDURE — RXMED WILLOW AMBULATORY MEDICATION CHARGE: Performed by: OBSTETRICS & GYNECOLOGY

## 2024-02-15 PROCEDURE — 36415 COLL VENOUS BLD VENIPUNCTURE: CPT

## 2024-02-15 PROCEDURE — 86146 BETA-2 GLYCOPROTEIN ANTIBODY: CPT | Mod: 91

## 2024-02-16 LAB
APTT PPP: 23.5 SEC (ref 24.7–36)
FERRITIN SERPL-MCNC: 486 NG/ML (ref 10–291)
INR PPP: 1.34 (ref 0.87–1.13)
LA PPP-IMP: ABNORMAL
LMWH PPP CHRO-ACNC: <0.1 U/ML
PROTHROMBIN TIME: 16.8 SEC (ref 12–14.6)
SCREEN DRVVT: 40.5 SEC (ref 28–48)

## 2024-02-16 NOTE — PATIENT INSTRUCTIONS
Thank you for visiting our clinic today.     Summary of your visit:    As discussed during your visit, I do not see any clinical evidence or laboratory evidence of rheumatic autoimmune/autoinflammatory disease at this time which is reassuring.     However, given history of multiple miscarriages and mildly abnormal labs (C3 and low platelet count), best to rule out antiphospholipid syndrome which is a clotting disease that can increase the risk of arterial and venous clots.     I don't think it is unreasonable to repeat some of your labs at this time which will help establish a trend.     Follow up in TBD.       AFTER VISIT INSTRUCTIONS    Below are important information to help you navigate your healthcare needs and help us serve you safely and effectively:  If laboratory tests and/or imaging studies were ordered, remember to go get them done as instructed.  If new prescriptions and/or refills were sent, remember to go pick them up from your local pharmacy, or call the specialty pharmacy to request shipment.  Always take your prescription medications exactly as prescribed unless instructed otherwise.  Note that antirheumatic drugs and steroids are immunosuppressive which means they increase your risk of infections and have multiple potential adverse effects on various organ systems in your body, though most of them are uncommon.  It is important that you are up-to-date on age-appropriate immunizations, particularly shingles and bacterial/viral pneumonia vaccines, which you can request from me or your primary care provider.  Be sure to read the drug package inserts to learn about the potential side effects of your medications before you start taking them.  If you experience any significant drug side effects, stop taking the medication and notify me promptly, and depending on the severity of the side effects, consider going to an urgent care or emergency department for immediate attention.  If there are significant  findings on your lab tests and imaging studies that warrant further action, I will notify you with explanations via [employer] or phone call, otherwise you can view them on [employer] and let me know if you have any questions.  Note that Contractually messages are typically read during office hours and may take 1-7 business days before a response depending on the urgency of the situation and how busy my clinic schedule is.  In general, Contractually messaging is for non-urgent matters that do not require immediate attention, so for urgent matters that cannot wait, you are advised to go to an urgent care.  Lastly, you are granted Contractually access to my documentation of your visit and are encouraged to read my note which details my assessment and plan for your condition.  To learn more about your condition and rheumatic diseases evaluated and treated by rheumatologists, as well as gain access to many helpful resources about these diseases, visit our website at [email or web address].

## 2024-02-17 LAB
B2 GLYCOPROT1 IGA SER-ACNC: <10 SAU
B2 GLYCOPROT1 IGG SERPL IA-ACNC: <10 SGU
B2 GLYCOPROT1 IGM SERPL IA-ACNC: 10 SMU
CARDIOLIPIN IGA SER IA-ACNC: <10 APL
CARDIOLIPIN IGG SER IA-ACNC: <10 GPL
CARDIOLIPIN IGM SER IA-ACNC: 11 MPL

## 2024-02-18 LAB
PROT C ACT/NOR PPP: 83 % (ref 83–168)
PROT S ACT/NOR PPP: 48 % (ref 57–131)

## 2024-02-19 LAB — F5 P.R506Q BLD/T QL: NEGATIVE

## 2024-02-22 ENCOUNTER — PHARMACY VISIT (OUTPATIENT)
Dept: PHARMACY | Facility: MEDICAL CENTER | Age: 54
End: 2024-02-22
Payer: COMMERCIAL

## 2024-02-22 PROCEDURE — RXMED WILLOW AMBULATORY MEDICATION CHARGE

## 2024-03-05 LAB — C1Q SERPL-MCNC: 110 UG/ML (ref 109–242)

## 2024-03-08 DIAGNOSIS — R77.8 LOW SERUM COMPLEMENT C3: ICD-10-CM

## 2024-03-08 DIAGNOSIS — R79.89 ELEVATED FERRITIN: ICD-10-CM

## 2024-03-10 ENCOUNTER — PHARMACY VISIT (OUTPATIENT)
Dept: PHARMACY | Facility: MEDICAL CENTER | Age: 54
End: 2024-03-10
Payer: COMMERCIAL

## 2024-04-08 ENCOUNTER — PHARMACY VISIT (OUTPATIENT)
Dept: PHARMACY | Facility: MEDICAL CENTER | Age: 54
End: 2024-04-08
Payer: COMMERCIAL

## 2024-04-08 ENCOUNTER — PATIENT MESSAGE (OUTPATIENT)
Dept: MEDICAL GROUP | Facility: MEDICAL CENTER | Age: 54
End: 2024-04-08
Payer: COMMERCIAL

## 2024-04-08 DIAGNOSIS — G43.909 MIGRAINE WITHOUT STATUS MIGRAINOSUS, NOT INTRACTABLE, UNSPECIFIED MIGRAINE TYPE: ICD-10-CM

## 2024-04-08 NOTE — PATIENT COMMUNICATION
Future Appointments         Provider Department [place]    5/29/2024 9:20 AM (Arrive by 9:05 AM) SEBASTIAN Hussein. [facility]

## 2024-04-11 PROCEDURE — RXMED WILLOW AMBULATORY MEDICATION CHARGE: Performed by: FAMILY MEDICINE

## 2024-04-11 RX ORDER — SUMATRIPTAN 100 MG/1
100 TABLET, FILM COATED ORAL
Qty: 10 TABLET | Refills: 0 | Status: SHIPPED | OUTPATIENT
Start: 2024-04-11

## 2024-04-13 ENCOUNTER — PHARMACY VISIT (OUTPATIENT)
Dept: PHARMACY | Facility: MEDICAL CENTER | Age: 54
End: 2024-04-13
Payer: COMMERCIAL

## 2024-04-16 PROCEDURE — RXMED WILLOW AMBULATORY MEDICATION CHARGE: Performed by: OBSTETRICS & GYNECOLOGY

## 2024-04-18 ENCOUNTER — PHARMACY VISIT (OUTPATIENT)
Dept: PHARMACY | Facility: MEDICAL CENTER | Age: 54
End: 2024-04-18
Payer: COMMERCIAL

## 2024-04-22 ENCOUNTER — HOSPITAL ENCOUNTER (OUTPATIENT)
Dept: LAB | Facility: MEDICAL CENTER | Age: 54
End: 2024-04-22
Attending: STUDENT IN AN ORGANIZED HEALTH CARE EDUCATION/TRAINING PROGRAM
Payer: COMMERCIAL

## 2024-04-22 ENCOUNTER — HOSPITAL ENCOUNTER (OUTPATIENT)
Dept: LAB | Facility: MEDICAL CENTER | Age: 54
End: 2024-04-22
Attending: OBSTETRICS & GYNECOLOGY
Payer: COMMERCIAL

## 2024-04-22 DIAGNOSIS — R79.89 ELEVATED FERRITIN: ICD-10-CM

## 2024-04-22 DIAGNOSIS — R77.8 LOW SERUM COMPLEMENT C3: ICD-10-CM

## 2024-04-22 LAB
C3 SERPL-MCNC: 70.1 MG/DL (ref 87–200)
FERRITIN SERPL-MCNC: 442 NG/ML (ref 10–291)

## 2024-04-22 PROCEDURE — 84270 ASSAY OF SEX HORMONE GLOBUL: CPT

## 2024-04-22 PROCEDURE — 84403 ASSAY OF TOTAL TESTOSTERONE: CPT

## 2024-04-22 PROCEDURE — 82728 ASSAY OF FERRITIN: CPT

## 2024-04-22 PROCEDURE — 86160 COMPLEMENT ANTIGEN: CPT

## 2024-04-22 PROCEDURE — 36415 COLL VENOUS BLD VENIPUNCTURE: CPT

## 2024-04-22 PROCEDURE — 84402 ASSAY OF FREE TESTOSTERONE: CPT

## 2024-04-27 LAB
SHBG SERPL-SCNC: 106 NMOL/L (ref 17–125)
TESTOST FREE SERPL-MCNC: 0.5 PG/ML (ref 1.1–5.8)
TESTOST SERPL-MCNC: 7 NG/DL (ref 9–55)

## 2024-05-04 PROCEDURE — RXMED WILLOW AMBULATORY MEDICATION CHARGE: Performed by: FAMILY MEDICINE

## 2024-05-04 PROCEDURE — RXMED WILLOW AMBULATORY MEDICATION CHARGE: Performed by: OBSTETRICS & GYNECOLOGY

## 2024-05-10 ENCOUNTER — PHARMACY VISIT (OUTPATIENT)
Dept: PHARMACY | Facility: MEDICAL CENTER | Age: 54
End: 2024-05-10
Payer: COMMERCIAL

## 2024-05-10 ENCOUNTER — PATIENT MESSAGE (OUTPATIENT)
Dept: MEDICAL GROUP | Facility: MEDICAL CENTER | Age: 54
End: 2024-05-10
Payer: COMMERCIAL

## 2024-05-10 DIAGNOSIS — G43.909 MIGRAINE WITHOUT STATUS MIGRAINOSUS, NOT INTRACTABLE, UNSPECIFIED MIGRAINE TYPE: ICD-10-CM

## 2024-05-13 RX ORDER — SUMATRIPTAN 100 MG/1
100 TABLET, FILM COATED ORAL
Qty: 9 TABLET | Refills: 0 | Status: SHIPPED | OUTPATIENT
Start: 2024-05-13 | End: 2024-05-30 | Stop reason: SDUPTHER

## 2024-05-22 ENCOUNTER — TELEPHONE (OUTPATIENT)
Dept: OBGYN | Facility: CLINIC | Age: 54
End: 2024-05-22
Payer: COMMERCIAL

## 2024-05-29 ENCOUNTER — APPOINTMENT (OUTPATIENT)
Dept: MEDICAL GROUP | Facility: MEDICAL CENTER | Age: 54
End: 2024-05-29
Payer: COMMERCIAL

## 2024-05-30 ENCOUNTER — OFFICE VISIT (OUTPATIENT)
Dept: MEDICAL GROUP | Facility: MEDICAL CENTER | Age: 54
End: 2024-05-30
Payer: COMMERCIAL

## 2024-05-30 VITALS
HEIGHT: 63 IN | SYSTOLIC BLOOD PRESSURE: 110 MMHG | HEART RATE: 68 BPM | DIASTOLIC BLOOD PRESSURE: 70 MMHG | OXYGEN SATURATION: 98 % | BODY MASS INDEX: 29.23 KG/M2 | WEIGHT: 165 LBS | TEMPERATURE: 98.2 F

## 2024-05-30 DIAGNOSIS — F41.9 ANXIETY: ICD-10-CM

## 2024-05-30 DIAGNOSIS — R77.8 LOW SERUM COMPLEMENT C3: ICD-10-CM

## 2024-05-30 DIAGNOSIS — E03.9 ACQUIRED HYPOTHYROIDISM: ICD-10-CM

## 2024-05-30 DIAGNOSIS — G43.909 MIGRAINE WITHOUT STATUS MIGRAINOSUS, NOT INTRACTABLE, UNSPECIFIED MIGRAINE TYPE: ICD-10-CM

## 2024-05-30 DIAGNOSIS — D50.9 IRON DEFICIENCY ANEMIA, UNSPECIFIED IRON DEFICIENCY ANEMIA TYPE: Chronic | ICD-10-CM

## 2024-05-30 PROCEDURE — RXMED WILLOW AMBULATORY MEDICATION CHARGE: Performed by: BEHAVIOR ANALYST

## 2024-05-30 RX ORDER — BUPROPION HYDROCHLORIDE 150 MG/1
150 TABLET ORAL EVERY MORNING
Qty: 90 TABLET | Refills: 3 | Status: SHIPPED | OUTPATIENT
Start: 2024-05-30

## 2024-05-30 RX ORDER — LEVOTHYROXINE SODIUM 88 MCG
88 TABLET ORAL
Qty: 90 TABLET | Refills: 3 | Status: SHIPPED | OUTPATIENT
Start: 2024-05-30

## 2024-05-30 RX ORDER — SUMATRIPTAN 100 MG/1
100 TABLET, FILM COATED ORAL
Qty: 9 TABLET | Refills: 0 | Status: SHIPPED | OUTPATIENT
Start: 2024-05-30

## 2024-05-30 ASSESSMENT — FIBROSIS 4 INDEX: FIB4 SCORE: 1.31

## 2024-05-30 NOTE — PROGRESS NOTES
Subjective:   Verbal consent was acquired by the patient to use Safety[employer]ified ambient listening note generation during this visit Yes    CC:    Chief Complaint   Patient presents with    Medication Refill    Lab Results          HISTORY OF THE PRESENT ILLNESS:   Regina presents today   History of Present Illness  The patient is a 53-year-old female here for prescription refills and to review lab work. She is accompanied by her     The patient has been on iron supplements for a duration of 20 years, which has recently resulted in an abrupt increase in her ferritin levels.  She has reduced her iron supplementation ferrous fumarate 325 mg from 3 times a day to once daily about 3 months ago.    As previously discussed, she was experiencing joint pain, fatigue, and general malaise, and frequent migraine headaches.  Her gynecologist Dr. Magana suggested that her symptoms could be indicative of menopause. Upon reviewing her lab results, Dr. Magana informed her that she was in the 6 to 12-month range of full menopause, prompting her to commence estrogen and progesterone therapy. Subsequently, her testosterone levels were checked, and she was started on testosterone therapy 3 weeks ago.  Since starting HRT her symptoms have significantly improved-including decrease in migraine headaches, and decrease in joint pain.    Also in the past 6 months, she was referred to hematology and then rheumatology for abnormal lab results.  After a thorough lab panel, rheumatology has not made any formal diagnoses but just recommended to repeat ferritin and complement proteins in approximately 3 months from last draw which was in April      Current Outpatient Medications   Medication Sig    Testosterone 20 % Cream 100 g. Once daily    sumatriptan (IMITREX) 100 MG tablet Take 1 Tablet by mouth one time as needed for Migraine for up to 1 dose.    estradiol (CARSON) 0.075 MG/24HR PATCH BIWEEKLY Apply 1 patch to skin Transdermally Two times a  "Week    estradiol (CARSON) 0.05 MG/24HR PATCH BIWEEKLY Apply 1 patch to skin Transdermal Two times a Week 90 days    progesterone (PROMETRIUM) 100 MG Cap Take 1 capsule by mouth at bedtime once a day 90 days    SYNTHROID 88 MCG Tab Take 1 Tablet by mouth every morning on an empty stomach.    buPROPion (WELLBUTRIN XL) 150 MG XL tablet Take 1 Tablet by mouth every morning.    topiramate (TOPAMAX) 25 MG Tab Take 1 Tablet by mouth 2 times a day for 90 days for migraine.    metaxalone (SKELAXIN) 800 MG Tab Take 1 Tablet by mouth 3 times a day.    St Johns Wort 300 MG Cap Take 1 Cap by mouth 2 Times a Day.    5-Hydroxytryptophan (5-HTP PO) Take 200 mg by mouth 2 times a day.    levonorgestrel (MIRENA) 52 mg (20 mcg/24 hr) IUD 1 Each by Intrauterine route one time. Every 5 years    Probiotic Product (PROBIOTIC PO) Take 1 Tab by mouth 3 times a day.    Ferrous Fumarate 325 (106 FE) MG TABS Take 325 mg by mouth every day.    MULTI VIT/FL PO Take 1 Tablet by mouth every day.    CALCIUM CITRATE 250 MG PO TABS Take 250 mg by mouth 4 times a day.    VITAMIN A Take 25,000 Int'l Units by mouth every day.    VITAMIN C 500 MG PO TABS Take 1,000 mg by mouth 2 Times a Day.    GLUCOSAMINE CHONDRO COMPLEX PO Take 1 Tablet by mouth 2 times a day.    triazolam (HALCION) 0.25 MG Tab Take 1 tablet 1 hour before your appiontment    COVID-19 mRNA Vac-Lina,Pfizer, (COMIRNATY) 30 MCG/0.3ML Suspension Prefilled Syringe injection Inject  into the shoulder, thigh, or buttocks.    fluconazole (DIFLUCAN) 150 MG tablet Take 1 tablet by mouth once for one day. (Patient not taking: Reported on 2/14/2024)        Health Maintenance: She recently completed a repeat colonoscopy which had several polyps removed.  She was advised to return in 3 years.    ROS: See HPI  ROS      Objective:     Exam: /70 (BP Location: Right arm, Patient Position: Sitting, BP Cuff Size: Adult)   Pulse 68   Temp 36.8 °C (98.2 °F) (Temporal)   Ht 1.6 m (5' 3\")   Wt 74.8 " kg (165 lb)   SpO2 98%   BMI 29.23 kg/m²   Body mass index is 29.23 kg/m².    Physical Exam  Constitutional:       Appearance: Normal appearance.   HENT:      Right Ear: Tympanic membrane and ear canal normal.      Left Ear: Tympanic membrane and ear canal normal.   Cardiovascular:      Rate and Rhythm: Normal rate and regular rhythm.   Pulmonary:      Effort: Pulmonary effort is normal.   Musculoskeletal:      Cervical back: Normal range of motion and neck supple.   Neurological:      Mental Status: She is alert.         Results  Laboratory Studies  Ferritin level was 442. Iron saturation was 54.       Assessment & Plan:     53 y.o. female with the following -     Assessment & Plan  1. Medication refill.  An iron panel, inclusive of ferritin, iron saturation, total iron binding, and complement C3 and C4, will be conducted.      1. Low serum complement C3  -Consulted with rheumatology.  They are not convinced there is any autoimmune disease.  Repeat labs in 2 months  - COMPLEMENT C3; Future  - COMPLEMENT C4; Future    2. Iron deficiency anemia, unspecified iron deficiency anemia type  -Chronic, stable and may be overmedicating.  We will recheck her iron panel and then decide if she should continue once daily iron supplements or discontinue completely.  - IRON/TOTAL IRON BIND; Future  - FERRITIN; Future    3. Migraine without status migrainosus, not intractable, unspecified migraine type  -Chronic, decrease in starting HRT.  Continue As needed  - sumatriptan (IMITREX) 100 MG tablet; Take 1 Tablet by mouth one time as needed for Migraine for up to 1 dose.  Dispense: 9 Tablet; Refill: 0    4. Acquired hypothyroidism  Chronic, controlled. She is on levothyroxine and tolerating it well. Last TSH was within normal limits. Labs as indicated. Continue levothyroxine   - SYNTHROID 88 MCG Tab; Take 1 Tablet by mouth every morning on an empty stomach.  Dispense: 90 Tablet; Refill: 3    5. Anxiety  Chronic, controlled.   Continue:  - buPROPion (WELLBUTRIN XL) 150 MG XL tablet; Take 1 Tablet by mouth every morning.  Dispense: 90 Tablet; Refill: 3      Return in about 6 months (around 11/30/2024).    Please note that this dictation was created using voice recognition software. I have made every reasonable attempt to correct obvious errors, but I expect that there are errors of grammar and possibly content that I did not discover before finalizing the note.

## 2024-06-07 ENCOUNTER — PHARMACY VISIT (OUTPATIENT)
Dept: PHARMACY | Facility: MEDICAL CENTER | Age: 54
End: 2024-06-07
Payer: COMMERCIAL

## 2024-07-09 ENCOUNTER — PATIENT MESSAGE (OUTPATIENT)
Dept: MEDICAL GROUP | Facility: MEDICAL CENTER | Age: 54
End: 2024-07-09
Payer: COMMERCIAL

## 2024-07-09 DIAGNOSIS — G43.909 MIGRAINE WITHOUT STATUS MIGRAINOSUS, NOT INTRACTABLE, UNSPECIFIED MIGRAINE TYPE: ICD-10-CM

## 2024-07-09 PROCEDURE — RXMED WILLOW AMBULATORY MEDICATION CHARGE: Performed by: OBSTETRICS & GYNECOLOGY

## 2024-07-10 PROCEDURE — RXMED WILLOW AMBULATORY MEDICATION CHARGE: Performed by: BEHAVIOR ANALYST

## 2024-07-10 RX ORDER — SULFAMETHOXAZOLE AND TRIMETHOPRIM 800; 160 MG/1; MG/1
1 TABLET ORAL 2 TIMES DAILY
Qty: 14 TABLET | Refills: 0 | Status: SHIPPED | OUTPATIENT
Start: 2024-07-10 | End: 2024-07-19

## 2024-07-11 ENCOUNTER — PATIENT MESSAGE (OUTPATIENT)
Dept: MEDICAL GROUP | Facility: MEDICAL CENTER | Age: 54
End: 2024-07-11
Payer: COMMERCIAL

## 2024-07-11 DIAGNOSIS — G43.909 MIGRAINE WITHOUT STATUS MIGRAINOSUS, NOT INTRACTABLE, UNSPECIFIED MIGRAINE TYPE: ICD-10-CM

## 2024-07-11 PROCEDURE — RXMED WILLOW AMBULATORY MEDICATION CHARGE: Performed by: OBSTETRICS & GYNECOLOGY

## 2024-07-11 RX ORDER — SUMATRIPTAN 100 MG/1
100 TABLET, FILM COATED ORAL
Qty: 12 TABLET | Refills: 3 | Status: SHIPPED | OUTPATIENT
Start: 2024-07-11 | End: 2024-07-12 | Stop reason: SDUPTHER

## 2024-07-12 ENCOUNTER — PATIENT MESSAGE (OUTPATIENT)
Dept: MEDICAL GROUP | Facility: MEDICAL CENTER | Age: 54
End: 2024-07-12
Payer: COMMERCIAL

## 2024-07-12 ENCOUNTER — PHARMACY VISIT (OUTPATIENT)
Dept: PHARMACY | Facility: MEDICAL CENTER | Age: 54
End: 2024-07-12
Payer: COMMERCIAL

## 2024-07-12 PROCEDURE — RXMED WILLOW AMBULATORY MEDICATION CHARGE: Performed by: BEHAVIOR ANALYST

## 2024-07-12 RX ORDER — SUMATRIPTAN 100 MG/1
100 TABLET, FILM COATED ORAL
Qty: 9 TABLET | Refills: 6 | Status: SHIPPED | OUTPATIENT
Start: 2024-07-12

## 2024-07-16 RX ORDER — PROGESTERONE 200 MG/1
200 CAPSULE ORAL
Qty: 45 CAPSULE | Refills: 0 | Status: CANCELLED | OUTPATIENT
Start: 2024-07-16

## 2024-07-20 ENCOUNTER — PHARMACY VISIT (OUTPATIENT)
Dept: PHARMACY | Facility: MEDICAL CENTER | Age: 54
End: 2024-07-20
Payer: COMMERCIAL

## 2024-08-05 ENCOUNTER — TELEPHONE (OUTPATIENT)
Dept: OBGYN | Facility: CLINIC | Age: 54
End: 2024-08-05
Payer: COMMERCIAL

## 2024-08-05 NOTE — TELEPHONE ENCOUNTER
Called patient and left a voicemail to have her call back to reschedule her appt on 8/29 due to a change in the schedule. HW

## 2024-08-19 ENCOUNTER — APPOINTMENT (OUTPATIENT)
Dept: OBGYN | Facility: CLINIC | Age: 54
End: 2024-08-19
Payer: COMMERCIAL

## 2024-08-29 ENCOUNTER — APPOINTMENT (OUTPATIENT)
Dept: OBGYN | Facility: CLINIC | Age: 54
End: 2024-08-29
Payer: COMMERCIAL

## 2024-09-05 PROCEDURE — RXMED WILLOW AMBULATORY MEDICATION CHARGE: Performed by: BEHAVIOR ANALYST

## 2024-09-06 ENCOUNTER — PHARMACY VISIT (OUTPATIENT)
Dept: PHARMACY | Facility: MEDICAL CENTER | Age: 54
End: 2024-09-06
Payer: COMMERCIAL

## 2024-09-10 ENCOUNTER — PATIENT MESSAGE (OUTPATIENT)
Dept: MEDICAL GROUP | Facility: MEDICAL CENTER | Age: 54
End: 2024-09-10
Payer: COMMERCIAL

## 2024-09-10 DIAGNOSIS — Z78.0 POSTMENOPAUSAL: ICD-10-CM

## 2024-09-10 DIAGNOSIS — Z79.890 HORMONE REPLACEMENT THERAPY: ICD-10-CM

## 2024-09-10 DIAGNOSIS — N95.9 POSTMENOPAUSAL SYMPTOMS: ICD-10-CM

## 2024-09-19 ENCOUNTER — APPOINTMENT (OUTPATIENT)
Dept: OBGYN | Facility: CLINIC | Age: 54
End: 2024-09-19
Payer: COMMERCIAL

## 2024-09-19 VITALS
DIASTOLIC BLOOD PRESSURE: 60 MMHG | WEIGHT: 169 LBS | SYSTOLIC BLOOD PRESSURE: 109 MMHG | BODY MASS INDEX: 29.95 KG/M2 | HEIGHT: 63 IN

## 2024-09-19 DIAGNOSIS — N95.9 MENOPAUSAL AND PERIMENOPAUSAL DISORDER: ICD-10-CM

## 2024-09-19 DIAGNOSIS — E03.9 ACQUIRED HYPOTHYROIDISM: ICD-10-CM

## 2024-09-19 DIAGNOSIS — R53.82 CHRONIC FATIGUE: ICD-10-CM

## 2024-09-19 PROCEDURE — 3078F DIAST BP <80 MM HG: CPT | Performed by: STUDENT IN AN ORGANIZED HEALTH CARE EDUCATION/TRAINING PROGRAM

## 2024-09-19 PROCEDURE — 3074F SYST BP LT 130 MM HG: CPT | Performed by: STUDENT IN AN ORGANIZED HEALTH CARE EDUCATION/TRAINING PROGRAM

## 2024-09-19 PROCEDURE — 99203 OFFICE O/P NEW LOW 30 MIN: CPT | Performed by: STUDENT IN AN ORGANIZED HEALTH CARE EDUCATION/TRAINING PROGRAM

## 2024-09-19 ASSESSMENT — FIBROSIS 4 INDEX: FIB4 SCORE: 1.31

## 2024-09-19 NOTE — PROGRESS NOTES
Patient here for annual exam  Last pap done/result: Pt states pap 2/2024 and wnl  LMP: none  BCM:none  Last mammogram, if applicable: 12/13/2023  Phone number: [phone]  Pharmacy verified

## 2024-09-20 NOTE — PROGRESS NOTES
GYN PROBLEM VISIT    CC:  New Patient       HPI: Patient is a 53 y.o.   not yet technically menopausal (LMP 10/2023), who complains of second opinion for HRT.     Pt was seen by a provider recently, who was not her regular provider (regular past provider no longer at that practice) to follow up on hormone testing and HRT adjustment. That provider declined to prescribe testosterone however her previous provider was planning on following up and prescribing testosterone after labs were completed.     Main complaints were/are arthralgia, fatigue, weight gain, decreased libido, hot flashes though hot flashes are less bothersome.   The patient was started on HRT in 2024 with estrogen and progesterone transdermal/oral. She also has a Mirena IUD in place.  She feels like there has been some improvement in her symptoms but the fatigue, weight gain and distressing decreased libido persists.     Of note. She has been following with Rheumatology and Hematology for abnormal labs (increased ferritin and MCV without anemia and abnormal C3 testing) however they cannot find a source. Repeat testing is coming up. Patient also has hypothyroid and has been stable on her current dose of Synthroid 88 mcg for several years. Last TSH about 9 months ago.     Patient does not have migraines, HTN, h/o stroke or blood clots, no liver disease. She does have a family h/o of breast cancer in her mother however it was BRCA neg. Last mammo is UTD and WNL.      ROS:   General: denies fever / chills  HEENT: denies sore throat:  CV: denies chest pain:  Repiratory: denies shortness of breath  GI: denies abdominal pain  : denies dysuria:    PFSH:  Past Gynecological History  Patient's last menstrual period was No LMP recorded. Patient has had an implant.  BC or HRT: HRT and Mirena placed in 2018  LMP 10/2023  Sexually active: yes  Male or female partners or both: male, monogamous,  of 31 years  History of sexually transmitted  infections: no  Last pap: 2/2024, NILM, HPV neg  Last Mammo: 12/2023  Last colonoscopy: 5/2024, polyps, will return  Past Surgical History:   Procedure Laterality Date    BOWEL RESECTION N/A 7/12/2022    Procedure: LAPAROSCOPIC SMALL BOWEL RESECTION WITH ANASTAMOSIS;  Surgeon: John H Ganser, M.D.;  Location: SURGERY [facility];  Service: General    ABDOMINOPLASTY      following weight loss    CHOLECYSTECTOMY      GASTRIC RESECTION      duodenal switch    PRIMARY C SECTION      TONSILLECTOMY AND ADENOIDECTOMY      US-CYST ASPIRATION-BREAST INITIAL       Past Medical History:   Diagnosis Date    Acute pansinusitis 11/25/2015    Fatigue 10/13/2014    Hemorrhagic disorder (HCC) 1975?    Impacted cerumen of both ears 10/13/2014    Lymphedema 01/28/2011    Neck pain, musculoskeletal 11/25/2015    Reactive depression 11/12/2014    S/P gastric bypass 02/04/2010    S/P tonsillectomy and adenoidectomy        Social History     Tobacco Use    Smoking status: Never    Smokeless tobacco: Never   Vaping Use    Vaping status: Never Used   Substance Use Topics    Alcohol use: Yes     Alcohol/week: 1.8 - 2.4 oz     Types: 3 - 4 Standard drinks or equivalent per week     Comment: occasionally    Drug use: No       Social History     Substance and Sexual Activity   Sexual Activity Yes    Partners: Male    Birth control/protection: I.U.D., Male Sterilization    Comment: Mirena  VAS        ALLERGIES / REACTIONS:  Allergies   Allergen Reactions    Moxifloxacin Unspecified        Family History   Problem Relation Age of Onset    Hypertension Mother     Cancer Mother         breast cancer    Arthritis Mother     Breast Cancer Mother     Cancer Father         lymphoma ,BCC,colon cancer 73    Hypertension Father     Alcohol/Drug Father     Colorectal Cancer Father     Heart Disease Father     Alcohol abuse Father     Hypertension Sister     Hyperlipidemia Sister     Hypertension Brother     Hyperlipidemia Brother     Cancer Maternal  "Aunt         breast    Breast Cancer Maternal Aunt     Bilateral Breast Cancer Maternal Aunt     Colorectal Cancer Maternal Uncle     Colorectal Cancer Paternal Uncle     Alcohol/Drug Maternal Grandmother         prescription medications, renal failure    Cancer Maternal Grandmother     Stroke Paternal Grandfather     Breast Cancer Maternal Cousin     Ovarian Cancer Neg Hx     Tubal Cancer Neg Hx     Peritoneal Cancer Neg Hx             PHYSICAL EXAMINATION:  Vital Signs:   Vitals:    24 0836   BP: 109/60   BP Location: Left arm   Patient Position: Sitting   BP Cuff Size: Large adult   Weight: 169 lb   Height: 5' 3\"     Body mass index is 29.94 kg/m².    Gen: appears well, NAD  Respiratory: normal effort  Abdomen: protuberant  Pelvic Exam: Deferred     Latest Reference Range & Units 23 08:17 23 08:34 24 10:15 02/15/24 11:50 24 07:24 24 07:25   Hemoglobin 12.0 - 16.0 g/dL  14.4       Hematocrit 37.0 - 47.0 %  43.1       MCV 81.4 - 97.8 fL  101.2 (H)       C3 Complement 87.0 - 200.0 mg/dL    69.5 (L) 70.1 (L)    Ferritin 10.0 - 291.0 ng/mL 625.0 (H)   486.0 (H) 442.0 (H)    Immunoglobulin M 35 - 263 mg/dL  326 (H)       Estradiol-E2 pg/mL   45.0      Follicle Stimulating Hormone mIU/mL   12.1      Luteinizing Hormone IU/L   4.0      Sex Hormone Bind Globulin 17 - 125 nmol/L      106   Testosterone Fr LCMS 1.1 - 5.8 pg/mL      0.5 (L)   Testosterone,Total 9 - 55 ng/dL      7 (L)   (H): Data is abnormally high  (L): Data is abnormally low    ASSESSMENT AND PLAN:  Problem List Items Addressed This Visit       Hypothyroid (Chronic)    Relevant Orders    TSH    Fatigue    Relevant Orders    TSH     Other Visit Diagnoses       Menopausal and perimenopausal disorder            53 y.o.  perimenopausal female with menopausal symptoms previously started on HRT with some but not satisfactory improvement. Particularly concerning is the sex drive, fatigue and bloating.     I discussed " even though her testosterone is low, the values themselves are not something we treat with a goal to get to a certain number however symptoms are something we treat with a goal to improve. Similar to Estrogen HRT, there is no number we try to achieve however we treat symptoms with a goal for improvement.     I recommend the following:   -stop oral progesterone because she has an progesterone-eluding IUD. Oral progesterone can have some of the side effects she is currently experiencing.   -Her IUD will need to be replaced in 2/2026 if we continue to use it for endometrial protection during HRT.   -TSH to see if she is subtherapeutic. Hypothyroid can also have some of hte same symptoms she is currently experienceing  -f/u in 4 weeks to assess current symptoms and review TSH lab  -if she is not having desired improvement, will start testosterone and consider increased in transdermal estrogen if needed    Sanjuanita Martinez DO, FACOG  [facility]

## 2024-10-02 PROCEDURE — RXMED WILLOW AMBULATORY MEDICATION CHARGE: Performed by: BEHAVIOR ANALYST

## 2024-10-11 ENCOUNTER — HOSPITAL ENCOUNTER (OUTPATIENT)
Dept: LAB | Facility: MEDICAL CENTER | Age: 54
End: 2024-10-11
Attending: BEHAVIOR ANALYST
Payer: COMMERCIAL

## 2024-10-11 ENCOUNTER — HOSPITAL ENCOUNTER (OUTPATIENT)
Dept: LAB | Facility: MEDICAL CENTER | Age: 54
End: 2024-10-11
Attending: STUDENT IN AN ORGANIZED HEALTH CARE EDUCATION/TRAINING PROGRAM
Payer: COMMERCIAL

## 2024-10-11 DIAGNOSIS — R77.8 LOW SERUM COMPLEMENT C3: ICD-10-CM

## 2024-10-11 DIAGNOSIS — R53.82 CHRONIC FATIGUE: ICD-10-CM

## 2024-10-11 DIAGNOSIS — D50.9 IRON DEFICIENCY ANEMIA, UNSPECIFIED IRON DEFICIENCY ANEMIA TYPE: Chronic | ICD-10-CM

## 2024-10-11 DIAGNOSIS — E03.9 ACQUIRED HYPOTHYROIDISM: ICD-10-CM

## 2024-10-11 LAB
C3 SERPL-MCNC: 79.9 MG/DL (ref 87–200)
C4 SERPL-MCNC: 15.4 MG/DL (ref 19–52)
FERRITIN SERPL-MCNC: 386 NG/ML (ref 10–291)
IRON SATN MFR SERPL: 27 % (ref 15–55)
IRON SERPL-MCNC: 67 UG/DL (ref 40–170)
TIBC SERPL-MCNC: 250 UG/DL (ref 250–450)
TSH SERPL-ACNC: 2.52 UIU/ML (ref 0.35–5.5)
UIBC SERPL-MCNC: 183 UG/DL (ref 110–370)

## 2024-10-11 PROCEDURE — 36415 COLL VENOUS BLD VENIPUNCTURE: CPT

## 2024-10-11 PROCEDURE — 83540 ASSAY OF IRON: CPT

## 2024-10-11 PROCEDURE — 83550 IRON BINDING TEST: CPT

## 2024-10-11 PROCEDURE — 86160 COMPLEMENT ANTIGEN: CPT

## 2024-10-11 PROCEDURE — 84443 ASSAY THYROID STIM HORMONE: CPT

## 2024-10-11 PROCEDURE — 82728 ASSAY OF FERRITIN: CPT

## 2024-10-12 ENCOUNTER — PHARMACY VISIT (OUTPATIENT)
Dept: PHARMACY | Facility: MEDICAL CENTER | Age: 54
End: 2024-10-12
Payer: COMMERCIAL

## 2024-10-18 DIAGNOSIS — D84.1 HYPOCOMPLEMENTEMIA (HCC): ICD-10-CM

## 2024-10-23 ENCOUNTER — APPOINTMENT (OUTPATIENT)
Dept: OBGYN | Facility: CLINIC | Age: 54
End: 2024-10-23
Payer: COMMERCIAL

## 2024-10-23 ENCOUNTER — PHARMACY VISIT (OUTPATIENT)
Dept: PHARMACY | Facility: MEDICAL CENTER | Age: 54
End: 2024-10-23
Payer: COMMERCIAL

## 2024-10-23 VITALS
HEIGHT: 63 IN | WEIGHT: 165.9 LBS | DIASTOLIC BLOOD PRESSURE: 64 MMHG | BODY MASS INDEX: 29.39 KG/M2 | SYSTOLIC BLOOD PRESSURE: 103 MMHG

## 2024-10-23 DIAGNOSIS — N95.1 VASOMOTOR SYMPTOMS DUE TO MENOPAUSE: ICD-10-CM

## 2024-10-23 PROCEDURE — 99213 OFFICE O/P EST LOW 20 MIN: CPT | Performed by: STUDENT IN AN ORGANIZED HEALTH CARE EDUCATION/TRAINING PROGRAM

## 2024-10-23 PROCEDURE — RXOTC WILLOW AMBULATORY OTC CHARGE

## 2024-10-23 PROCEDURE — 3078F DIAST BP <80 MM HG: CPT | Performed by: STUDENT IN AN ORGANIZED HEALTH CARE EDUCATION/TRAINING PROGRAM

## 2024-10-23 PROCEDURE — 3074F SYST BP LT 130 MM HG: CPT | Performed by: STUDENT IN AN ORGANIZED HEALTH CARE EDUCATION/TRAINING PROGRAM

## 2024-10-23 PROCEDURE — RXMED WILLOW AMBULATORY MEDICATION CHARGE: Performed by: STUDENT IN AN ORGANIZED HEALTH CARE EDUCATION/TRAINING PROGRAM

## 2024-10-23 RX ORDER — ESTRADIOL 0.1 MG/D
1 PATCH TRANSDERMAL
Qty: 12 PATCH | Refills: 4 | Status: SHIPPED | OUTPATIENT
Start: 2024-10-23

## 2024-10-23 ASSESSMENT — FIBROSIS 4 INDEX: FIB4 SCORE: 1.31

## 2024-10-30 ENCOUNTER — HOSPITAL ENCOUNTER (OUTPATIENT)
Dept: LAB | Facility: MEDICAL CENTER | Age: 54
End: 2024-10-30
Attending: STUDENT IN AN ORGANIZED HEALTH CARE EDUCATION/TRAINING PROGRAM
Payer: COMMERCIAL

## 2024-10-30 DIAGNOSIS — D84.1 HYPOCOMPLEMENTEMIA (HCC): ICD-10-CM

## 2024-10-30 LAB
ALBUMIN SERPL BCP-MCNC: 4.1 G/DL (ref 3.2–4.9)
ALBUMIN/GLOB SERPL: 1.7 G/DL
ALP SERPL-CCNC: 74 U/L (ref 30–99)
ALT SERPL-CCNC: 26 U/L (ref 2–50)
ANION GAP SERPL CALC-SCNC: 9 MMOL/L (ref 7–16)
AST SERPL-CCNC: 26 U/L (ref 12–45)
BASOPHILS # BLD AUTO: 0.7 % (ref 0–1.8)
BASOPHILS # BLD: 0.03 K/UL (ref 0–0.12)
BILIRUB SERPL-MCNC: 0.8 MG/DL (ref 0.1–1.5)
BUN SERPL-MCNC: 14 MG/DL (ref 8–22)
CALCIUM ALBUM COR SERPL-MCNC: 9.2 MG/DL (ref 8.5–10.5)
CALCIUM SERPL-MCNC: 9.3 MG/DL (ref 8.5–10.5)
CHLORIDE SERPL-SCNC: 104 MMOL/L (ref 96–112)
CO2 SERPL-SCNC: 27 MMOL/L (ref 20–33)
CREAT SERPL-MCNC: 0.8 MG/DL (ref 0.5–1.4)
CRP SERPL HS-MCNC: <0.3 MG/DL (ref 0–0.75)
EOSINOPHIL # BLD AUTO: 0.1 K/UL (ref 0–0.51)
EOSINOPHIL NFR BLD: 2.2 % (ref 0–6.9)
ERYTHROCYTE [DISTWIDTH] IN BLOOD BY AUTOMATED COUNT: 45.5 FL (ref 35.9–50)
ERYTHROCYTE [SEDIMENTATION RATE] IN BLOOD BY WESTERGREN METHOD: 5 MM/HOUR (ref 0–25)
GFR SERPLBLD CREATININE-BSD FMLA CKD-EPI: 89 ML/MIN/1.73 M 2
GLOBULIN SER CALC-MCNC: 2.4 G/DL (ref 1.9–3.5)
GLUCOSE SERPL-MCNC: 83 MG/DL (ref 65–99)
HCT VFR BLD AUTO: 43.9 % (ref 37–47)
HGB BLD-MCNC: 14.8 G/DL (ref 12–16)
IMM GRANULOCYTES # BLD AUTO: 0.02 K/UL (ref 0–0.11)
IMM GRANULOCYTES NFR BLD AUTO: 0.4 % (ref 0–0.9)
LYMPHOCYTES # BLD AUTO: 1.2 K/UL (ref 1–4.8)
LYMPHOCYTES NFR BLD: 26.3 % (ref 22–41)
MCH RBC QN AUTO: 33.6 PG (ref 27–33)
MCHC RBC AUTO-ENTMCNC: 33.7 G/DL (ref 32.2–35.5)
MCV RBC AUTO: 99.5 FL (ref 81.4–97.8)
MONOCYTES # BLD AUTO: 0.28 K/UL (ref 0–0.85)
MONOCYTES NFR BLD AUTO: 6.1 % (ref 0–13.4)
NEUTROPHILS # BLD AUTO: 2.94 K/UL (ref 1.82–7.42)
NEUTROPHILS NFR BLD: 64.3 % (ref 44–72)
NRBC # BLD AUTO: 0 K/UL
NRBC BLD-RTO: 0 /100 WBC (ref 0–0.2)
PLATELET # BLD AUTO: 164 K/UL (ref 164–446)
PMV BLD AUTO: 11 FL (ref 9–12.9)
POTASSIUM SERPL-SCNC: 4 MMOL/L (ref 3.6–5.5)
PROT SERPL-MCNC: 6.5 G/DL (ref 6–8.2)
RBC # BLD AUTO: 4.41 M/UL (ref 4.2–5.4)
SODIUM SERPL-SCNC: 140 MMOL/L (ref 135–145)
WBC # BLD AUTO: 4.6 K/UL (ref 4.8–10.8)

## 2024-10-30 PROCEDURE — 85025 COMPLETE CBC W/AUTO DIFF WBC: CPT

## 2024-10-30 PROCEDURE — 85652 RBC SED RATE AUTOMATED: CPT

## 2024-10-30 PROCEDURE — 84155 ASSAY OF PROTEIN SERUM: CPT

## 2024-10-30 PROCEDURE — 82787 IGG 1 2 3 OR 4 EACH: CPT | Mod: 91

## 2024-10-30 PROCEDURE — 82784 ASSAY IGA/IGD/IGG/IGM EACH: CPT

## 2024-10-30 PROCEDURE — 84165 PROTEIN E-PHORESIS SERUM: CPT

## 2024-10-30 PROCEDURE — 86039 ANTINUCLEAR ANTIBODIES (ANA): CPT

## 2024-10-30 PROCEDURE — 86162 COMPLEMENT TOTAL (CH50): CPT

## 2024-10-30 PROCEDURE — 36415 COLL VENOUS BLD VENIPUNCTURE: CPT

## 2024-10-30 PROCEDURE — 86140 C-REACTIVE PROTEIN: CPT

## 2024-10-30 PROCEDURE — 82785 ASSAY OF IGE: CPT

## 2024-10-30 PROCEDURE — 80053 COMPREHEN METABOLIC PANEL: CPT

## 2024-10-31 ENCOUNTER — PHARMACY VISIT (OUTPATIENT)
Dept: PHARMACY | Facility: MEDICAL CENTER | Age: 54
End: 2024-10-31
Payer: COMMERCIAL

## 2024-10-31 ENCOUNTER — APPOINTMENT (OUTPATIENT)
Dept: PHARMACY | Facility: MEDICAL CENTER | Age: 54
End: 2024-10-31
Payer: COMMERCIAL

## 2024-10-31 PROCEDURE — RXMED WILLOW AMBULATORY MEDICATION CHARGE: Performed by: INTERNAL MEDICINE

## 2024-10-31 RX ORDER — COVID-19 VACCINE, MRNA 0.04 MG/.418ML
INJECTION, SUSPENSION INTRAMUSCULAR
Qty: 0.3 ML | Refills: 0 | OUTPATIENT
Start: 2024-10-31

## 2024-10-31 RX ORDER — INFLUENZA A VIRUS A/VICTORIA/4897/2022 IVR-238 (H1N1) ANTIGEN (FORMALDEHYDE INACTIVATED), INFLUENZA A VIRUS A/CALIFORNIA/122/2022 SAN-022 (H3N2) ANTIGEN (FORMALDEHYDE INACTIVATED), AND INFLUENZA B VIRUS B/MICHIGAN/01/2021 ANTIGEN (FORMALDEHYDE INACTIVATED) 15; 15; 15 MG/.5ML; MG/.5ML; MG/.5ML
INJECTION, SUSPENSION INTRAMUSCULAR
Qty: 0.5 ML | Refills: 0 | OUTPATIENT
Start: 2024-10-31

## 2024-11-01 ENCOUNTER — PATIENT MESSAGE (OUTPATIENT)
Dept: MEDICAL GROUP | Facility: MEDICAL CENTER | Age: 54
End: 2024-11-01
Payer: COMMERCIAL

## 2024-11-01 DIAGNOSIS — G43.909 MIGRAINE WITHOUT STATUS MIGRAINOSUS, NOT INTRACTABLE, UNSPECIFIED MIGRAINE TYPE: ICD-10-CM

## 2024-11-01 LAB
ANA PAT SER IF-IMP: NORMAL
CH50 SERPL-ACNC: 44.3 U/ML (ref 38.7–89.9)
IGA SERPL-MCNC: 156 MG/DL (ref 68–408)
IGE SERPL-ACNC: <2 KU/L
IGG SERPL-MCNC: 987 MG/DL (ref 768–1632)
IGG1 SER-MCNC: 538 MG/DL (ref 240–1118)
IGG2 SER-MCNC: 327 MG/DL (ref 124–549)
IGG3 SER-MCNC: 33 MG/DL (ref 21–134)
IGG4 SER-MCNC: 9 MG/DL (ref 1–123)
IGM SERPL-MCNC: 272 MG/DL (ref 35–263)
NUCLEAR IGG SER QL IF: NORMAL

## 2024-11-01 PROCEDURE — RXMED WILLOW AMBULATORY MEDICATION CHARGE: Performed by: BEHAVIOR ANALYST

## 2024-11-01 RX ORDER — TOPIRAMATE 25 MG/1
25 TABLET, FILM COATED ORAL 2 TIMES DAILY
Qty: 180 TABLET | Refills: 3 | Status: SHIPPED | OUTPATIENT
Start: 2024-11-01

## 2024-11-03 LAB
ALBUMIN SERPL ELPH-MCNC: 4.09 G/DL (ref 3.75–5.01)
ALPHA1 GLOB SERPL ELPH-MCNC: 0.18 G/DL (ref 0.19–0.46)
ALPHA2 GLOB SERPL ELPH-MCNC: 0.42 G/DL (ref 0.48–1.05)
B-GLOBULIN SERPL ELPH-MCNC: 0.54 G/DL (ref 0.48–1.1)
GAMMA GLOB SERPL ELPH-MCNC: 1.16 G/DL (ref 0.62–1.51)
INTERPRETATION SERPL IFE-IMP: ABNORMAL
MONOCLON BAND OBS SERPL: ABNORMAL
MONOCLONAL PROTEIN NL11656: ABNORMAL G/DL
PATHOLOGY STUDY: ABNORMAL
PROT SERPL-MCNC: 6.4 G/DL (ref 6.3–8.2)

## 2024-11-10 ENCOUNTER — PHARMACY VISIT (OUTPATIENT)
Dept: PHARMACY | Facility: MEDICAL CENTER | Age: 54
End: 2024-11-10
Payer: COMMERCIAL

## 2024-11-18 PROCEDURE — RXMED WILLOW AMBULATORY MEDICATION CHARGE: Performed by: STUDENT IN AN ORGANIZED HEALTH CARE EDUCATION/TRAINING PROGRAM

## 2024-11-19 ENCOUNTER — TELEPHONE (OUTPATIENT)
Dept: RHEUMATOLOGY | Facility: MEDICAL CENTER | Age: 54
End: 2024-11-19
Payer: COMMERCIAL

## 2024-11-19 NOTE — TELEPHONE ENCOUNTER
Pt called back regarding message left to reschedule her 12/4 appt to the week of Thanksgiving but pt cannot make it in that week. Pt stated Dr Olivo wanted to see her back in 2 weeks to review her labs. Labs results came back 10/30 and pt hasn't heard anything back so she is unsure if doctor would still like to see her back? Please advise. Thank you,.

## 2024-11-21 ENCOUNTER — PHARMACY VISIT (OUTPATIENT)
Dept: PHARMACY | Facility: MEDICAL CENTER | Age: 54
End: 2024-11-21
Payer: COMMERCIAL

## 2024-11-26 LAB — MISCELLANEOUS LAB RESULT MISCLAB: NORMAL

## 2024-12-04 PROCEDURE — RXMED WILLOW AMBULATORY MEDICATION CHARGE: Performed by: BEHAVIOR ANALYST

## 2024-12-06 ENCOUNTER — PATIENT MESSAGE (OUTPATIENT)
Dept: MEDICAL GROUP | Facility: MEDICAL CENTER | Age: 54
End: 2024-12-06
Payer: COMMERCIAL

## 2024-12-06 ENCOUNTER — PHARMACY VISIT (OUTPATIENT)
Dept: PHARMACY | Facility: MEDICAL CENTER | Age: 54
End: 2024-12-06
Payer: COMMERCIAL

## 2024-12-06 DIAGNOSIS — Z98.84 H/O BARIATRIC SURGERY: ICD-10-CM

## 2024-12-09 ENCOUNTER — PHARMACY VISIT (OUTPATIENT)
Dept: PHARMACY | Facility: MEDICAL CENTER | Age: 54
End: 2024-12-09
Payer: COMMERCIAL

## 2024-12-09 PROCEDURE — RXMED WILLOW AMBULATORY MEDICATION CHARGE: Performed by: STUDENT IN AN ORGANIZED HEALTH CARE EDUCATION/TRAINING PROGRAM

## 2024-12-09 PROCEDURE — RXMED WILLOW AMBULATORY MEDICATION CHARGE: Performed by: BEHAVIOR ANALYST

## 2024-12-13 ENCOUNTER — PHARMACY VISIT (OUTPATIENT)
Dept: PHARMACY | Facility: MEDICAL CENTER | Age: 54
End: 2024-12-13

## 2024-12-16 ENCOUNTER — HOSPITAL ENCOUNTER (OUTPATIENT)
Dept: LAB | Facility: MEDICAL CENTER | Age: 54
End: 2024-12-16
Attending: BEHAVIOR ANALYST
Payer: COMMERCIAL

## 2024-12-16 DIAGNOSIS — Z98.84 H/O BARIATRIC SURGERY: ICD-10-CM

## 2024-12-16 LAB
25(OH)D3 SERPL-MCNC: 83 NG/ML (ref 30–100)
ALBUMIN SERPL BCP-MCNC: 3.8 G/DL (ref 3.2–4.9)
ALBUMIN/GLOB SERPL: 1.7 G/DL
ALP SERPL-CCNC: 76 U/L (ref 30–99)
ALT SERPL-CCNC: 25 U/L (ref 2–50)
ANION GAP SERPL CALC-SCNC: 7 MMOL/L (ref 7–16)
AST SERPL-CCNC: 25 U/L (ref 12–45)
BILIRUB SERPL-MCNC: 0.8 MG/DL (ref 0.1–1.5)
BUN SERPL-MCNC: 12 MG/DL (ref 8–22)
CALCIUM ALBUM COR SERPL-MCNC: 9.2 MG/DL (ref 8.5–10.5)
CALCIUM SERPL-MCNC: 9 MG/DL (ref 8.5–10.5)
CHLORIDE SERPL-SCNC: 108 MMOL/L (ref 96–112)
CHOLEST SERPL-MCNC: 111 MG/DL (ref 100–199)
CO2 SERPL-SCNC: 26 MMOL/L (ref 20–33)
CREAT SERPL-MCNC: 0.67 MG/DL (ref 0.5–1.4)
ERYTHROCYTE [DISTWIDTH] IN BLOOD BY AUTOMATED COUNT: 47.6 FL (ref 35.9–50)
FASTING STATUS PATIENT QL REPORTED: NORMAL
FOLATE SERPL-MCNC: 5.6 NG/ML
GFR SERPLBLD CREATININE-BSD FMLA CKD-EPI: 105 ML/MIN/1.73 M 2
GLOBULIN SER CALC-MCNC: 2.3 G/DL (ref 1.9–3.5)
GLUCOSE SERPL-MCNC: 79 MG/DL (ref 65–99)
HCT VFR BLD AUTO: 41.8 % (ref 37–47)
HDLC SERPL-MCNC: 62 MG/DL
HGB BLD-MCNC: 13.8 G/DL (ref 12–16)
IRON SATN MFR SERPL: 50 % (ref 15–55)
IRON SERPL-MCNC: 109 UG/DL (ref 40–170)
LDLC SERPL CALC-MCNC: 35 MG/DL
MAGNESIUM SERPL-MCNC: 1.7 MG/DL (ref 1.5–2.5)
MCH RBC QN AUTO: 33.1 PG (ref 27–33)
MCHC RBC AUTO-ENTMCNC: 33 G/DL (ref 32.2–35.5)
MCV RBC AUTO: 100.2 FL (ref 81.4–97.8)
PHOSPHATE SERPL-MCNC: 3.4 MG/DL (ref 2.5–4.5)
PLATELET # BLD AUTO: 204 K/UL (ref 164–446)
PMV BLD AUTO: 11.6 FL (ref 9–12.9)
POTASSIUM SERPL-SCNC: 4.4 MMOL/L (ref 3.6–5.5)
PREALB SERPL-MCNC: 22.9 MG/DL (ref 18–38)
PROT SERPL-MCNC: 6.1 G/DL (ref 6–8.2)
PTH-INTACT SERPL-MCNC: 30.9 PG/ML (ref 14–72)
RBC # BLD AUTO: 4.17 M/UL (ref 4.2–5.4)
SODIUM SERPL-SCNC: 141 MMOL/L (ref 135–145)
TIBC SERPL-MCNC: 216 UG/DL (ref 250–450)
TRIGL SERPL-MCNC: 68 MG/DL (ref 0–149)
TSH SERPL DL<=0.005 MIU/L-ACNC: 4.58 UIU/ML (ref 0.38–5.33)
UIBC SERPL-MCNC: 107 UG/DL (ref 110–370)
VIT B12 SERPL-MCNC: 1026 PG/ML (ref 211–911)
WBC # BLD AUTO: 5.5 K/UL (ref 4.8–10.8)

## 2024-12-16 PROCEDURE — 80061 LIPID PANEL: CPT

## 2024-12-16 PROCEDURE — 83735 ASSAY OF MAGNESIUM: CPT

## 2024-12-16 PROCEDURE — 84591 ASSAY OF NOS VITAMIN: CPT

## 2024-12-16 PROCEDURE — 82390 ASSAY OF CERULOPLASMIN: CPT

## 2024-12-16 PROCEDURE — 83970 ASSAY OF PARATHORMONE: CPT

## 2024-12-16 PROCEDURE — 82746 ASSAY OF FOLIC ACID SERUM: CPT

## 2024-12-16 PROCEDURE — 84443 ASSAY THYROID STIM HORMONE: CPT

## 2024-12-16 PROCEDURE — 84134 ASSAY OF PREALBUMIN: CPT

## 2024-12-16 PROCEDURE — 80053 COMPREHEN METABOLIC PANEL: CPT

## 2024-12-16 PROCEDURE — 84590 ASSAY OF VITAMIN A: CPT

## 2024-12-16 PROCEDURE — 84100 ASSAY OF PHOSPHORUS: CPT

## 2024-12-16 PROCEDURE — 36415 COLL VENOUS BLD VENIPUNCTURE: CPT

## 2024-12-16 PROCEDURE — 84255 ASSAY OF SELENIUM: CPT

## 2024-12-16 PROCEDURE — 83550 IRON BINDING TEST: CPT

## 2024-12-16 PROCEDURE — 85027 COMPLETE CBC AUTOMATED: CPT

## 2024-12-16 PROCEDURE — 84207 ASSAY OF VITAMIN B-6: CPT

## 2024-12-16 PROCEDURE — 84425 ASSAY OF VITAMIN B-1: CPT

## 2024-12-16 PROCEDURE — 82306 VITAMIN D 25 HYDROXY: CPT

## 2024-12-16 PROCEDURE — 84630 ASSAY OF ZINC: CPT

## 2024-12-16 PROCEDURE — 83540 ASSAY OF IRON: CPT

## 2024-12-16 PROCEDURE — 82607 VITAMIN B-12: CPT

## 2024-12-18 LAB
CERULOPLASMIN SERPL-MCNC: 19 MG/DL (ref 16–45)
SELENIUM SERPL-MCNC: 101.8 UG/L (ref 23–190)
ZINC SERPL-MCNC: 61.8 UG/DL (ref 60–120)

## 2024-12-19 ENCOUNTER — TELEPHONE (OUTPATIENT)
Dept: RHEUMATOLOGY | Facility: MEDICAL CENTER | Age: 54
End: 2024-12-19
Payer: COMMERCIAL

## 2024-12-19 LAB
ANNOTATION COMMENT IMP: NORMAL
RETINYL PALMITATE SERPL-MCNC: 0.02 MG/L (ref 0–0.1)
VIT A SERPL-MCNC: 0.62 MG/L (ref 0.3–1.2)
VIT B1 BLD-MCNC: 207 NMOL/L (ref 70–180)
VIT B6 SERPL-MCNC: 68.9 NMOL/L (ref 20–125)

## 2024-12-19 SDOH — ECONOMIC STABILITY: INCOME INSECURITY: IN THE LAST 12 MONTHS, WAS THERE A TIME WHEN YOU WERE NOT ABLE TO PAY THE MORTGAGE OR RENT ON TIME?: NO

## 2024-12-19 SDOH — ECONOMIC STABILITY: INCOME INSECURITY: HOW HARD IS IT FOR YOU TO PAY FOR THE VERY BASICS LIKE FOOD, HOUSING, MEDICAL CARE, AND HEATING?: NOT VERY HARD

## 2024-12-19 SDOH — HEALTH STABILITY: PHYSICAL HEALTH: ON AVERAGE, HOW MANY DAYS PER WEEK DO YOU ENGAGE IN MODERATE TO STRENUOUS EXERCISE (LIKE A BRISK WALK)?: 3 DAYS

## 2024-12-19 SDOH — ECONOMIC STABILITY: FOOD INSECURITY: WITHIN THE PAST 12 MONTHS, YOU WORRIED THAT YOUR FOOD WOULD RUN OUT BEFORE YOU GOT MONEY TO BUY MORE.: NEVER TRUE

## 2024-12-19 SDOH — HEALTH STABILITY: PHYSICAL HEALTH: ON AVERAGE, HOW MANY MINUTES DO YOU ENGAGE IN EXERCISE AT THIS LEVEL?: 100 MIN

## 2024-12-19 SDOH — ECONOMIC STABILITY: FOOD INSECURITY: WITHIN THE PAST 12 MONTHS, THE FOOD YOU BOUGHT JUST DIDN'T LAST AND YOU DIDN'T HAVE MONEY TO GET MORE.: NEVER TRUE

## 2024-12-19 ASSESSMENT — SOCIAL DETERMINANTS OF HEALTH (SDOH)
HOW OFTEN DO YOU HAVE SIX OR MORE DRINKS ON ONE OCCASION: NEVER
HOW MANY DRINKS CONTAINING ALCOHOL DO YOU HAVE ON A TYPICAL DAY WHEN YOU ARE DRINKING: 1 OR 2
HOW OFTEN DO YOU ATTEND CHURCH OR RELIGIOUS SERVICES?: MORE THAN 4 TIMES PER YEAR
HOW OFTEN DO YOU ATTENT MEETINGS OF THE CLUB OR ORGANIZATION YOU BELONG TO?: MORE THAN 4 TIMES PER YEAR
HOW OFTEN DO YOU HAVE A DRINK CONTAINING ALCOHOL: 2-3 TIMES A WEEK
HOW HARD IS IT FOR YOU TO PAY FOR THE VERY BASICS LIKE FOOD, HOUSING, MEDICAL CARE, AND HEATING?: NOT VERY HARD
IN A TYPICAL WEEK, HOW MANY TIMES DO YOU TALK ON THE PHONE WITH FAMILY, FRIENDS, OR NEIGHBORS?: MORE THAN THREE TIMES A WEEK
DO YOU BELONG TO ANY CLUBS OR ORGANIZATIONS SUCH AS CHURCH GROUPS UNIONS, FRATERNAL OR ATHLETIC GROUPS, OR SCHOOL GROUPS?: YES
IN THE PAST 12 MONTHS, HAS THE ELECTRIC, GAS, OIL, OR WATER COMPANY THREATENED TO SHUT OFF SERVICE IN YOUR HOME?: NO
IN A TYPICAL WEEK, HOW MANY TIMES DO YOU TALK ON THE PHONE WITH FAMILY, FRIENDS, OR NEIGHBORS?: MORE THAN THREE TIMES A WEEK
HOW OFTEN DO YOU GET TOGETHER WITH FRIENDS OR RELATIVES?: TWICE A WEEK
HOW OFTEN DO YOU ATTENT MEETINGS OF THE CLUB OR ORGANIZATION YOU BELONG TO?: MORE THAN 4 TIMES PER YEAR
HOW OFTEN DO YOU GET TOGETHER WITH FRIENDS OR RELATIVES?: TWICE A WEEK
DO YOU BELONG TO ANY CLUBS OR ORGANIZATIONS SUCH AS CHURCH GROUPS UNIONS, FRATERNAL OR ATHLETIC GROUPS, OR SCHOOL GROUPS?: YES
HOW OFTEN DO YOU ATTEND CHURCH OR RELIGIOUS SERVICES?: MORE THAN 4 TIMES PER YEAR
WITHIN THE PAST 12 MONTHS, YOU WORRIED THAT YOUR FOOD WOULD RUN OUT BEFORE YOU GOT THE MONEY TO BUY MORE: NEVER TRUE

## 2024-12-19 ASSESSMENT — LIFESTYLE VARIABLES
AUDIT-C TOTAL SCORE: 3
HOW OFTEN DO YOU HAVE A DRINK CONTAINING ALCOHOL: 2-3 TIMES A WEEK
SKIP TO QUESTIONS 9-10: 1
HOW OFTEN DO YOU HAVE SIX OR MORE DRINKS ON ONE OCCASION: NEVER
HOW MANY STANDARD DRINKS CONTAINING ALCOHOL DO YOU HAVE ON A TYPICAL DAY: 1 OR 2

## 2024-12-20 ENCOUNTER — APPOINTMENT (OUTPATIENT)
Dept: RADIOLOGY | Facility: MEDICAL CENTER | Age: 54
End: 2024-12-20
Attending: BEHAVIOR ANALYST
Payer: COMMERCIAL

## 2024-12-20 ENCOUNTER — OFFICE VISIT (OUTPATIENT)
Dept: MEDICAL GROUP | Facility: MEDICAL CENTER | Age: 54
End: 2024-12-20
Payer: COMMERCIAL

## 2024-12-20 VITALS
TEMPERATURE: 97.6 F | HEART RATE: 76 BPM | SYSTOLIC BLOOD PRESSURE: 120 MMHG | OXYGEN SATURATION: 98 % | WEIGHT: 170.64 LBS | BODY MASS INDEX: 30.23 KG/M2 | HEIGHT: 63 IN | DIASTOLIC BLOOD PRESSURE: 70 MMHG

## 2024-12-20 DIAGNOSIS — R77.8 LOW SERUM COMPLEMENT C3: ICD-10-CM

## 2024-12-20 DIAGNOSIS — Z12.31 VISIT FOR SCREENING MAMMOGRAM: ICD-10-CM

## 2024-12-20 DIAGNOSIS — D50.9 IRON DEFICIENCY ANEMIA, UNSPECIFIED IRON DEFICIENCY ANEMIA TYPE: Chronic | ICD-10-CM

## 2024-12-20 DIAGNOSIS — D12.6 ADENOMATOUS POLYP OF COLON, UNSPECIFIED PART OF COLON: ICD-10-CM

## 2024-12-20 DIAGNOSIS — Z00.00 WELLNESS EXAMINATION: ICD-10-CM

## 2024-12-20 DIAGNOSIS — K56.1 INTUSSUSCEPTION OF SMALL BOWEL (HCC): ICD-10-CM

## 2024-12-20 DIAGNOSIS — R79.89 ELEVATED FERRITIN LEVEL: ICD-10-CM

## 2024-12-20 PROCEDURE — 3074F SYST BP LT 130 MM HG: CPT | Performed by: BEHAVIOR ANALYST

## 2024-12-20 PROCEDURE — 77067 SCR MAMMO BI INCL CAD: CPT

## 2024-12-20 PROCEDURE — 3078F DIAST BP <80 MM HG: CPT | Performed by: BEHAVIOR ANALYST

## 2024-12-20 PROCEDURE — 99396 PREV VISIT EST AGE 40-64: CPT | Performed by: BEHAVIOR ANALYST

## 2024-12-20 ASSESSMENT — FIBROSIS 4 INDEX: FIB4 SCORE: 1.27

## 2024-12-20 NOTE — PROGRESS NOTES
Subjective:     CC:   Chief Complaint   Patient presents with    Annual Exam    Medication Refill    Lab Results       HPI:   Regina Schmidt is a 54 y.o. female who presents for annual exam with her spouse. She is feeling well but continues to have some ill-defined joint pain and fatigue although improved.  She would like to review her lab results.        Ob-Gyn/ History:    Patient has GYN provider: yes. Dr. Martinez  Last Pap Smear:  2/2024. No history of abnormal pap smears.  Current Contraceptive Method: IUD.  Yes currently sexually active.  Last menstrual period: IUD, potentially postmenopausal.   No significant bloating/fluid retention, pelvic pain, or dyspareunia. No vaginal discharge  Post-menopausal bleeding:  Had spotting a few months ago with change in the estrogen patch but has not reoccurred.   Urinary incontinence: Denies    Health Maintenance  Vitamin D screening: Up-to-date, reviewed results  Cholesterol Screening: Up-to-date, reviewed results  Diabetes Screening: Up-to-date, reviewed results   Diet: Mainly healthy  Exercise:   Physical Activity: Sufficiently Active (12/19/2024)    Exercise Vital Sign     Days of Exercise per Week: 3 days     Minutes of Exercise per Session: 100 min     Substance Abuse: Denies, we did talk about alcohol consumption  Safe in relationship.  Spouse attending visit today  Sun protection used.    Cancer screening  Colorectal Cancer Screening: Up-to-date, now every 3 years  Lung Cancer Screening: N/A  Cervical Cancer Screening: Up-to-date  Breast Cancer Screening: Up-to-date and scheduled for later today    Infectious disease screening/Immunizations  --STI Screening: Monogamous relationship, up-to-date  --Immunizations: Due for Tdap next dose of Shingrix  -Patient declines Tdap vaccine today      She  has a past medical history of Acute pansinusitis (11/25/2015), Fatigue (10/13/2014), Hemorrhagic disorder (HCC) (1975?), Impacted cerumen of both ears (10/13/2014),  Lymphedema (01/28/2011), Neck pain, musculoskeletal (11/25/2015), Reactive depression (11/12/2014), S/P gastric bypass (02/04/2010), and S/P tonsillectomy and adenoidectomy.    She has no past medical history of Acute nasopharyngitis, Allergy, Anesthesia, Anginal syndrome (HCC), Arrhythmia, Arthritis, ASTHMA, Asthma, Blood clotting disorder (HCC), Bowel habit changes, Breath shortness, Bronchitis, Cancer (HCC), Carcinoma in situ of respiratory system, Cataract, Congestive heart failure (HCC), Continuous ambulatory peritoneal dialysis status (HCC), Coughing blood, Dental disorder, Diabetes (HCC), Dialysis patient (HCC), Emphysema of lung (HCC), Glaucoma, Gynecological disorder, Heart burn, Heart murmur, Heart valve disease, Hepatitis A, Hepatitis B, Hepatitis C, Hiatus hernia syndrome, High cholesterol, Hypertension, Indigestion, Infectious disease, Jaundice, Myocardial infarct (HCC), Pacemaker, Pneumonia, Pregnant, Renal disorder, Rheumatic fever, Seizure (HCC), Sleep apnea, Snoring, Stroke (HCC), Tuberculosis, Urinary bladder disorder, or Urinary incontinence.  She  has a past surgical history that includes tonsillectomy and adenoidectomy; gastric resection; primary c section; cholecystectomy; abdominoplasty; US-CYST ASPIRATION-BREAST INITIAL; and bowel resection (N/A, 7/12/2022).    Family History   Problem Relation Age of Onset    Hypertension Mother     Cancer Mother         breast cancer    Arthritis Mother     Breast Cancer Mother     Cancer Father         lymphoma ,BCC,colon cancer 73    Hypertension Father     Alcohol/Drug Father     Colorectal Cancer Father     Heart Disease Father     Alcohol abuse Father     Hypertension Sister     Hyperlipidemia Sister     Hypertension Brother     Hyperlipidemia Brother     Cancer Maternal Aunt         breast    Breast Cancer Maternal Aunt     Bilateral Breast Cancer Maternal Aunt     Colorectal Cancer Maternal Uncle     Colorectal Cancer Paternal Uncle     Alcohol/Drug  Maternal Grandmother         prescription medications, renal failure    Cancer Maternal Grandmother     Stroke Paternal Grandfather     Breast Cancer Maternal Cousin     Ovarian Cancer Neg Hx     Tubal Cancer Neg Hx     Peritoneal Cancer Neg Hx        Social History     Socioeconomic History    Marital status: Single     Spouse name: Not on file    Number of children: Not on file    Years of education: Not on file    Highest education level: Bachelor's degree (e.g., BA, AB, BS)   Occupational History    Not on file   Tobacco Use    Smoking status: Never    Smokeless tobacco: Never   Vaping Use    Vaping status: Never Used   Substance and Sexual Activity    Alcohol use: Yes     Alcohol/week: 1.8 - 2.4 oz     Types: 3 - 4 Standard drinks or equivalent per week     Comment: occasionally    Drug use: No    Sexual activity: Yes     Partners: Male     Birth control/protection: I.U.D., Male Sterilization     Comment: Mirena  VAS   Other Topics Concern    Not on file   Social History Narrative    Not on file     Social Drivers of Health     Financial Resource Strain: Low Risk  (12/19/2024)    Overall Financial Resource Strain (CARDIA)     Difficulty of Paying Living Expenses: Not very hard   Food Insecurity: No Food Insecurity (12/19/2024)    Hunger Vital Sign     Worried About Running Out of Food in the Last Year: Never true     Ran Out of Food in the Last Year: Never true   Transportation Needs: No Transportation Needs (12/19/2024)    PRAPARE - Transportation     Lack of Transportation (Medical): No     Lack of Transportation (Non-Medical): No   Physical Activity: Sufficiently Active (12/19/2024)    Exercise Vital Sign     Days of Exercise per Week: 3 days     Minutes of Exercise per Session: 100 min   Stress: Stress Concern Present (12/19/2024)    [employer] [place] [employer] - Occupational Stress Questionnaire     Feeling of Stress : To some extent   Social Connections: Socially Integrated  (12/19/2024)    Social Connection and Isolation Panel [NHANES]     Frequency of Communication with Friends and Family: More than three times a week     Frequency of Social Gatherings with Friends and Family: Twice a week     Attends L[employer] day Services: More than 4 times per year     Active Member of Clubs or Organizations: Yes     Attends Club or Organization Meetings: More than 4 times per year     Marital Status:    Intimate Partner Violence: Not on file   Housing Stability: Low Risk  (12/19/2024)    Housing Stability Vital Sign     Unable to Pay for Housing in the Last Year: No     Number of Times Moved in the Last Year: 0     Homeless in the Last Year: No       Patient Active Problem List    Diagnosis Date Noted    Elevated ferritin level 12/20/2024    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    Adenomatous polyp of colon 04/20/2023    Chronic right shoulder pain 03/02/2023    Intussusception of small bowel (HCC) 07/12/2022    Bariatric surgery status 09/14/2016    Anxiety 09/14/2016    Reactive depression 11/12/2014    Fatigue 10/13/2014    Impacted cerumen of left ear 10/13/2014    Elevated parathyroid hormone 03/07/2014    Migraine headache 08/23/2012    Hypothyroid 02/09/2011    Lymphedema 01/28/2011    Vitamin D deficiency 02/04/2010    Iron deficiency anemia 02/04/2010         Current Outpatient Medications   Medication Sig Dispense Refill    topiramate (TOPAMAX) 25 MG Tab Take 1 Tablet by mouth 2 times a day for 90 days for migraine. 180 Tablet 3    influenza vaccine (FLUZONE) 0.5 ML Suspension Prefilled Syringe injection Inject  into the shoulder, thigh, or buttocks. 0.5 mL 0    COVID-19 mRNA Vac-Lina,Pfizer, (COMIRNATY) 30 MCG/0.3ML Suspension Prefilled Syringe injection Inject  into the shoulder, thigh, or buttocks. 0.3 mL 0    estradiol (CLIMARA) 0.1 MG/24HR PATCH WEEKLY Place 1 Patch on the skin every 7 days. 12 Patch 4    sumatriptan (IMITREX) 100 MG tablet Take 1 Tablet by mouth one time as needed  "for Migraine for up to 1 dose. 9 Tablet 6    SYNTHROID 88 MCG Tab Take 1 Tablet by mouth every morning on an empty stomach. 90 Tablet 3    buPROPion (WELLBUTRIN XL) 150 MG XL tablet Take 1 Tablet by mouth every morning. 90 Tablet 3    metaxalone (SKELAXIN) 800 MG Tab Take 1 Tablet by mouth 3 times a day. 45 Tablet 1    St Johns Wort 300 MG Cap Take 1 Cap by mouth 2 Times a Day.      5-Hydroxytryptophan (5-HTP PO) Take 200 mg by mouth 2 times a day.      levonorgestrel (MIRENA) 52 mg (20 mcg/24 hr) IUD 1 Each by Intrauterine route one time. Every 5 years      Probiotic Product (PROBIOTIC PO) Take 1 Tab by mouth 3 times a day.      Ferrous Fumarate 325 (106 FE) MG TABS Take 325 mg by mouth every day.      MULTI VIT/FL PO Take 1 Tablet by mouth every day.      CALCIUM CITRATE 250 MG PO TABS Take 250 mg by mouth 4 times a day.      VITAMIN A Take 25,000 Int'l Units by mouth every day.      VITAMIN C 500 MG PO TABS Take 1,000 mg by mouth 2 Times a Day.      GLUCOSAMINE CHONDRO COMPLEX PO Take 1 Tablet by mouth 2 times a day.       No current facility-administered medications for this visit.     Allergies   Allergen Reactions    Moxifloxacin Unspecified       Review of Systems   Eyes: Negative for pain.    Respiratory: Negative for  shortness of breath.  Cardiovascular: Negative for chest pain  Gastrointestinal: Negative for abdominal pain  Genitourinary: Negative for dysuria  Neurological: Negative for headaches.     Objective:     /70 (BP Location: Right arm, Patient Position: Sitting, BP Cuff Size: Adult)   Pulse 76   Temp 36.4 °C (97.6 °F) (Temporal)   Ht 1.6 m (5' 3\")   Wt 77.4 kg (170 lb 10.2 oz)   SpO2 98%   BMI 30.23 kg/m²   Body mass index is 30.23 kg/m².  Wt Readings from Last 4 Encounters:   12/20/24 77.4 kg (170 lb 10.2 oz)   10/23/24 75.3 kg (165 lb 14.4 oz)   09/19/24 76.7 kg (169 lb)   05/30/24 74.8 kg (165 lb)       Physical Exam:  Constitutional: Well-developed and well-nourished. Not " diaphoretic. No distress.   Skin: Skin is warm and dry. No rash noted.  Head: Atraumatic without lesions.  Eyes: Conjunctivae and extraocular motions are normal. Pupils are equal, round, and reactive to light. No scleral icterus.   Ears:  External ears unremarkable. Tympanic membranes clear and intact.  Nose: Nares patent. Septum midline. Turbinates without erythema nor edema. No discharge.   Mouth/Throat: Dentition is good. Tongue normal. Oropharynx is clear and moist. Posterior pharynx without erythema or exudates.  Neck: Supple, trachea midline. Normal range of motion. No thyromegaly present. No lymphadenopathy--cervical or supraclavicular.  Cardiovascular: Regular rate and rhythm, S1 and S2 without murmur, rubs, or gallops.  Lungs: Normal inspiratory effort, CTA bilaterally, no wheezes/rhonchi/rales  Abdomen: Soft, non tender, and without distention. Active bowel sounds in all four quadrants. No rebound, guarding, masses or HSM.  Extremities: No cyanosis, clubbing, erythema, nor edema. Distal pulses intact and symmetric.   Musculoskeletal: All major joints AROM full in all directions without pain.  Neurological: Alert and oriented x 3. . No cranial nerve deficit. . Sensation intact.   Psychiatric:  Behavior, mood, and affect are appropriate.        Assessment and Plan:     1. Wellness examination       HCM:     Applicable cancer screenings discussed with patient  Last lab results were reviewed by me today   New Labs per orders if indicated  Immunizations per orders if indicated  Patient counseling included stop/avoid smoking and nicotine products,  skin care with SPF, regular dental hygiene and dental visits and eye exams, diet, supplements, safe fire arm storage, safe sex and exercise     2. Iron deficiency anemia, unspecified iron deficiency anemia type     -Reviewed lab results including iron panel.  RBCs mildly elevated but no anemia.  -Recommend that she remain off of iron given elevated ferritin.     3.  Intussusception of small bowel (HCC)       -Patient concerned regarding malabsorption of vitamins.  We reviewed her lab results which showed no vitamin deficiencies.     4. Adenomatous polyp of colon, unspecified part of colon       -Up-to-date with colonoscopies.     5. Elevated ferritin level       -Most likely due to inflammatory processes.  Recommended low inflammatory diet.  Consider gluten and dairy free.  Reduce stress levels.  Reassurance provided.  Stay off iron supplements.     6. Low serum complement C3       -Stable.  Follow-up with dermatology         1        Follow-up: Return in about 6 months (around 6/20/2025).

## 2024-12-20 NOTE — TELEPHONE ENCOUNTER
Pt called stating she had a follow up appt with you on Dec 4th but she received a call that you were going to be out of the office that week so the appt was cancelled and could not be rescheduled until Feb. Pt messaged you on my chart on Dec 8th and was just waiting on lab results to see if you wanted to bring her back in for a follow up appt. She is aware one of the tests took awhile to come back in and stated that she sent another message on my chart a few days ago to follow up. She is fine with not needing to come back in if she doesn't have to she would just like to know if her labs are ok or does she need to schedule a follow up? Please advise. Thank you.

## 2024-12-27 LAB
NIACIN SERPL-MCNC: NORMAL NG/ML
NICOTINAMIDE SERPL-MCNC: 18 NG/ML
NICOTINURATE SERPL-MCNC: NORMAL NG/ML

## 2025-01-10 ENCOUNTER — APPOINTMENT (OUTPATIENT)
Dept: MEDICAL GROUP | Facility: MEDICAL CENTER | Age: 55
End: 2025-01-10
Payer: COMMERCIAL

## 2025-01-10 DIAGNOSIS — G43.909 MIGRAINE WITHOUT STATUS MIGRAINOSUS, NOT INTRACTABLE, UNSPECIFIED MIGRAINE TYPE: ICD-10-CM

## 2025-01-10 DIAGNOSIS — E03.9 ACQUIRED HYPOTHYROIDISM: ICD-10-CM

## 2025-01-10 DIAGNOSIS — F41.9 ANXIETY: ICD-10-CM

## 2025-01-10 NOTE — TELEPHONE ENCOUNTER
Received request via: Patient    Was the patient seen in the last year in this department? Yes    Does the patient have an active prescription (recently filled or refills available) for medication(s) requested?  Would like to be send to mail order pharmacy.    Pharmacy Name: [facility]    Does the patient have skilled nursing Plus and need 100-day supply? (This applies to ALL medications) Patient does not have SCP

## 2025-01-13 RX ORDER — SUMATRIPTAN SUCCINATE 100 MG/1
100 TABLET ORAL
Qty: 9 TABLET | Refills: 6 | Status: SHIPPED | OUTPATIENT
Start: 2025-01-13

## 2025-01-13 RX ORDER — BUPROPION HYDROCHLORIDE 150 MG/1
150 TABLET ORAL EVERY MORNING
Qty: 90 TABLET | Refills: 3 | Status: SHIPPED | OUTPATIENT
Start: 2025-01-13

## 2025-01-13 RX ORDER — TOPIRAMATE 25 MG/1
25 TABLET, FILM COATED ORAL 2 TIMES DAILY
Qty: 180 TABLET | Refills: 3 | Status: SHIPPED | OUTPATIENT
Start: 2025-01-13

## 2025-01-13 RX ORDER — LEVOTHYROXINE SODIUM 88 MCG
88 TABLET ORAL
Qty: 90 TABLET | Refills: 3 | Status: SHIPPED | OUTPATIENT
Start: 2025-01-13

## 2025-01-28 NOTE — ANESTHESIA PREPROCEDURE EVALUATION
Case: [id number] Date/Time: 07/12/22 3310    Procedures:       LAPAROSCOPIC REPAIR INTUSSUSCEPTION, POSSIBLE BOWEL RESECTION, POSSIBLE OPEN      COLECTOMY, LAPAROSCOPIC    Pre-op diagnosis: INTUSSUSCEPTION OF INTESTINE    Location: [facility] OR 09 / SURGERY [facility]    Surgeons: John H Ganser, M.D.          Relevant Problems   NEURO   (positive) Migraine headache      CARDIAC   (positive) Migraine headache      ENDO   (positive) Hypothyroid       Physical Exam    Airway   Mallampati: II  TM distance: >3 FB  Neck ROM: full       Cardiovascular - normal exam  Rhythm: regular  Rate: normal  (-) murmur     Dental - normal exam           Pulmonary - normal exam  Breath sounds clear to auscultation     Abdominal    Neurological - normal exam                 Anesthesia Plan    ASA 3       Plan - general       Airway plan will be ETT          Induction: intravenous    Postoperative Plan: Postoperative administration of opioids is intended.    Pertinent diagnostic labs and testing reviewed    Informed Consent:    Anesthetic plan and risks discussed with patient.    Use of blood products discussed with: patient whom consented to blood products.          Statement Selected

## 2025-02-07 ENCOUNTER — PATIENT MESSAGE (OUTPATIENT)
Dept: OBGYN | Facility: CLINIC | Age: 55
End: 2025-02-07
Payer: COMMERCIAL

## 2025-02-07 DIAGNOSIS — N95.1 VASOMOTOR SYMPTOMS DUE TO MENOPAUSE: ICD-10-CM

## 2025-02-10 RX ORDER — ESTRADIOL 0.1 MG/D
1 PATCH TRANSDERMAL
Qty: 12 PATCH | Refills: 4 | Status: SHIPPED | OUTPATIENT
Start: 2025-02-10

## 2025-03-05 ENCOUNTER — TELEMEDICINE (OUTPATIENT)
Dept: RHEUMATOLOGY | Facility: MEDICAL CENTER | Age: 55
End: 2025-03-05
Attending: STUDENT IN AN ORGANIZED HEALTH CARE EDUCATION/TRAINING PROGRAM
Payer: COMMERCIAL

## 2025-03-05 VITALS
BODY MASS INDEX: 29.59 KG/M2 | SYSTOLIC BLOOD PRESSURE: 98 MMHG | DIASTOLIC BLOOD PRESSURE: 56 MMHG | HEART RATE: 67 BPM | WEIGHT: 167 LBS | HEIGHT: 63 IN

## 2025-03-05 DIAGNOSIS — M25.50 ARTHRALGIA OF MULTIPLE JOINTS: ICD-10-CM

## 2025-03-05 DIAGNOSIS — R79.89 ELEVATED FERRITIN: ICD-10-CM

## 2025-03-05 DIAGNOSIS — N96 HISTORY OF MULTIPLE MISCARRIAGES: ICD-10-CM

## 2025-03-05 DIAGNOSIS — D84.1 HYPOCOMPLEMENTEMIA (HCC): ICD-10-CM

## 2025-03-05 PROCEDURE — 99214 OFFICE O/P EST MOD 30 MIN: CPT | Mod: 95 | Performed by: STUDENT IN AN ORGANIZED HEALTH CARE EDUCATION/TRAINING PROGRAM

## 2025-03-05 ASSESSMENT — FIBROSIS 4 INDEX: FIB4 SCORE: 1.27

## 2025-03-05 ASSESSMENT — PATIENT HEALTH QUESTIONNAIRE - PHQ9
8. MOVING OR SPEAKING SO SLOWLY THAT OTHER PEOPLE COULD HAVE NOTICED. OR THE OPPOSITE, BEING SO FIGETY OR RESTLESS THAT YOU HAVE BEEN MOVING AROUND A LOT MORE THAN USUAL: NOT AT ALL
SUM OF ALL RESPONSES TO PHQ QUESTIONS 1-9: 0
3. TROUBLE FALLING OR STAYING ASLEEP OR SLEEPING TOO MUCH: NOT AT ALL
5. POOR APPETITE OR OVEREATING: NOT AT ALL
7. TROUBLE CONCENTRATING ON THINGS, SUCH AS READING THE NEWSPAPER OR WATCHING TELEVISION: NOT AT ALL
9. THOUGHTS THAT YOU WOULD BE BETTER OFF DEAD, OR OF HURTING YOURSELF: NOT AT ALL
2. FEELING DOWN, DEPRESSED, IRRITABLE, OR HOPELESS: NOT AT ALL
4. FEELING TIRED OR HAVING LITTLE ENERGY: NOT AT ALL
1. LITTLE INTEREST OR PLEASURE IN DOING THINGS: NOT AT ALL
6. FEELING BAD ABOUT YOURSELF - OR THAT YOU ARE A FAILURE OR HAVE LET YOURSELF OR YOUR FAMILY DOWN: NOT AL ALL
SUM OF ALL RESPONSES TO PHQ9 QUESTIONS 1 AND 2: 0

## 2025-03-05 NOTE — PATIENT INSTRUCTIONS
Thank you for visiting our clinic today.     Summary of your visit:      Follow up in [facility] RHEUMATOLOGY AFTER VISIT GUIDE  Below are important guidelines to help you navigate your health care needs and assist us in caring for you safely and  effectively. We encourage you to carefully read and understand this information and adhere to them accordingly.    MyPrepApp Messaging and Phone Calls:   Diagnosis and Treatment - For a detailed explanation of your condition and treatment plan from today’s visit, refer  to the visit note on MyPrepApp via the following steps:  o Log in to MyPrepApp and click on “Visits” at the top.  o Scroll down to “Past Visits” under Appointments.  o Click on “View Notes” under the appropriate visit date.   Questions or Concerns - MyChart messaging is for non-urgent matters that do not require immediate attention and  should be brief with no more than two questions or concerns. If you have multiple questions or concerns, we ask that  you schedule an appointment to have them properly addressed.   Response to Messages - MyPrepApp messages are addressed throughout the week depending on clinical availability,  so we ask that you allow up to one week for a response.   Phone Calls and Voicemails - Phone calls and voicemail messages are reserved for time-sensitive matters that  cannot wait to be addressed via MyPrepApp. We ask that you refrain from calling the office multiple times or leaving  multiple voicemails regarding the same issue as doing so may lead to delays in response time.   Urgent Issues - For urgent medical matters or medical emergencies that cannot wait, you are advised to go to your  nearest Urgent Care or Emergency Department for immediate attention.    Laboratory Tests and Imaging Studies:   Future Lab and Imaging Orders - We ask that you get your lab tests and imaging studies done no later than one  week before your follow-up visit unless instructed otherwise.   Results  Communication - You may see some test results marked as “abnormal” that are not necessarily significant  or concerning. If there are significant abnormalities on your test results that warrant further action, you will be notified  via MyChart or phone call, otherwise they will be addressed at your follow-up visit.    Prescriptions and Refill Requests:   General Prescriptions (e.g. prednisone, hydroxychloroquine, leflunomide, methotrexate, etc.) - These are sent  to Retail Pharmacies, so all refill requests of these medications should be directed to your local pharmacy.   Specialty Prescriptions (e.g. Enbrel, Humira, Cosentyx, Xeljanz, etc.) - These are sent to Specialty Pharmacies,  so all refill requests of these medications should be directed to your designated specialty pharmacy.   Infusion Prescriptions (e.g. Remicade, Simponi Aria, Rituxan, Saphnelo, etc.) - These are sent to Outpatient  Infusion Centers, so all scheduling requests of these medications should be directed to your local infusion center.    Medication Risks and Adverse Effects:   Immunosuppressed Status - Steroids and antirheumatic drugs are immunosuppressants, so they increase the risk  of infections and can have side effects on various organ systems in your body, though most of them are uncommon.   Potential Side Effects - Be sure to read the drug package inserts to learn about the potential side effects of your  medications before you start taking them and take them exactly as prescribed unless instructed otherwise.   In Case of Side Effects - If you experience any significant side effects, stop taking the medication immediately and  promptly notify the prescriber. Depending on the severity of the side effects, consider going to an Urgent Care or  Emergency Department for immediate attention.    Immunizations and Health Screening:   Vaccinations - If you are on immunosuppressive therapy, it is important that you are up to date on  age-appropriate  immunizations, particularly shingles and pneumonia vaccines, which you can request from your primary care provider  or from us at your next appointment.   Screening Tests - It is also important that you are up to date on age-appropriate screening tests, such as pap  smear, mammography, and colonoscopy, which you can request from your primary care provider.    Educational and Supportive Resources:   [facility] ([email or web address]-[email or web address]) - Visit our website to learn more about  your condition and other rheumatic diseases, and gain access to many helpful resources for them.   Disposal of Old Medications (www.[place].gov/everyday-takeback-day) - Visit the Drug Enforcement Administration  website to find a nearby location where you can properly dispose of old medications you no longer need.   Disposal of Used [place] (www.safeneedledisposal.org) - Visit the Safe Needle Disposal Organization website to  find a nearby location where you can properly dispose of used needles from your injectable medications.  Revised 6/14/2024

## 2025-03-05 NOTE — PROGRESS NOTES
[facility] RHEUMATOLOGY  [address], [address], [place] [address]  Phone: [phone] ? Fax: [phone]  [facility]/Health-Services/Rheumatology    VIRTUAL (VIDEO) FOLLOW-UP VISIT NOTE      This evaluation was conducted via Zoom using secure and encrypted videoconferencing technology for virtual visit. The patient was in their home in the [facility]. The patient's identity was confirmed and verbal consent was obtained for this encounter.      DATE OF SERVICE: 03/05/2025         Subjective     PRIMARY CARE PRACTITIONER:  JAY Hussein  [address]  [facility] [id number]    PATIENT IDENTIFICATION:  Regina Schmidt  [address] [facility] [address]    YOB: 1970    MEDICAL RECORD NUMBER: [id number]         CHIEF COMPLAINT:   Chief Complaint   Patient presents with    Follow-Up     Elevated ferritin         RHEUMATOLOGIC HISTORY:  Regina Schmidt is a 54 y.o. female with pertinent history notable for hypothyroidism, iron deficiency anemia, macrocytosis, adhesive capsulitis of left shoulder, migraine headaches and anxiety, who presents for follow up.     Initial consult 2/2024:  Reported hx of prolonged bleeding since she was a young child.  This was discovered after a tonsillectomy surgery where she bled for awhile. She had an extensive hematologic workup at [facility] without a definitive diagnosis. Noted hx of heavy bleeding treated with IUD.  Reported chronic widespread joint pains most notably along the neck which caused quite a bit of stiffness in the AM and throughout the day, pain along the base of the thumbs mostly with repetitive activity such as typing, opening things etc, and mild R knee pain after walking for prolonged period of time.  Denied any joint swelling.  Noted ~1-1.5 hrs of AM stiffness mainly in the low back and neck. Stiffness in the hips and knees could last probably ~30 mins as soon as she gets out of a hot  shower.  As the day progressed, joint stiffness worsened despite activity. Noted dry eyes with gritty sensation, used eyedrops 2-3 times daily.  Reported some dry mouth with occasional dry cough mostly at night, no nocturnal disturbance or change in taste. Hx of occasional random rashes on the neck and anterior chest that resolves within hours, sometimes 2 days, but no photosensitive or malar rash.  Noted intermittent discomfort when swallowing which occurred with spicy or fatty foods or meats.  Reported history of 7 miscarriages that occurred on average 10-23 weeks of gestation.  She has 1 child. No history of stroke, DVT or PE, nasal/oral/genital ulcerations, muscle weakness, skin thickening or RP, episodes of red painful photophobic eyes, nonscarring alopecia but did have some thinning hair, episodes of red painful photophobic eyes, daily fevers that occur with rashes, unintentional weight loss, pleuritic CP, DRISCOLL, abdominal pain with hematochezia. Denied any preceding illness especially prior to labs in 12/2023.  On ROS, reported fatigue especially after long day at work. History of bariatric surgery 20 years prior and BEAU previously tx with iron infusions. History of bleeding intraoperatively during bypass surgery requiring blood transfusions (x 8). She had small bowel intussusception likely related to previous bariatric surgery in 7/2022.     Pertinent treatments: Ferrous fumarate   Pertinent positive labs: 10/2024, anti-salivary protein (80.2) and anti-parotid specific protein (60) on early Sjogren's profile, C3 (79.9), C4 (15.4); 12/2023; ferritin 625, elevated free kappa light chains (32), 11/2023 WBC with mild leukopenia at 4.0, platelet count 142, 04/2023; C3 mildly decreased at 85   Pertinent negative labs: 10/2024, MILTON; 2/2024, C1q, LA, anti-cardiolipin abs, anti-beta 2GP abs; 12/2023; LDH, normal kappa/lambda ratio, .2, 11/2023; TSH, 04/2023; C4, ESR/CRP, RF, MILTON (with reflexive profile),  10/2022; ferritin  Pertinent imagin/2021 MRI shoulder: Unremarkable  3/2021 MRI cervical spine: Small posterior broad-based disc protrusions at C5-T1 without central canal stenosis or neuroforaminal narrowing.  Interosseous lesion most consistent with intraosseous hemangioma associated with the C2 vertebral body.  2018 bilateral hip x-rays with pelvis: No acute hip fracture or dislocations.  Minimal hip joint spurs      INTERVAL HISTORY:  Reports interval history as noted on the questionnaire below or scanned under media tab.    Nothing has changed since the last time she was seen in 2024. She is working with her gynecology on HRT and have noticed mild improvement in fatigue and muscle aches.      REVIEW OF SYSTEMS:  Except as noted in the history above, relevant review of systems with emphasis on autoimmune rheumatic diseases was otherwise negative.      ACTIVE PROBLEM LIST:  Patient Active Problem List    Diagnosis Date Noted    Elevated ferritin level 2024    Low serum complement C3 2024    Adenomatous polyp of colon 2023    Chronic right shoulder pain 2023    Intussusception of small bowel (HCC) 2022    Bariatric surgery status 2016    Anxiety 2016    Reactive depression 2014    Fatigue 10/13/2014    Impacted cerumen of left ear 10/13/2014    Elevated parathyroid hormone 2014    Migraine headache 2012    Hypothyroid 2011    Lymphedema 2011    Vitamin D deficiency 2010    Iron deficiency anemia 2010       PAST MEDICAL HISTORY:  Past Medical History:   Diagnosis Date    Acute pansinusitis 2015    Fatigue 10/13/2014    Hemorrhagic disorder (HCC) 1975?    Impacted cerumen of both ears 10/13/2014    Lymphedema 2011    Neck pain, musculoskeletal 2015    Reactive depression 2014    S/P gastric bypass 2010    S/P tonsillectomy and adenoidectomy        PAST SURGICAL HISTORY:  Past Surgical History:    Procedure Laterality Date    BOWEL RESECTION N/A 7/12/2022    Procedure: LAPAROSCOPIC SMALL BOWEL RESECTION WITH ANASTAMOSIS;  Surgeon: John H Ganser, M.D.;  Location: SURGERY [facility];  Service: General    ABDOMINOPLASTY      following weight loss    CHOLECYSTECTOMY      GASTRIC RESECTION      duodenal switch    PRIMARY C SECTION      TONSILLECTOMY AND ADENOIDECTOMY      US-CYST ASPIRATION-BREAST INITIAL         MEDICATIONS:  Current Outpatient Medications   Medication Sig    estradiol (CLIMARA) 0.1 MG/24HR PATCH WEEKLY Place 1 Patch on the skin every 7 days.    buPROPion (WELLBUTRIN XL) 150 MG XL tablet Take 1 Tablet by mouth every morning.    sumatriptan (IMITREX) 100 MG tablet Take 1 Tablet by mouth one time as needed for Migraine for up to 1 dose.    SYNTHROID 88 MCG Tab Take 1 Tablet by mouth every morning on an empty stomach.    topiramate (TOPAMAX) 25 MG Tab Take 1 Tablet by mouth 2 times a day for 90 days for migraine.    influenza vaccine (FLUZONE) 0.5 ML Suspension Prefilled Syringe injection Inject  into the shoulder, thigh, or buttocks.    COVID-19 mRNA Vac-Lina,Pfizer, (COMIRNATY) 30 MCG/0.3ML Suspension Prefilled Syringe injection Inject  into the shoulder, thigh, or buttocks.    metaxalone (SKELAXIN) 800 MG Tab Take 1 Tablet by mouth 3 times a day.    levonorgestrel (MIRENA) 52 mg (20 mcg/24 hr) IUD 1 Each by Intrauterine route one time. Every 5 years    Probiotic Product (PROBIOTIC PO) Take 1 Tab by mouth 3 times a day.    MULTI VIT/FL PO Take 1 Tablet by mouth every day.    CALCIUM CITRATE 250 MG PO TABS Take 250 mg by mouth 4 times a day.    VITAMIN A Take 25,000 Int'l Units by mouth every day.    GLUCOSAMINE CHONDRO COMPLEX PO Take 1 Tablet by mouth 2 times a day.    St Johns Wort 300 MG Cap Take 1 Cap by mouth 2 Times a Day. (Patient not taking: Reported on 3/5/2025)    5-Hydroxytryptophan (5-HTP PO) Take 200 mg by mouth 2 times a day. (Patient not taking: Reported on 3/5/2025)    Ferrous  Fumarate 325 (106 FE) MG TABS Take 325 mg by mouth every day. (Patient not taking: Reported on 3/5/2025)    VITAMIN C 500 MG PO TABS Take 1,000 mg by mouth 2 Times a Day. (Patient not taking: Reported on 3/5/2025)       ALLERGIES:   Allergies   Allergen Reactions    Moxifloxacin Unspecified       IMMUNIZATIONS:  Immunization History   Administered Date(s) Administered    9VHPV VACCINE 2-3 DOSE IM (GARDASIL 9) 04/05/2023, 08/01/2023    COVID-19, mRNA, LNP-S, PF, ray-sucrose, 30 mcg/0.3 mL 11/09/2023, 10/31/2024    INFLUENZA TIV (IM) 01/17/2013, 09/18/2014, 10/06/2016    Influenza Seasonal Injectable - Historical Data 01/17/2013, 10/26/2013, 09/19/2014, 10/01/2015, 10/06/2016, 10/25/2017, 10/09/2018, 09/25/2019, 09/30/2020, 09/28/2021    Influenza Vaccine Quad Inj (Pf) 10/25/2017, 10/09/2018, 09/25/2019, 10/02/2020, 10/06/2022, 09/28/2023    Influenza split virus trivalent (PF) 10/31/2024    Influenza, unspecified formulation 09/30/2020    PFIZER WILKERSON CAP SARS-COV-2 VACCINATION (12+) 06/03/2022    PFIZER PURPLE CAP SARS-COV-2 VACCINATION (12+) 12/30/2020, 12/30/2020, 01/20/2021, 01/20/2021, 09/22/2021    Tdap Vaccine 08/23/2012    Tuberculin Skin Test 09/21/2011, 09/12/2012, 09/09/2013    Zoster Vaccine Recombinant (RZV) (SHINGRIX) 10/13/2023       SOCIAL HISTORY:   Social History     Socioeconomic History    Marital status: Single    Highest education level: Bachelor's degree (e.g., BA, AB, BS)   Tobacco Use    Smoking status: Never    Smokeless tobacco: Never   Vaping Use    Vaping status: Never Used   Substance and Sexual Activity    Alcohol use: Yes     Alcohol/week: 1.8 - 2.4 oz     Types: 3 - 4 Standard drinks or equivalent per week     Comment: occasionally    Drug use: No    Sexual activity: Yes     Partners: Male     Birth control/protection: I.U.D., Male Sterilization     Comment: Mirena  VAS     Social Drivers of Health     Financial Resource Strain: Low Risk  (12/19/2024)    Overall Financial  Resource Strain (CARDIA)     Difficulty of Paying Living Expenses: Not very hard   Food Insecurity: No Food Insecurity (12/19/2024)    Hunger Vital Sign     Worried About Running Out of Food in the Last Year: Never true     Ran Out of Food in the Last Year: Never true   Transportation Needs: No Transportation Needs (12/19/2024)    PRAPARE - Transportation     Lack of Transportation (Medical): No     Lack of Transportation (Non-Medical): No   Physical Activity: Sufficiently Active (12/19/2024)    Exercise Vital Sign     Days of Exercise per Week: 3 days     Minutes of Exercise per Session: 100 min   Stress: Stress Concern Present (12/19/2024)    [employer] [place] [employer] - Occupational Stress Questionnaire     Feeling of Stress : To some extent   Social Connections: Socially Integrated (12/19/2024)    Social Connection and Isolation Panel [NHANES]     Frequency of Communication with Friends and Family: More than three times a week     Frequency of Social Gatherings with Friends and Family: Twice a week     Attends Restorationism Services: More than 4 times per year     Active Member of Clubs or Organizations: Yes     Attends Club or Organization Meetings: More than 4 times per year     Marital Status:    Housing Stability: Low Risk  (12/19/2024)    Housing Stability Vital Sign     Unable to Pay for Housing in the Last Year: No     Number of Times Moved in the Last Year: 0     Homeless in the Last Year: No       FAMILY HISTORY:  Family History   Problem Relation Age of Onset    Hypertension Mother     Cancer Mother         breast cancer    Arthritis Mother     Breast Cancer Mother     Cancer Father         lymphoma ,BCC,colon cancer 73    Hypertension Father     Alcohol/Drug Father     Colorectal Cancer Father     Heart Disease Father     Alcohol abuse Father     Hypertension Sister     Hyperlipidemia Sister     Hypertension Brother     Hyperlipidemia Brother     Cancer Maternal Aunt         breast     "Breast Cancer Maternal Aunt     Bilateral Breast Cancer Maternal Aunt     Colorectal Cancer Maternal Uncle     Colorectal Cancer Paternal Uncle     Alcohol/Drug Maternal Grandmother         prescription medications, renal failure    Cancer Maternal Grandmother     Stroke Paternal Grandfather     Breast Cancer Maternal Cousin     Ovarian Cancer Neg Hx     Tubal Cancer Neg Hx     Peritoneal Cancer Neg Hx             Objective     Vital Signs: BP 98/56   Pulse 67   Ht 1.6 m (5' 3\")   Wt 75.8 kg (167 lb) Body mass index is 29.58 kg/m².    General: Appears well and comfortable  Eyes: Not applicable  ENT: Not applicable  Head/Neck: Not applicable  Cardiovascular: Not applicable  Respiratory: Breathing quiet and unlabored  Gastrointestinal: Not applicable  Integumentary: Not applicable  Musculoskeletal: Not applicable  Neurologic: Not applicable  Psychiatric: Mood and affect appropriate      PAST LABORATORY RESULTS REVIEWED AND INTERPRETED BY ME:  Lab Results   Component Value Date/Time    CREACTPROT <0.30 10/30/2024 06:33 AM    SEDRATEWES 5 10/30/2024 06:33 AM     Lab Results   Component Value Date/Time    Z5BAFLPCDHR 79.9 (L) 10/11/2024 10:04 AM    L9DFDXDVTUI 15.4 (L) 10/11/2024 10:04 AM     Lab Results   Component Value Date/Time    RHEUMFACTN <10 04/20/2023 11:02 AM     Lab Results   Component Value Date/Time    ANTINUCAB None Detected 04/20/2023 11:02 AM     Lab Results   Component Value Date/Time    IGACARDIOLI <10 02/15/2024 11:50 AM    IGMCARDIOLI 11 02/15/2024 11:50 AM    IGGCARDIOLI <10 02/15/2024 11:50 AM    RUSSELVIPER 40.5 02/15/2024 11:50 AM     Lab Results   Component Value Date/Time     10/30/2024 06:33 AM     10/30/2024 06:33 AM     Lab Results   Component Value Date/Time    MICROSOMALA 0.5 08/23/2012 02:32 PM    TSH 1.210 04/04/2011 12:00 AM    TSHULTRASEN 4.580 12/16/2024 06:12 AM    FREEDIR 1.26 04/04/2011 12:00 AM    FREET4 1.36 10/21/2020 06:11 AM     Lab Results   Component Value " Date/Time    25HYDROXY 83 12/16/2024 06:12 AM    PTHINTACT 30.9 12/16/2024 06:12 AM     Lab Results   Component Value Date/Time    FERRITIN 386.0 (H) 10/11/2024 10:04 AM    IRON 109 12/16/2024 06:12 AM    FOLATE 5.6 12/16/2024 06:12 AM    PROTHROMBTM 16.8 (H) 02/15/2024 11:50 AM    INR 1.34 (H) 02/15/2024 11:50 AM     Lab Results   Component Value Date/Time    WBC 5.5 12/16/2024 06:12 AM    WBC 4.6 02/04/2011 09:12 AM    RBC 4.17 (L) 12/16/2024 06:12 AM    RBC 3.79 (L) 02/04/2011 09:12 AM    HEMOGLOBIN 13.8 12/16/2024 06:12 AM    HEMATOCRIT 41.8 12/16/2024 06:12 AM    .2 (H) 12/16/2024 06:12 AM    MCV 99 (H) 02/04/2011 09:12 AM    MCH 33.1 (H) 12/16/2024 06:12 AM    MCH 33.5 02/04/2011 09:12 AM    MCHC 33.0 12/16/2024 06:12 AM    RDW 47.6 12/16/2024 06:12 AM    RDW 13.0 02/04/2011 09:12 AM    PLATELETCT 204 12/16/2024 06:12 AM    MPV 11.6 12/16/2024 06:12 AM    NEUTS 2.94 10/30/2024 06:33 AM    NEUTS 2.5 02/04/2011 09:12 AM    LYMPHOCYTES 26.30 10/30/2024 06:33 AM    MONOCYTES 6.10 10/30/2024 06:33 AM    EOSINOPHILS 2.20 10/30/2024 06:33 AM    BASOPHILS 0.70 10/30/2024 06:33 AM     Lab Results   Component Value Date/Time    ASTSGOT 25 12/16/2024 06:12 AM    ALTSGPT 25 12/16/2024 06:12 AM    ALKPHOSPHAT 76 12/16/2024 06:12 AM    TBILIRUBIN 0.8 12/16/2024 06:12 AM    TOTPROTEIN 6.1 12/16/2024 06:12 AM    TOTPROTEIN 6.4 10/30/2024 06:33 AM    ALBUMIN 3.8 12/16/2024 06:12 AM    ALBUMIN 4.09 10/30/2024 06:33 AM     Lab Results   Component Value Date/Time    SODIUM 141 12/16/2024 06:12 AM    POTASSIUM 4.4 12/16/2024 06:12 AM    CHLORIDE 108 12/16/2024 06:12 AM    CO2 26 12/16/2024 06:12 AM    GLUCOSE 79 12/16/2024 06:12 AM    BUN 12 12/16/2024 06:12 AM    CREATININE 0.67 12/16/2024 06:12 AM    CREATININE 0.76 02/04/2011 09:12 AM    BUNCREATRAT 16 02/04/2011 09:12 AM    GLOMRATE >59 02/04/2011 09:12 AM    CALCIUM 9.0 12/16/2024 06:12 AM    MAGNESIUM 1.7 12/16/2024 06:12 AM     Lab Results   Component Value Date/Time     COLORURINE DK Yellow 02/26/2019 10:52 AM    SPECGRAVITY 1.006 02/26/2019 10:52 AM    PHURINE 6.0 02/26/2019 10:52 AM    GLUCOSEUR Negative 02/26/2019 10:52 AM    KETONES Negative 02/26/2019 10:52 AM    PROTEINURIN Negative 02/26/2019 10:52 AM       Lab Results   Component Value Date/Time    HEPCAB Negative 02/03/2017 02:24 PM     Lab Results   Component Value Date/Time    CHOLSTRLTOT 111 12/16/2024 06:12 AM    LDL 35 12/16/2024 06:12 AM    HDL 62 12/16/2024 06:12 AM    TRIGLYCERIDE 68 12/16/2024 06:12 AM    HBA1C 3.9 08/26/2019 06:16 AM       PAST RADIOLOGY RESULTS REVIEWED AND INTERPRETED BY ME:      All relevant laboratory and imaging results reported on this note were reviewed and interpreted by me.         Assessment & Plan     Regina Schmidt is a 54 y.o. female with history as noted above whose presentation merits the following clinical impressions and recommendations:    1. Hypocomplementemia (HCC)  Given history of hives, hypocomplementemic urticarial vasculitis was considered however, C1q was normal and no history of post-inflammatory residual hyperpigmentation, inflammatory eye disease, or renal involvement. Also, complement (CH50) was normal and no history of angioedema. Low C3 is often associated with systemic lupus erythematosus, antiphospholipid syndrome, mixed cryoglobulinemia, Sjögren syndrome, membranoproliferative glomerulonephritis (including post-streptococcal nephritis) and Parvovirus (both low C3/C4) but history and physical does not suggest either of these disease entities. She does have 2/9 antibodies on early Sjogren's profile panel which could indicate risk of Sjogren's so if she develops significant sicca complex, or extraglandular complications of Sjogren's such as vasculitis, PAH, ILD, lymphoma, or renal involvement, then clinical follow-up for re-evaluation would be reasonable.  - COMPLEMENT C3; Future  - COMPLEMENT C4; Future    2. Elevated ferritin  Elevated elevated free  Normal-Respiratory Variation kappa light chains   Serum protein measured on SPEP and ferritin are acute phase reactants so can be elevated acutely in inflammatory conditions that are non rheumatic. Serologies, history and exam are not suggestive of rheumatic autoimmune/autoinflammatory conditions such as Sjogren syndrome, systemic lupus, inflammatory myopathy, systemic sclerosis, or periodic autoinflammatory conditions at this time. These labs should be repeated to establish a trend.     3. History of multiple miscarriages  No history of strokes, DVT/PE. Normal APLs labs.     4. Arthralgia of multiple joints  Presentation is compatible with biomechanical arthralgia  rather than inflammatory arthritis. The current clinical evidence does not support the presence of an underlying autoimmune inflammatory arthritis, such as rheumatoid arthritis, spondyloarthritis, or lupus-spectrum arthritis.    The above assessment and plan were discussed with the patient who acknowledged understanding of the plan.    FOLLOW-UP: Return TBD.         Thank you for the opportunity to participate in the care of Regina Walterjaimieshelia.    Sommer Olivo D.O.

## 2025-03-21 NOTE — PROGRESS NOTES
Pt presents for Annual exam and to remove and replace Mirena IUD.(Not due until 2/2026 per chart)  Last pap 7/19/2021= WNL,HPV-  Last Mammo-12/20/2024-WNL (dense breast tissue)  Ph#[phone]  Pharm# [facility]

## 2025-03-24 ENCOUNTER — GYNECOLOGY VISIT (OUTPATIENT)
Dept: OBGYN | Facility: CLINIC | Age: 55
End: 2025-03-24
Payer: COMMERCIAL

## 2025-03-24 ENCOUNTER — HOSPITAL ENCOUNTER (OUTPATIENT)
Facility: MEDICAL CENTER | Age: 55
End: 2025-03-24
Attending: STUDENT IN AN ORGANIZED HEALTH CARE EDUCATION/TRAINING PROGRAM
Payer: COMMERCIAL

## 2025-03-24 VITALS
BODY MASS INDEX: 31.54 KG/M2 | HEIGHT: 63 IN | WEIGHT: 178 LBS | DIASTOLIC BLOOD PRESSURE: 66 MMHG | SYSTOLIC BLOOD PRESSURE: 107 MMHG

## 2025-03-24 DIAGNOSIS — N95.9 MENOPAUSAL AND PERIMENOPAUSAL DISORDER: ICD-10-CM

## 2025-03-24 DIAGNOSIS — Z01.419 WELL WOMAN EXAM WITH ROUTINE GYNECOLOGICAL EXAM: ICD-10-CM

## 2025-03-24 DIAGNOSIS — R53.82 CHRONIC FATIGUE: ICD-10-CM

## 2025-03-24 PROCEDURE — 88142 CYTOPATH C/V THIN LAYER: CPT

## 2025-03-24 PROCEDURE — 87624 HPV HI-RISK TYP POOLED RSLT: CPT

## 2025-03-24 ASSESSMENT — FIBROSIS 4 INDEX: FIB4 SCORE: 1.27

## 2025-03-24 NOTE — PROGRESS NOTES
ANNUAL GYNECOLOGY VISIT    Chief Complaint  Annual Exam    Subjective  Regina Schmidt is a 54 y.o. female  No LMP recorded. Patient has had an implant., Mirena since 2018 for contraception and dysmenorrha, now using it for endometrial protection while on transdermal estrogen. Unclear menopausal status d/t several year history of Mirena use and subsequent amenorrhea. She initially presented today for a Well-woman and Mirena removal and and replacement. Pt complains of continued menopausal symptoms such as fatigue and vasomotor symptoms.     Preventive Care   Immunization History   Administered Date(s) Administered    9VHPV VACCINE 2-3 DOSE IM (GARDASIL 9) 2023, 2023    COVID-19, mRNA, LNP-S, PF, ray-sucrose, 30 mcg/0.3 mL 2023, 10/31/2024    INFLUENZA TIV (IM) 2013, 2014, 10/06/2016    Influenza Seasonal Injectable - Historical Data 2013, 10/26/2013, 2014, 10/01/2015, 10/06/2016, 10/25/2017, 10/09/2018, 2019, 2020, 2021    Influenza Vaccine Quad Inj (Pf) 10/25/2017, 10/09/2018, 2019, 10/02/2020, 10/06/2022, 2023    Influenza split virus trivalent (PF) 10/31/2024    Influenza, unspecified formulation 2020    PFIZER WILKERSON CAP SARS-COV-2 VACCINATION (12+) 2022    PFIZER PURPLE CAP SARS-COV-2 VACCINATION (12+) 2020, 2020, 2021, 2021, 2021    Tdap Vaccine 2012    Tuberculin Skin Test 2011, 2012, 2013    Zoster Vaccine Recombinant (RZV) (SHINGRIX) 10/13/2023     Last Pap: 2021  Any abnormal pap smears?  yes - had a LEEP possible in   Last Mammogram: 2024  Last Colonoscopy: 2024, many polyps therefore 3 year follow-up was recommended  Last DEXA: 2017      Gynecology History  Current Sexual Activity: yes -  , male, monogamous  History of sexually transmitted diseases, yes - HPV  Abnormal discharge? no  Menopause: unsure, not menstruating due to  Mirena  Postmenopausal bleeding: no    Menstrual History  No LMP recorded. Patient has had an implant.  Periods are absent due to Mirena,     Contraception  Current: IUD inserted ; 2018      Obstetric History  OB History    Para Term  AB Living   7 1 1  6 1   SAB IAB Ectopic Molar Multiple Live Births   6     1      # Outcome Date GA Lbr Jaxson/2nd Weight Sex Type Anes PTL Lv   7 Term 00 42w0d  3.856 kg (8 lb 8 oz) F CS-LTranv  N KJ      Birth Comments: Baby big      Complications: Fetal Intolerance   6 SAB            5 SAB            4 SAB            3 SAB            2 SAB            1 SAB                Past Medical History  Past Medical History:   Diagnosis Date    Acute pansinusitis 2015    Fatigue 10/13/2014    Hemorrhagic disorder (HCC) 1975?    Impacted cerumen of both ears 10/13/2014    Lymphedema 2011    Neck pain, musculoskeletal 2015    Reactive depression 2014    S/P gastric bypass 2010    S/P tonsillectomy and adenoidectomy        Past Surgical History  Past Surgical History:   Procedure Laterality Date    BOWEL RESECTION N/A 2022    Procedure: LAPAROSCOPIC SMALL BOWEL RESECTION WITH ANASTAMOSIS;  Surgeon: John H Ganser, M.D.;  Location: SURGERY [facility];  Service: General    ABDOMINOPLASTY      following weight loss    CHOLECYSTECTOMY      GASTRIC RESECTION      duodenal switch    PRIMARY C SECTION      TONSILLECTOMY AND ADENOIDECTOMY      US-CYST ASPIRATION-BREAST INITIAL         Social History  Social History     Tobacco Use    Smoking status: Never    Smokeless tobacco: Never   Vaping Use    Vaping status: Never Used   Substance Use Topics    Alcohol use: Yes     Alcohol/week: 1.8 - 2.4 oz     Types: 3 - 4 Standard drinks or equivalent per week     Comment: occasionally    Drug use: No        Family History  Family History   Problem Relation Age of Onset    Hypertension Mother     Cancer Mother         breast cancer    Arthritis Mother      Breast Cancer Mother     Cancer Father         lymphoma ,BCC,colon cancer 73    Hypertension Father     Alcohol/Drug Father     Colorectal Cancer Father     Heart Disease Father     Alcohol abuse Father     Hypertension Sister     Hyperlipidemia Sister     Hypertension Brother     Hyperlipidemia Brother     Cancer Maternal Aunt         breast    Breast Cancer Maternal Aunt     Bilateral Breast Cancer Maternal Aunt     Colorectal Cancer Maternal Uncle     Colorectal Cancer Paternal Uncle     Alcohol/Drug Maternal Grandmother         prescription medications, renal failure    Cancer Maternal Grandmother     Stroke Paternal Grandfather     Breast Cancer Maternal Cousin     Ovarian Cancer Neg Hx     Tubal Cancer Neg Hx     Peritoneal Cancer Neg Hx        Home Medications  Current Outpatient Medications   Medication Sig    estradiol (CLIMARA) 0.1 MG/24HR PATCH WEEKLY Place 1 Patch on the skin every 7 days.    buPROPion (WELLBUTRIN XL) 150 MG XL tablet Take 1 Tablet by mouth every morning.    sumatriptan (IMITREX) 100 MG tablet Take 1 Tablet by mouth one time as needed for Migraine for up to 1 dose.    SYNTHROID 88 MCG Tab Take 1 Tablet by mouth every morning on an empty stomach.    topiramate (TOPAMAX) 25 MG Tab Take 1 Tablet by mouth 2 times a day for 90 days for migraine.    metaxalone (SKELAXIN) 800 MG Tab Take 1 Tablet by mouth 3 times a day.    St Johns Wort 300 MG Cap Take 1 Capsule by mouth 2 times a day.    levonorgestrel (MIRENA) 52 mg (20 mcg/24 hr) IUD 1 Each by Intrauterine route one time. Every 5 years    Probiotic Product (PROBIOTIC PO) Take 1 Tab by mouth 3 times a day.    MULTI VIT/FL PO Take 1 Tablet by mouth every day.    CALCIUM CITRATE 250 MG PO TABS Take 250 mg by mouth 4 times a day.    VITAMIN A Take 25,000 Int'l Units by mouth every day.    VITAMIN C 500 MG PO TABS Take 1,000 mg by mouth 2 times a day.    GLUCOSAMINE CHONDRO COMPLEX PO Take 1 Tablet by mouth 2 times a day.    influenza vaccine  "(FLUZONE) 0.5 ML Suspension Prefilled Syringe injection Inject  into the shoulder, thigh, or buttocks.    COVID-19 mRNA Vac-Lina,Pfizer, (COMIRNATY) 30 MCG/0.3ML Suspension Prefilled Syringe injection Inject  into the shoulder, thigh, or buttocks.    5-Hydroxytryptophan (5-HTP PO) Take 200 mg by mouth 2 times a day. (Patient not taking: Reported on 3/5/2025)    Ferrous Fumarate 325 (106 FE) MG TABS Take 325 mg by mouth every day. (Patient not taking: Reported on 3/5/2025)       Allergies/Reactions  Allergies   Allergen Reactions    Moxifloxacin Unspecified       ROS  Review of Systems:  Gen: no fevers or chills, no significant weight loss or gain  Respiratory:  no cough or dyspnea  Cardiac:  no chest pain, no palpitations, no syncope  GI:  no heartburn, no abdominal pain, no nausea or vomiting  Psych: no depression or anxiety    Objective  /66 (BP Location: Right arm, Patient Position: Sitting, BP Cuff Size: Adult)   Ht 1.6 m (5' 3\")   Wt 80.7 kg (178 lb)   BMI 31.53 kg/m²     Constitutional: The patient is well developed and well nourished.  Psychiatric: Patient is oriented to time place and person.   Skin: No rash observed.  Neck: Appears symmetric. Thyroid normal size  Respiratory: normal effort  Breast: Inspection reveals no asymetry or nipple discharge, no skin thickening, dimpling or erythema.  Palpation demonstrates no masses.  Abdomen: Soft, non-tender.  Pelvic Exam:      Vulva: external female genitalia are normal in appearance. No lesions     Urethra - no lesions, no erythema     Vagina: moist, pink, normal ruggae     Cervix: pink, smooth, no lesions, no CMT     Uterus - non-tender, normal size, shape, contour, mobile, anteverted     Ovaries: non-tender, no appreciable masses   Pap Smear performed: Yes, Patient declined STI testing and vaginitis swab  Chaperone offered: chaperone present, Duane Eddy  Extremities: Legs are symmetric      Assessment & Plan  Regina Charlton Brayan is a 54 y.o. " female who presents today for Annual Gyn Exam.     1. Health Maintenance   PAP: collected  STI Screen (HIV, Syphilis, Chlamydia / Gonorrhea): declined  MAMMOGRAM: 12/2024, normal, needs repeat in 12/2025  COLONOSCOPY: Due 5/2027  DEXA: UTD  IMMUNIZATIONS: UTD  TOBACCO: does not use  DIABETES SCREEN: per primary  CHOLESTEROL SCREEN: per primary  COUNSELING: breast self exam, mammography screening, use and side effects of HRT, and menopause    2. Contraception   Mirena, will remove and replace 2/2026    3. Unclear menopausal status. Has Mirena. Due to get it replaced 2/2026. She would like to continue it for endometrial protection while on HRT.    4. Menopausal symptoms, improved somewhat on her current HRT (transdermal estrogen) regimen however patient is interested in exploring testosterone as well especially due to the fatigue. She has hypothyroidism and is on supplementation. TSH in 10/2024 and 12/2024 was normal.     She has tried the testosterone gel on the inside of her legs however that is not sustainable for her and she doesn't want something she has to have implanted such as pellets. I recommend she try [facility] for a consultation regarding testosterone supplementation because they have a physician prescriber although, I am unsure of the delivery method.     Return: Annually or PRN    Sanjuanita Martinez D.O.  [facility]   3/24/2025

## 2025-03-27 ENCOUNTER — RESULTS FOLLOW-UP (OUTPATIENT)
Dept: OBGYN | Facility: CLINIC | Age: 55
End: 2025-03-27

## 2025-03-27 LAB
HPV I/H RISK 1 DNA SPEC QL NAA+PROBE: NOT DETECTED
SPECIMEN SOURCE: NORMAL
THINPREP PAP, CYTOLOGY NL11781: NORMAL

## 2025-03-28 NOTE — RESULT ENCOUNTER NOTE
Joel Tapia    I reviewed your pap smear results and it was normal. Your HPV test negative. You will need a repeat pap in 5 years.    Sanjuanita Martinez DO, FACOG  [facility]

## 2025-04-03 ENCOUNTER — HOSPITAL ENCOUNTER (OUTPATIENT)
Dept: LAB | Facility: MEDICAL CENTER | Age: 55
End: 2025-04-03
Attending: PHYSICIAN ASSISTANT
Payer: COMMERCIAL

## 2025-04-03 LAB
25(OH)D3 SERPL-MCNC: 61 NG/ML (ref 30–100)
ALBUMIN SERPL BCP-MCNC: 3.9 G/DL (ref 3.2–4.9)
ALBUMIN/GLOB SERPL: 1.4 G/DL
ALP SERPL-CCNC: 72 U/L (ref 30–99)
ALT SERPL-CCNC: 26 U/L (ref 2–50)
ANION GAP SERPL CALC-SCNC: 9 MMOL/L (ref 7–16)
AST SERPL-CCNC: 24 U/L (ref 12–45)
BASOPHILS # BLD AUTO: 0.9 % (ref 0–1.8)
BASOPHILS # BLD: 0.06 K/UL (ref 0–0.12)
BILIRUB SERPL-MCNC: 0.6 MG/DL (ref 0.1–1.5)
BUN SERPL-MCNC: 18 MG/DL (ref 8–22)
CALCIUM ALBUM COR SERPL-MCNC: 9 MG/DL (ref 8.5–10.5)
CALCIUM SERPL-MCNC: 8.9 MG/DL (ref 8.5–10.5)
CHLORIDE SERPL-SCNC: 108 MMOL/L (ref 96–112)
CO2 SERPL-SCNC: 22 MMOL/L (ref 20–33)
CREAT SERPL-MCNC: 0.78 MG/DL (ref 0.5–1.4)
EOSINOPHIL # BLD AUTO: 0.09 K/UL (ref 0–0.51)
EOSINOPHIL NFR BLD: 1.4 % (ref 0–6.9)
ERYTHROCYTE [DISTWIDTH] IN BLOOD BY AUTOMATED COUNT: 48.8 FL (ref 35.9–50)
ESTRADIOL SERPL-MCNC: 161 PG/ML
FSH SERPL-ACNC: 7.2 MIU/ML
GFR SERPLBLD CREATININE-BSD FMLA CKD-EPI: 91 ML/MIN/1.73 M 2
GLOBULIN SER CALC-MCNC: 2.8 G/DL (ref 1.9–3.5)
GLUCOSE SERPL-MCNC: 83 MG/DL (ref 65–99)
HCT VFR BLD AUTO: 45 % (ref 37–47)
HGB BLD-MCNC: 14.4 G/DL (ref 12–16)
IMM GRANULOCYTES # BLD AUTO: 0.02 K/UL (ref 0–0.11)
IMM GRANULOCYTES NFR BLD AUTO: 0.3 % (ref 0–0.9)
LYMPHOCYTES # BLD AUTO: 1.44 K/UL (ref 1–4.8)
LYMPHOCYTES NFR BLD: 21.8 % (ref 22–41)
MCH RBC QN AUTO: 32.8 PG (ref 27–33)
MCHC RBC AUTO-ENTMCNC: 32 G/DL (ref 32.2–35.5)
MCV RBC AUTO: 102.5 FL (ref 81.4–97.8)
MONOCYTES # BLD AUTO: 0.38 K/UL (ref 0–0.85)
MONOCYTES NFR BLD AUTO: 5.7 % (ref 0–13.4)
NEUTROPHILS # BLD AUTO: 4.63 K/UL (ref 1.82–7.42)
NEUTROPHILS NFR BLD: 69.9 % (ref 44–72)
NRBC # BLD AUTO: 0 K/UL
NRBC BLD-RTO: 0 /100 WBC (ref 0–0.2)
PLATELET # BLD AUTO: 199 K/UL (ref 164–446)
PMV BLD AUTO: 11.3 FL (ref 9–12.9)
POTASSIUM SERPL-SCNC: 4.4 MMOL/L (ref 3.6–5.5)
PROT SERPL-MCNC: 6.7 G/DL (ref 6–8.2)
RBC # BLD AUTO: 4.39 M/UL (ref 4.2–5.4)
SODIUM SERPL-SCNC: 139 MMOL/L (ref 135–145)
T3FREE SERPL-MCNC: 2.6 PG/ML (ref 2–4.4)
T4 SERPL-MCNC: 7.4 UG/DL (ref 4–12)
THYROPEROXIDASE AB SERPL-ACNC: 12 IU/ML (ref 0–9)
TSH SERPL-ACNC: 4.11 UIU/ML (ref 0.38–5.33)
VIT B12 SERPL-MCNC: 935 PG/ML (ref 211–911)
WBC # BLD AUTO: 6.6 K/UL (ref 4.8–10.8)

## 2025-04-03 PROCEDURE — 82607 VITAMIN B-12: CPT

## 2025-04-03 PROCEDURE — 84270 ASSAY OF SEX HORMONE GLOBUL: CPT

## 2025-04-03 PROCEDURE — 83001 ASSAY OF GONADOTROPIN (FSH): CPT

## 2025-04-03 PROCEDURE — 84481 FREE ASSAY (FT-3): CPT

## 2025-04-03 PROCEDURE — 86376 MICROSOMAL ANTIBODY EACH: CPT

## 2025-04-03 PROCEDURE — 84436 ASSAY OF TOTAL THYROXINE: CPT

## 2025-04-03 PROCEDURE — 80053 COMPREHEN METABOLIC PANEL: CPT

## 2025-04-03 PROCEDURE — 82670 ASSAY OF TOTAL ESTRADIOL: CPT

## 2025-04-03 PROCEDURE — 84443 ASSAY THYROID STIM HORMONE: CPT

## 2025-04-03 PROCEDURE — 36415 COLL VENOUS BLD VENIPUNCTURE: CPT

## 2025-04-03 PROCEDURE — 85025 COMPLETE CBC W/AUTO DIFF WBC: CPT

## 2025-04-03 PROCEDURE — 84403 ASSAY OF TOTAL TESTOSTERONE: CPT

## 2025-04-03 PROCEDURE — 82306 VITAMIN D 25 HYDROXY: CPT

## 2025-04-05 LAB — SHBG SERPL-SCNC: 125 NMOL/L (ref 17–125)

## 2025-04-07 LAB — TESTOST SERPL-MCNC: 10 NG/DL (ref 9–55)

## 2025-05-29 ENCOUNTER — HOSPITAL ENCOUNTER (OUTPATIENT)
Dept: LAB | Facility: MEDICAL CENTER | Age: 55
End: 2025-05-29
Attending: PHYSICIAN ASSISTANT
Payer: COMMERCIAL

## 2025-05-29 LAB
ESTRADIOL SERPL-MCNC: 104 PG/ML
FSH SERPL-ACNC: 9 MIU/ML

## 2025-05-29 PROCEDURE — 84403 ASSAY OF TOTAL TESTOSTERONE: CPT

## 2025-05-29 PROCEDURE — 36415 COLL VENOUS BLD VENIPUNCTURE: CPT

## 2025-05-29 PROCEDURE — 82670 ASSAY OF TOTAL ESTRADIOL: CPT

## 2025-05-29 PROCEDURE — 83001 ASSAY OF GONADOTROPIN (FSH): CPT

## 2025-06-03 LAB — TESTOST SERPL-MCNC: 119 NG/DL (ref 9–55)

## 2025-06-06 DIAGNOSIS — G43.909 MIGRAINE WITHOUT STATUS MIGRAINOSUS, NOT INTRACTABLE, UNSPECIFIED MIGRAINE TYPE: ICD-10-CM

## 2025-06-06 RX ORDER — SUMATRIPTAN SUCCINATE 100 MG/1
TABLET ORAL
Qty: 9 TABLET | Refills: 6 | Status: SHIPPED | OUTPATIENT
Start: 2025-06-06

## 2025-06-06 NOTE — TELEPHONE ENCOUNTER
Received request via: Pharmacy    Was the patient seen in the last year in this department? Yes    Does the patient have an active prescription (recently filled or refills available) for medication(s) requested? No    Pharmacy Name: [facility]     Does the patient have [facility] and need 100-day supply? (This applies to ALL medications) Patient does not have SCP

## 2025-07-23 ENCOUNTER — PATIENT MESSAGE (OUTPATIENT)
Dept: OBGYN | Facility: CLINIC | Age: 55
End: 2025-07-23
Payer: COMMERCIAL

## 2025-08-08 ENCOUNTER — APPOINTMENT (OUTPATIENT)
Dept: LAB | Facility: MEDICAL CENTER | Age: 55
End: 2025-08-08
Payer: COMMERCIAL

## 2025-08-09 ENCOUNTER — HOSPITAL ENCOUNTER (OUTPATIENT)
Dept: LAB | Facility: MEDICAL CENTER | Age: 55
End: 2025-08-09
Attending: PHYSICIAN ASSISTANT
Payer: COMMERCIAL

## 2025-08-09 ENCOUNTER — HOSPITAL ENCOUNTER (OUTPATIENT)
Facility: MEDICAL CENTER | Age: 55
End: 2025-08-09
Attending: PHYSICIAN ASSISTANT
Payer: COMMERCIAL

## 2025-08-09 LAB
ESTRADIOL SERPL-MCNC: 41.1 PG/ML
FSH SERPL-ACNC: 8.1 MIU/ML

## 2025-08-09 PROCEDURE — 84443 ASSAY THYROID STIM HORMONE: CPT

## 2025-08-09 PROCEDURE — 36415 COLL VENOUS BLD VENIPUNCTURE: CPT

## 2025-08-09 PROCEDURE — 84403 ASSAY OF TOTAL TESTOSTERONE: CPT

## 2025-08-09 PROCEDURE — 83001 ASSAY OF GONADOTROPIN (FSH): CPT

## 2025-08-09 PROCEDURE — 86376 MICROSOMAL ANTIBODY EACH: CPT

## 2025-08-09 PROCEDURE — 84144 ASSAY OF PROGESTERONE: CPT

## 2025-08-09 PROCEDURE — 84439 ASSAY OF FREE THYROXINE: CPT

## 2025-08-09 PROCEDURE — 82670 ASSAY OF TOTAL ESTRADIOL: CPT

## 2025-08-12 LAB
PROGEST SERPL-MCNC: 1.17 NG/ML
T4 FREE SERPL-MCNC: 1.29 NG/DL (ref 0.93–1.7)
THYROPEROXIDASE AB SERPL-ACNC: 9 IU/ML (ref 0–9)
TSH SERPL-ACNC: 7.09 UIU/ML (ref 0.38–5.33)

## 2025-08-16 LAB — TESTOST SERPL-MCNC: 97 NG/DL (ref 9–55)

## (undated) DEVICE — SUCTION INSTRUMENT YANKAUER BULBOUS TIP W/O VENT (50EA/CA)

## (undated) DEVICE — SPONGE GAUZESTER 4 X 4 4PLY - (128PK/CA)

## (undated) DEVICE — ELECTRODE DUAL RETURN W/ CORD - (50/PK)

## (undated) DEVICE — SET TUBING PNEUMOCLEAR HIGH FLOW SMOKE EVACUATION (10EA/BX)

## (undated) DEVICE — TUBING INSUFFLATION PNEUMOCLEAR HIGH-FLOW (10EA/BX)

## (undated) DEVICE — SET EXTENSION WITH 2 PORTS (48EA/CA) ***PART #2C8610 IS A SUBSTITUTE*****

## (undated) DEVICE — TROCAR Z THREAD 11 X 100 - BLADED (6/BX)

## (undated) DEVICE — CLIP LG INTNL HRZN TI ESCP LGT - (20/BX)

## (undated) DEVICE — TRAY CATHETER FOLEY URINE METER W/STATLOCK 350ML (10EA/CA)

## (undated) DEVICE — DRAPE MAYO STAND - (30/CA)

## (undated) DEVICE — SENSOR OXIMETER ADULT SPO2 RD SET (20EA/BX)

## (undated) DEVICE — TOWEL STOP TIMEOUT SAFETY FLAG (40EA/CA)

## (undated) DEVICE — SUTURE 3-0 VICRYL PLUS SH - 8X 18 INCH (12/BX)

## (undated) DEVICE — SUTURE 1 VICRYL PLUS CTX - 36 INCH (36/BX)

## (undated) DEVICE — NEEDLE INSFL 120MM 14GA VRRS - (20/BX)

## (undated) DEVICE — SUTURE 2-0 COATED VICRYL PLUS - 12 X 18 INCH (12/BX)

## (undated) DEVICE — CANISTER SUCTION 3000ML MECHANICAL FILTER AUTO SHUTOFF MEDI-VAC NONSTERILE LF DISP  (40EA/CA)

## (undated) DEVICE — TROCAR Z THREAD 12 X 100 - BLADED (6/BX)

## (undated) DEVICE — TROCARCANN&SEAL 5X55 ZTHREAD - 12/BX

## (undated) DEVICE — LACTATED RINGERS INJ 1000 ML - (14EA/CA 60CA/PF)

## (undated) DEVICE — GOWN SURGEONS X-LARGE - DISP. (30/CA)

## (undated) DEVICE — PLUMEPEN ULTRA 3/8 IN X 10 FT HOSE (20EA/CA)

## (undated) DEVICE — SLEEVE, VASO, THIGH, MED

## (undated) DEVICE — HEAD HOLDER JUNIOR/ADULT

## (undated) DEVICE — SUTURE 3-0 SILK SH C/R 18 IN - (12/BX)

## (undated) DEVICE — KIT ANESTHESIA W/CIRCUIT & 3/LT BAG W/FILTER (20EA/CA)

## (undated) DEVICE — PROTECTOR ULNA NERVE - (36PR/CA)

## (undated) DEVICE — MASK ANESTHESIA ADULT  - (100/CA)

## (undated) DEVICE — SUTURE GENERAL

## (undated) DEVICE — TUBE CONNECT SUCTION CLEAR 120 X 1/4" (50EA/CA)"

## (undated) DEVICE — CANNULA W/SEAL11X100ZTHREAD - (12/BX)

## (undated) DEVICE — SUTURE 0 COATED VICRYL 6-18IN - (12PK/BX)

## (undated) DEVICE — ENDOSTITCH LOAD UNIT 0 SURGI - 12/CA

## (undated) DEVICE — GLOVE BIOGEL SZ 7.5 SURGICAL PF LTX - (50PR/BX 4BX/CA)

## (undated) DEVICE — CLIP MED LG INTNL HRZN TI ESCP - (20/BX)

## (undated) DEVICE — ELECTRODE 850 FOAM ADHESIVE - HYDROGEL RADIOTRNSPRNT (50/PK)

## (undated) DEVICE — PACK MAJOR BASIN - (2EA/CA)

## (undated) DEVICE — DRAPE LAPAROTOMY T SHEET - (12EA/CA)

## (undated) DEVICE — GLOVE BIOGEL M SZ 8 SURGICAL PF LTX - (50/BX 4BX/CA)

## (undated) DEVICE — SODIUM CHL IRRIGATION 0.9% 1000ML (12EA/CA)

## (undated) DEVICE — TUBING CLEARLINK DUO-VENT - C-FLO (48EA/CA)

## (undated) DEVICE — SLEEVE VASO CALF MED - (10PR/CA)

## (undated) DEVICE — BANDAID SHEER STRIP 3/4 IN (100EA/BX 12BX/CA)

## (undated) DEVICE — GOWN WARMING STANDARD FLEX - (30/CA)

## (undated) DEVICE — TROCAR5X55 KII SHIELDED SYS - (6/BX)

## (undated) DEVICE — CHLORAPREP 26 ML APPLICATOR - ORANGE TINT(25/CA)

## (undated) DEVICE — STAPLER SKIN DISP - (6/BX 10BX/CA) VISISTAT

## (undated) DEVICE — GLOVE BIOGEL INDICATOR SZ 8 SURGICAL PF LTX - (50/BX 4BX/CA)

## (undated) DEVICE — SET SUCTION/IRRIGATION WITH DISPOSABLE TIP (6/CA )PART #0250-070-520 IS A SUB

## (undated) DEVICE — SET LEADWIRE 5 LEAD BEDSIDE DISPOSABLE ECG (1SET OF 5/EA)

## (undated) DEVICE — SUTURE 4-0 VICRYL PLUS FS-2 - 27 INCH (36/BX)

## (undated) DEVICE — CLIP MED INTNL HRZN TI ESCP - (25/BX)